# Patient Record
Sex: FEMALE | Race: WHITE | NOT HISPANIC OR LATINO | Employment: FULL TIME | RURAL
[De-identification: names, ages, dates, MRNs, and addresses within clinical notes are randomized per-mention and may not be internally consistent; named-entity substitution may affect disease eponyms.]

---

## 2017-01-27 ENCOUNTER — OFFICE VISIT (OUTPATIENT)
Dept: FAMILY MEDICINE CLINIC | Facility: CLINIC | Age: 37
End: 2017-01-27

## 2017-01-27 VITALS
HEART RATE: 87 BPM | WEIGHT: 130 LBS | DIASTOLIC BLOOD PRESSURE: 78 MMHG | SYSTOLIC BLOOD PRESSURE: 118 MMHG | OXYGEN SATURATION: 96 % | RESPIRATION RATE: 18 BRPM

## 2017-01-27 DIAGNOSIS — G43.109 MIGRAINE WITH AURA AND WITHOUT STATUS MIGRAINOSUS, NOT INTRACTABLE: Primary | ICD-10-CM

## 2017-01-27 PROCEDURE — 99213 OFFICE O/P EST LOW 20 MIN: CPT | Performed by: FAMILY MEDICINE

## 2017-01-27 RX ORDER — TOPIRAMATE 50 MG/1
50 TABLET, FILM COATED ORAL DAILY
Qty: 90 TABLET | Refills: 1 | Status: SHIPPED | OUTPATIENT
Start: 2017-01-27 | End: 2017-10-19 | Stop reason: SDUPTHER

## 2017-01-27 NOTE — MR AVS SNAPSHOT
Gloria Vásquez   1/27/2017 3:30 PM   Office Visit    Dept Phone:  [phone]   Encounter #:  [id number]    Provider:  Ricky Pittman MD   Department:  [facility]                Your Full Care Plan              Today's Medication Changes          These changes are accurate as of: 1/27/17  3:38 PM.  If you have any questions, ask your nurse or doctor.               Medication(s)that have changed:     topiramate 50 MG tablet   Commonly known as:  TOPAMAX   Take 1 tablet by mouth Daily.   What changed:    - medication strength  - how much to take  - when to take this  - additional instructions   Changed by:  Ricky Pittman MD            Where to Get Your Medications      These medications were sent to [facility] / [place], [place] - [address] [facility] - [phone]  - [phone]   [address] [facility] [address]     Phone:  [phone]     topiramate 50 MG tablet                  Your Updated Medication List          This list is accurate as of: 1/27/17  3:38 PM.  Always use your most recent med list.                melatonin 1 MG tablet       topiramate 50 MG tablet   Commonly known as:  TOPAMAX   Take 1 tablet by mouth Daily.               You Were Diagnosed With        Codes Comments    Migraine with aura and without status migrainosus, not intractable    -  Primary ICD-10-CM: G43.109  ICD-9-CM: 346.00       Instructions     None    Patient Instructions History      Upcoming Appointments     Visit Type Date Time Department    FOLLOW UP 1/27/2017  3:30 PM [facility]    OFFICE VISIT 4/12/2017  3:45 PM [facility]      [employer] Signup     [facility] allows you to send messages to your doctor, view your test results, renew your prescriptions, schedule appointments, and more. To sign up, go to [employer] and click on the Sign Up Now link in the New User? box. Enter your [facility]  Activation Code exactly as it appears below along with the last four digits of your Social Security Number and your Date of Birth () to complete the sign-up process. If you do not sign up before the expiration date, you must request a new code.    [employer] Activation Code: [email or web address]  Expires: 2/10/2017  3:38 PM    If you have questions, you can email [email or web address] or call [phone] to talk to our [employer] staff. Remember, [employer] is NOT to be used for urgent needs. For medical emergencies, dial 911.               Other Info from Your Visit           Your Appointments     2017  3:45 PM CDT   Office Visit with Ricky Pittman MD   [facility] (--)    [address] [address] [phone]           Arrive 15 minutes prior to appointment.              Allergies     Sulfa Antibiotics        Reason for Visit     Follow-up           Vital Signs     Blood Pressure Pulse Respirations Weight Oxygen Saturation Smoking Status    118/78 87 18 130 lb (59 kg) 96% Current Every Day Smoker      Problems and Diagnoses Noted     Migraines

## 2017-01-27 NOTE — PROGRESS NOTES
Mao Vásquez is a 36 y.o. female.     Chief Complaint   Patient presents with   • Follow-up       History of Present Illness     she is feeling much better wtih the topamax--migraine ha are much better --she is pleased...      Current Outpatient Prescriptions:   •  melatonin 1 MG tablet, Take 2 mg by mouth At Night As Needed for sleep., Disp: , Rfl:   •  topiramate (TOPAMAX) 50 MG tablet, Take 1 tablet by mouth Daily., Disp: 90 tablet, Rfl: 1  Allergies   Allergen Reactions   • Sulfa Antibiotics        Past Medical History   Diagnosis Date   • Headache      Past Surgical History   Procedure Laterality Date   • Carpal tunnel release Bilateral    •  section     • Colonoscopy         Review of Systems   Constitutional: Negative.    Eyes: Negative.    Respiratory: Negative.    Cardiovascular: Negative.    Gastrointestinal: Negative.    Endocrine: Negative.    Genitourinary: Negative.    Musculoskeletal: Negative.    Skin: Negative.    Allergic/Immunologic: Negative.    Neurological: Positive for headaches.   Hematological: Negative.    Psychiatric/Behavioral: Negative.        Objective    Visit Vitals   • /78   • Pulse 87   • Resp 18   • Wt 130 lb (59 kg)   • SpO2 96%     Physical Exam   Constitutional: She is oriented to person, place, and time. She appears well-developed and well-nourished.   HENT:   Head: Normocephalic and atraumatic.   Right Ear: External ear normal.   Left Ear: External ear normal.   Nose: Nose normal.   Mouth/Throat: Oropharynx is clear and moist.   Eyes: Conjunctivae and EOM are normal. Pupils are equal, round, and reactive to light.   Neck: Normal range of motion. Neck supple.   Cardiovascular: Normal rate, regular rhythm, normal heart sounds and intact distal pulses.    Pulmonary/Chest: Effort normal and breath sounds normal.   Abdominal: Soft. Bowel sounds are normal.   Musculoskeletal: Normal range of motion.   Neurological: She is alert and oriented to person,  place, and time. She has normal reflexes.   Skin: Skin is warm and dry.   Psychiatric: She has a normal mood and affect. Her behavior is normal. Judgment and thought content normal.   Nursing note and vitals reviewed.      Assessment/Plan   Gloria was seen today for follow-up.    Diagnoses and all orders for this visit:    Migraine with aura and without status migrainosus, not intractable    Other orders  -     topiramate (TOPAMAX) 50 MG tablet; Take 1 tablet by mouth Daily.                 No orders of the defined types were placed in this encounter.      Follow up: 6 month(s)

## 2017-02-20 ENCOUNTER — TELEPHONE (OUTPATIENT)
Dept: FAMILY MEDICINE CLINIC | Facility: CLINIC | Age: 37
End: 2017-02-20

## 2017-02-20 NOTE — TELEPHONE ENCOUNTER
Says she is having anxiety or panic attacks. Was wanting to get something for her or an appt to disuss.

## 2017-02-24 ENCOUNTER — OFFICE VISIT (OUTPATIENT)
Dept: FAMILY MEDICINE CLINIC | Facility: CLINIC | Age: 37
End: 2017-02-24

## 2017-02-24 VITALS
DIASTOLIC BLOOD PRESSURE: 80 MMHG | RESPIRATION RATE: 18 BRPM | SYSTOLIC BLOOD PRESSURE: 134 MMHG | OXYGEN SATURATION: 97 % | WEIGHT: 129 LBS | HEART RATE: 103 BPM

## 2017-02-24 DIAGNOSIS — F41.9 ANXIETY: Primary | ICD-10-CM

## 2017-02-24 PROCEDURE — 99213 OFFICE O/P EST LOW 20 MIN: CPT | Performed by: FAMILY MEDICINE

## 2017-02-24 RX ORDER — LORAZEPAM 1 MG/1
1 TABLET ORAL DAILY PRN
Qty: 20 TABLET | Refills: 0 | Status: SHIPPED | OUTPATIENT
Start: 2017-02-24 | End: 2018-01-26 | Stop reason: SDUPTHER

## 2017-02-24 NOTE — PROGRESS NOTES
Mao Vásquez is a 36 y.o. female.     Chief Complaint   Patient presents with   • Anxiety        History of Present Illness     she nots feeling anxious...several things going on in her life..here today wth her mother..denies being suicidal      Current Outpatient Prescriptions:   •  topiramate (TOPAMAX) 50 MG tablet, Take 1 tablet by mouth Daily., Disp: 90 tablet, Rfl: 1  •  LORazepam (ATIVAN) 1 MG tablet, Take 1 tablet by mouth Daily As Needed for anxiety., Disp: 20 tablet, Rfl: 0  •  sertraline (ZOLOFT) 50 MG tablet, Take 1 tablet by mouth Daily., Disp: 30 tablet, Rfl: 1  Allergies   Allergen Reactions   • Sulfa Antibiotics        Past Medical History   Diagnosis Date   • Headache      Past Surgical History   Procedure Laterality Date   • Carpal tunnel release Bilateral    •  section     • Colonoscopy         Review of Systems   Constitutional: Negative.    HENT: Negative.    Eyes: Negative.    Cardiovascular: Negative.    Gastrointestinal: Negative.    Endocrine: Negative.    Genitourinary: Negative.    Musculoskeletal: Negative.    Skin: Negative.    Allergic/Immunologic: Negative.    Neurological: Negative.    Hematological: Negative.    Psychiatric/Behavioral: Positive for sleep disturbance. The patient is nervous/anxious.        Objective    Visit Vitals   • /80   • Pulse 103   • Resp 18   • Wt 129 lb (58.5 kg)   • SpO2 97%     Physical Exam   Constitutional: She is oriented to person, place, and time. She appears well-developed and well-nourished.   HENT:   Head: Normocephalic and atraumatic.   Right Ear: External ear normal.   Left Ear: External ear normal.   Nose: Nose normal.   Mouth/Throat: Oropharynx is clear and moist.   Eyes: Conjunctivae and EOM are normal. Pupils are equal, round, and reactive to light.   Neck: Normal range of motion. Neck supple.   Cardiovascular: Normal rate, regular rhythm, normal heart sounds and intact distal pulses.    Pulmonary/Chest: Effort  normal and breath sounds normal.   Abdominal: Bowel sounds are normal.   Musculoskeletal: Normal range of motion.   Neurological: She is alert and oriented to person, place, and time. She has normal reflexes.   Skin: Skin is warm and dry.   Psychiatric: She has a normal mood and affect. Her behavior is normal. Judgment and thought content normal.   Nursing note and vitals reviewed.      Assessment/Plan   Gloria was seen today for anxiety.    Diagnoses and all orders for this visit:    Anxiety    Other orders  -     sertraline (ZOLOFT) 50 MG tablet; Take 1 tablet by mouth Daily.  -     LORazepam (ATIVAN) 1 MG tablet; Take 1 tablet by mouth Daily As Needed for anxiety.                 No orders of the defined types were placed in this encounter.      Follow up: 4 week(s)

## 2017-03-22 ENCOUNTER — OFFICE VISIT (OUTPATIENT)
Dept: FAMILY MEDICINE CLINIC | Facility: CLINIC | Age: 37
End: 2017-03-22

## 2017-03-22 VITALS
OXYGEN SATURATION: 97 % | DIASTOLIC BLOOD PRESSURE: 78 MMHG | RESPIRATION RATE: 16 BRPM | BODY MASS INDEX: 23 KG/M2 | WEIGHT: 125 LBS | HEART RATE: 108 BPM | SYSTOLIC BLOOD PRESSURE: 126 MMHG | HEIGHT: 62 IN

## 2017-03-22 DIAGNOSIS — F41.9 ANXIETY: Primary | ICD-10-CM

## 2017-03-22 DIAGNOSIS — G43.109 MIGRAINE WITH AURA AND WITHOUT STATUS MIGRAINOSUS, NOT INTRACTABLE: ICD-10-CM

## 2017-03-22 PROCEDURE — 99212 OFFICE O/P EST SF 10 MIN: CPT | Performed by: FAMILY MEDICINE

## 2017-03-22 NOTE — PROGRESS NOTES
"Mao Vásquez is a 36 y.o. female.     Chief Complaint   Patient presents with   • Follow-up        History of Present Illness     she notes her migraine ha is stable--her anxiety is stable --she took ativan once --it contdrolled her symptoms..denies any issues with side effects from the zoloft      Current Outpatient Prescriptions:   •  LORazepam (ATIVAN) 1 MG tablet, Take 1 tablet by mouth Daily As Needed for anxiety., Disp: 20 tablet, Rfl: 0  •  sertraline (ZOLOFT) 50 MG tablet, Take 1 tablet by mouth Daily., Disp: 30 tablet, Rfl: 1  •  topiramate (TOPAMAX) 50 MG tablet, Take 1 tablet by mouth Daily., Disp: 90 tablet, Rfl: 1  Allergies   Allergen Reactions   • Sulfa Antibiotics        Past Medical History:   Diagnosis Date   • Headache      Past Surgical History:   Procedure Laterality Date   • CARPAL TUNNEL RELEASE Bilateral    •  SECTION     • COLONOSCOPY         Review of Systems   Constitutional: Negative.    HENT: Negative.    Eyes: Negative.    Respiratory: Negative.    Cardiovascular: Negative.    Gastrointestinal: Negative.    Endocrine: Negative.    Genitourinary: Negative.    Musculoskeletal: Negative.    Skin: Negative.    Allergic/Immunologic: Negative.    Neurological: Negative.    Hematological: Negative.    Psychiatric/Behavioral: The patient is nervous/anxious.        Objective  /78  Pulse 108  Resp 16  Ht 62\" (157.5 cm)  Wt 125 lb (56.7 kg)  SpO2 97%  BMI 22.86 kg/m2  Physical Exam   Constitutional: She is oriented to person, place, and time. She appears well-developed and well-nourished.   HENT:   Head: Normocephalic and atraumatic.   Right Ear: External ear normal.   Left Ear: External ear normal.   Nose: Nose normal.   Mouth/Throat: Oropharynx is clear and moist.   Eyes: Conjunctivae and EOM are normal. Pupils are equal, round, and reactive to light.   Neck: Normal range of motion. Neck supple.   Cardiovascular: Normal rate, regular rhythm, normal heart sounds " and intact distal pulses.    Pulmonary/Chest: Effort normal and breath sounds normal.   Abdominal: Soft. Bowel sounds are normal.   Musculoskeletal: Normal range of motion.   Neurological: She is alert and oriented to person, place, and time. She has normal reflexes.   Skin: Skin is warm and dry.   Psychiatric: She has a normal mood and affect. Her behavior is normal. Judgment and thought content normal.   Nursing note and vitals reviewed.      Assessment/Plan   Gloria was seen today for follow-up.    Diagnoses and all orders for this visit:    Anxiety    Migraine with aura and without status migrainosus, not intractable                 No orders of the defined types were placed in this encounter.      Follow up: 6 month(s)

## 2017-07-26 ENCOUNTER — OFFICE VISIT (OUTPATIENT)
Dept: FAMILY MEDICINE CLINIC | Facility: CLINIC | Age: 37
End: 2017-07-26

## 2017-07-26 VITALS
DIASTOLIC BLOOD PRESSURE: 70 MMHG | HEIGHT: 62 IN | WEIGHT: 130 LBS | HEART RATE: 94 BPM | BODY MASS INDEX: 23.92 KG/M2 | SYSTOLIC BLOOD PRESSURE: 110 MMHG | OXYGEN SATURATION: 98 % | RESPIRATION RATE: 16 BRPM

## 2017-07-26 DIAGNOSIS — Z72.0 TOBACCO ABUSE: ICD-10-CM

## 2017-07-26 DIAGNOSIS — R05.9 COUGH: ICD-10-CM

## 2017-07-26 DIAGNOSIS — F41.9 ANXIETY: Primary | ICD-10-CM

## 2017-07-26 DIAGNOSIS — G43.109 MIGRAINE WITH AURA AND WITHOUT STATUS MIGRAINOSUS, NOT INTRACTABLE: ICD-10-CM

## 2017-07-26 PROCEDURE — 99214 OFFICE O/P EST MOD 30 MIN: CPT | Performed by: FAMILY MEDICINE

## 2017-07-26 NOTE — PROGRESS NOTES
"Mao Vásquez is a 36 y.o. female.     Chief Complaint   Patient presents with   • Follow-up     6 mo        History of Present Illness     she notes having cough for about 4 weeks--its prod but she has not seen the mucous--denies any fever or hemoptysis  Migraine hx is stable --anxiety is about the same with meds--she is still smoking    Current Outpatient Prescriptions:   •  LORazepam (ATIVAN) 1 MG tablet, Take 1 tablet by mouth Daily As Needed for anxiety., Disp: 20 tablet, Rfl: 0  •  sertraline (ZOLOFT) 50 MG tablet, Take 1 tablet by mouth Daily., Disp: 30 tablet, Rfl: 3  •  topiramate (TOPAMAX) 50 MG tablet, Take 1 tablet by mouth Daily., Disp: 90 tablet, Rfl: 1  Allergies   Allergen Reactions   • Sulfa Antibiotics        Past Medical History:   Diagnosis Date   • Headache      Past Surgical History:   Procedure Laterality Date   • CARPAL TUNNEL RELEASE Bilateral    •  SECTION     • COLONOSCOPY         Review of Systems   Constitutional: Negative.    HENT: Negative.    Eyes: Negative.    Respiratory: Positive for cough.    Cardiovascular: Negative.    Gastrointestinal: Negative.    Endocrine: Negative.    Genitourinary: Negative.    Musculoskeletal: Negative.    Skin: Negative.    Allergic/Immunologic: Negative.    Neurological: Negative.    Hematological: Negative.    Psychiatric/Behavioral: Negative.        Objective  /70  Pulse 94  Resp 16  Ht 62\" (157.5 cm)  Wt 130 lb (59 kg)  SpO2 98%  BMI 23.78 kg/m2  Physical Exam   Constitutional: She is oriented to person, place, and time. She appears well-developed and well-nourished.   HENT:   Head: Normocephalic and atraumatic.   Right Ear: External ear normal.   Left Ear: External ear normal.   Nose: Nose normal.   Mouth/Throat: Oropharynx is clear and moist.   Eyes: Conjunctivae and EOM are normal. Pupils are equal, round, and reactive to light.   Neck: Normal range of motion. Neck supple.   Cardiovascular: Normal rate, regular " rhythm, normal heart sounds and intact distal pulses.    Pulmonary/Chest: Effort normal and breath sounds normal.   Abdominal: Soft. Bowel sounds are normal.   Musculoskeletal: Normal range of motion.   Neurological: She is alert and oriented to person, place, and time. She has normal reflexes.   Skin: Skin is warm and dry.   Psychiatric: She has a normal mood and affect. Her behavior is normal. Judgment and thought content normal.   Nursing note and vitals reviewed.      Assessment/Plan   Gloria was seen today for follow-up.    Diagnoses and all orders for this visit:    Anxiety    Migraine with aura and without status migrainosus, not intractable    Cough  -     XR Chest 2 View    Tobacco abuse    we spent 5min discussing smoking cessation             Orders Placed This Encounter   Procedures   • XR Chest 2 View     Order Specific Question:   Reason for Exam:     Answer:   cough       Follow up: 6 month(s)

## 2017-08-01 ENCOUNTER — TELEPHONE (OUTPATIENT)
Dept: FAMILY MEDICINE CLINIC | Facility: CLINIC | Age: 37
End: 2017-08-01

## 2017-10-19 RX ORDER — TOPIRAMATE 50 MG/1
50 TABLET, FILM COATED ORAL DAILY
Qty: 90 TABLET | Refills: 1 | Status: SHIPPED | OUTPATIENT
Start: 2017-10-19 | End: 2020-01-13

## 2018-01-26 ENCOUNTER — OFFICE VISIT (OUTPATIENT)
Dept: FAMILY MEDICINE CLINIC | Facility: CLINIC | Age: 38
End: 2018-01-26

## 2018-01-26 VITALS
BODY MASS INDEX: 28.52 KG/M2 | OXYGEN SATURATION: 98 % | HEIGHT: 62 IN | DIASTOLIC BLOOD PRESSURE: 88 MMHG | HEART RATE: 78 BPM | RESPIRATION RATE: 16 BRPM | WEIGHT: 155 LBS | SYSTOLIC BLOOD PRESSURE: 122 MMHG

## 2018-01-26 DIAGNOSIS — F41.9 ANXIETY: ICD-10-CM

## 2018-01-26 DIAGNOSIS — G43.109 MIGRAINE WITH AURA AND WITHOUT STATUS MIGRAINOSUS, NOT INTRACTABLE: Primary | ICD-10-CM

## 2018-01-26 DIAGNOSIS — Z72.0 TOBACCO ABUSE: ICD-10-CM

## 2018-01-26 PROBLEM — R05.9 COUGH: Status: RESOLVED | Noted: 2017-07-26 | Resolved: 2018-01-26

## 2018-01-26 PROCEDURE — 99213 OFFICE O/P EST LOW 20 MIN: CPT | Performed by: FAMILY MEDICINE

## 2018-01-26 RX ORDER — MEDROXYPROGESTERONE ACETATE 150 MG/ML
INJECTION, SUSPENSION INTRAMUSCULAR
COMMUNITY
Start: 2018-01-15 | End: 2019-12-31

## 2018-01-26 RX ORDER — LORAZEPAM 1 MG/1
1 TABLET ORAL DAILY PRN
Qty: 30 TABLET | Refills: 0 | OUTPATIENT
Start: 2018-01-26 | End: 2019-01-22 | Stop reason: SDUPTHER

## 2018-01-26 NOTE — PROGRESS NOTES
"Mao Vásquez is a 37 y.o. female.     Chief Complaint   Patient presents with   • Follow-up     6 mo       History of Present Illness     here today--she is still smoking but ready to quit--her migraine headache hx is stable...her anxiety is about the same--worrying about the health of her daughter      Current Outpatient Prescriptions:   •  LORazepam (ATIVAN) 1 MG tablet, Take 1 tablet by mouth Daily As Needed for Anxiety., Disp: 30 tablet, Rfl: 0  •  MedroxyPROGESTERone Acetate 150 MG/ML suspension prefilled syringe, , Disp: , Rfl:   •  topiramate (TOPAMAX) 50 MG tablet, Take 1 tablet by mouth Daily., Disp: 90 tablet, Rfl: 1  Allergies   Allergen Reactions   • Sulfa Antibiotics        Past Medical History:   Diagnosis Date   • Headache      Past Surgical History:   Procedure Laterality Date   • CARPAL TUNNEL RELEASE Bilateral    •  SECTION     • COLONOSCOPY         Review of Systems   Constitutional: Negative.    Eyes: Negative.    Respiratory: Negative.    Cardiovascular: Negative.    Gastrointestinal: Negative.    Endocrine: Negative.    Genitourinary: Negative.    Musculoskeletal: Negative.    Skin: Negative.    Allergic/Immunologic: Negative.    Neurological: Positive for headaches.   Hematological: Negative.    Psychiatric/Behavioral: Negative.        Objective  /88  Pulse 78  Resp 16  Ht 157.5 cm (62\")  Wt 70.3 kg (155 lb)  SpO2 98%  BMI 28.35 kg/m2  Physical Exam   Constitutional: She is oriented to person, place, and time. She appears well-developed and well-nourished.   HENT:   Head: Normocephalic and atraumatic.   Right Ear: External ear normal.   Left Ear: External ear normal.   Nose: Nose normal.   Mouth/Throat: Oropharynx is clear and moist.   Eyes: Conjunctivae and EOM are normal. Pupils are equal, round, and reactive to light.   Neck: Normal range of motion. Neck supple.   Cardiovascular: Normal rate, regular rhythm, normal heart sounds and intact distal pulses.  "   Pulmonary/Chest: Effort normal and breath sounds normal.   Abdominal: Soft. Bowel sounds are normal.   Musculoskeletal: Normal range of motion.   Neurological: She is alert and oriented to person, place, and time. She has normal reflexes.   Skin: Skin is warm and dry.   Psychiatric: She has a normal mood and affect. Her behavior is normal. Judgment and thought content normal.   Nursing note and vitals reviewed.      Assessment/Plan   Gloria was seen today for follow-up.    Diagnoses and all orders for this visit:    Migraine with aura and without status migrainosus, not intractable  -     CBC w AUTO Differential  -     Comprehensive Metabolic Panel  -     Lipid panel  -     TSH    Tobacco abuse    Anxiety  -     Hemoglobin A1c  -     Urinalysis - Urine, Clean Catch    Patient's BMI is above normal parameters. Follow-up plan includes:  exercise counseling and nutrition counseling.  I advised the patient of the risks in continuing to use tobacco, and I provided this patient with smoking cessation educational materials.  I also discussed how to quit smoking and the patient has expressed the willingness to quit.      During this visit, I spent 3-10 mintues counseling the patient regarding smoking cessation.              Orders Placed This Encounter   Procedures   • Comprehensive Metabolic Panel   • Lipid panel   • TSH   • Hemoglobin A1c   • Urinalysis - Urine, Clean Catch   • CBC w AUTO Differential     Order Specific Question:   Manual Differential     Answer:   No       Follow up: 6 month(s)

## 2018-01-26 NOTE — PATIENT INSTRUCTIONS
Tobacco Use Disorder  Tobacco use disorder (TUD) is a mental disorder. It is the long-term use of tobacco in spite of related health problems or difficulty with normal life activities. Tobacco is most commonly smoked as cigarettes and less commonly as cigars or pipes. Smokeless chewing tobacco and snuff are also popular. People with TUD get a feeling of extreme pleasure (euphoria) from using tobacco and have a desire to use it again and again. Repeated use of tobacco can cause problems.  The addictive effects of tobacco are due mainly to the ingredient nicotine. Nicotine also causes a rush of adrenaline (epinephrine) in the body. This leads to increased blood pressure, heart rate, and breathing rate. These changes may cause problems for people with high blood pressure, weak hearts, or lung disease. High doses of nicotine in children and pets can lead to seizures and death.  Tobacco contains a number of other unsafe chemicals. These chemicals are especially harmful when inhaled as smoke and can damage almost every organ in the body. Smokers live shorter lives than nonsmokers and are at risk of dying from a number of diseases and cancers. Tobacco smoke can also cause health problems for nonsmokers (due to inhaling secondhand smoke). Smoking is also a fire hazard.  TUD usually starts in the late teenage years and is most common in young adults between the ages of 18 and 25 years. People who start smoking earlier in life are more likely to continue smoking as adults. TUD is somewhat more common in men than women. People with TUD are at higher risk for using alcohol and other drugs of abuse.  What increases the risk?  Risk factors for TUD include:  · Having family members with the disorder.  · Being around people who use tobacco.  · Having an existing mental health issue such as schizophrenia, depression, bipolar disorder, ADHD, or posttraumatic stress disorder (PTSD).  What are the signs or symptoms?  People with  tobacco use disorder have two or more of the following signs and symptoms within 12 months:  · Use of more tobacco over a longer period than intended.  · Not able to cut down or control tobacco use.  · A lot of time spent obtaining or using tobacco.  · Strong desire or urge to use tobacco (craving). Cravings may last for 6 months or longer after quitting.  · Use of tobacco even when use leads to major problems at work, school, or home.  · Use of tobacco even when use leads to relationship problems.  · Giving up or cutting down on important life activities because of tobacco use.  · Repeatedly using tobacco in situations where it puts you or others in physical danger, like smoking in bed.  · Use of tobacco even when it is known that a physical or mental problem is likely related to tobacco use.  ¨ Physical problems are numerous and may include chronic bronchitis, emphysema, lung and other cancers, gum disease, high blood pressure, heart disease, and stroke.  ¨ Mental problems caused by tobacco may include difficulty sleeping and anxiety.  · Need to use greater amounts of tobacco to get the same effect. This means you have developed a tolerance.  · Withdrawal symptoms as a result of stopping or rapidly cutting back use. These symptoms may last a month or more after quitting and include the following:  ¨ Depressed, anxious, or irritable mood.  ¨ Difficulty concentrating.  ¨ Increased appetite.  ¨ Restlessness or trouble sleeping.  ¨ Use of tobacco to avoid withdrawal symptoms.  How is this diagnosed?  Tobacco use disorder is diagnosed by your health care provider. A diagnosis may be made by:  · Your health care provider asking questions about your tobacco use and any problems it may be causing.  · A physical exam.  · Lab tests.  · You may be referred to a mental health professional or addiction specialist.  The severity of tobacco use disorder depends on the number of signs and symptoms you have:  · Mild--Two or three  symptoms.  · Moderate--Four or five symptoms.  · Severe--Six or more symptoms.  How is this treated?  Many people with tobacco use disorder are unable to quit on their own and need help. Treatment options include the following:  · Nicotine replacement therapy (NRT). NRT provides nicotine without the other harmful chemicals in tobacco. NRT gradually lowers the dosage of nicotine in the body and reduces withdrawal symptoms. NRT is available in over-the-counter forms (gum, lozenges, and skin patches) as well as prescription forms (mouth inhaler and nasal spray).  · Medicines. This may include:  ¨ Antidepressant medicine that may reduce nicotine cravings.  ¨ A medicine that acts on nicotine receptors in the brain to reduce cravings and withdrawal symptoms. It may also block the effects of tobacco in people with TUD who relapse.  · Counseling or talk therapy. A form of talk therapy called behavioral therapy is commonly used to treat people with TUD. Behavioral therapy looks at triggers for tobacco use, how to avoid them, and how to cope with cravings. It is most effective in person or by phone but is also available in self-help forms (books and Internet websites).  · Support groups. These provide emotional support, advice, and guidance for quitting tobacco.  The most effective treatment for TUD is usually a combination of medicine, talk therapy, and support groups.  Follow these instructions at home:  · Keep all follow-up visits as directed by your health care provider. This is important.  · Take medicines only as directed by your health care provider.  · Check with your health care provider before starting new prescription or over-the-counter medicines.  Contact a health care provider if:  · You are not able to take your medicines as prescribed.  · Treatment is not helping your TUD and your symptoms get worse.  Get help right away if:  · You have serious thoughts about hurting yourself or others.  · You have trouble  breathing, chest pain, sudden weakness, or sudden numbness in part of your body.  This information is not intended to replace advice given to you by your health care provider. Make sure you discuss any questions you have with your health care provider.  Document Released: 08/23/2005 Document Revised: 08/20/2017 Document Reviewed: 02/13/2015  [employer] Interactive Patient Education © 2017 [employer]

## 2019-01-22 RX ORDER — LORAZEPAM 1 MG/1
1 TABLET ORAL DAILY PRN
Qty: 30 TABLET | Refills: 0 | Status: SHIPPED | OUTPATIENT
Start: 2019-01-22 | End: 2019-07-31 | Stop reason: SDUPTHER

## 2019-01-30 ENCOUNTER — TELEPHONE (OUTPATIENT)
Dept: FAMILY MEDICINE CLINIC | Facility: CLINIC | Age: 39
End: 2019-01-30

## 2019-07-31 RX ORDER — LORAZEPAM 1 MG/1
1 TABLET ORAL DAILY PRN
Qty: 10 TABLET | Refills: 0 | Status: SHIPPED | OUTPATIENT
Start: 2019-07-31 | End: 2019-12-18 | Stop reason: SDUPTHER

## 2019-07-31 NOTE — TELEPHONE ENCOUNTER
Pt has not been seen in over a yr but this med was rf on 1/19 so I gave 10 tabs with note for an appt

## 2019-10-25 ENCOUNTER — TELEPHONE (OUTPATIENT)
Dept: FAMILY MEDICINE CLINIC | Facility: CLINIC | Age: 39
End: 2019-10-25

## 2019-10-25 RX ORDER — AZITHROMYCIN 250 MG/1
TABLET, FILM COATED ORAL
Qty: 6 TABLET | Refills: 0 | Status: SHIPPED | OUTPATIENT
Start: 2019-10-25 | End: 2019-12-31

## 2019-10-25 NOTE — TELEPHONE ENCOUNTER
Last OV 1-26-18, 2 no show appts since.    Pt calls requesting an abx be sent in to [facility] for congestion and cough x 1 week.

## 2019-12-18 DIAGNOSIS — F41.9 ANXIETY: Primary | ICD-10-CM

## 2019-12-18 RX ORDER — LORAZEPAM 1 MG/1
1 TABLET ORAL DAILY PRN
Qty: 10 TABLET | Refills: 0 | Status: SHIPPED | OUTPATIENT
Start: 2019-12-18 | End: 2020-01-02 | Stop reason: SDUPTHER

## 2019-12-31 ENCOUNTER — OFFICE VISIT (OUTPATIENT)
Dept: FAMILY MEDICINE CLINIC | Facility: CLINIC | Age: 39
End: 2019-12-31

## 2019-12-31 VITALS
SYSTOLIC BLOOD PRESSURE: 136 MMHG | OXYGEN SATURATION: 97 % | HEART RATE: 95 BPM | BODY MASS INDEX: 28.71 KG/M2 | RESPIRATION RATE: 16 BRPM | HEIGHT: 62 IN | DIASTOLIC BLOOD PRESSURE: 82 MMHG | WEIGHT: 156 LBS | TEMPERATURE: 98 F

## 2019-12-31 DIAGNOSIS — R23.2 HOT FLASHES: Primary | ICD-10-CM

## 2019-12-31 PROCEDURE — 99213 OFFICE O/P EST LOW 20 MIN: CPT | Performed by: FAMILY MEDICINE

## 2019-12-31 NOTE — PROGRESS NOTES
"Mao Vásquez is a 39 y.o. female.     Chief Complaint   Patient presents with   • Med Refill     and would also like to get her hormones checked.   • Night Sweats     also day time hot flashes.        History of Present Illness     she is noting hot flashes and wonders if she having menopause      Current Outpatient Medications:   •  LORazepam (ATIVAN) 1 MG tablet, Take 1 tablet by mouth Daily As Needed for Anxiety., Disp: 10 tablet, Rfl: 0  •  topiramate (TOPAMAX) 50 MG tablet, Take 1 tablet by mouth Daily., Disp: 90 tablet, Rfl: 1  Allergies   Allergen Reactions   • Sulfa Antibiotics        Past Medical History:   Diagnosis Date   • Headache      Past Surgical History:   Procedure Laterality Date   • CARPAL TUNNEL RELEASE Bilateral    •  SECTION     • COLONOSCOPY         Review of Systems   Constitutional: Positive for fatigue.   HENT: Negative.    Eyes: Negative.    Respiratory: Negative.    Cardiovascular: Negative.    Gastrointestinal: Negative.    Endocrine: Negative.    Genitourinary: Negative.    Musculoskeletal: Negative.    Skin: Negative.    Allergic/Immunologic: Negative.    Neurological: Negative.    Hematological: Negative.    Psychiatric/Behavioral: Negative.        Objective  /82 (BP Location: Left arm, Patient Position: Sitting)   Pulse 95   Temp 98 °F (36.7 °C)   Resp 16   Ht 157.5 cm (62\")   Wt 70.8 kg (156 lb)   SpO2 97%   BMI 28.53 kg/m²   Physical Exam   Constitutional: She is oriented to person, place, and time. She appears well-developed and well-nourished.   HENT:   Head: Normocephalic and atraumatic.   Right Ear: External ear normal.   Left Ear: External ear normal.   Nose: Nose normal.   Mouth/Throat: Oropharynx is clear and moist.   Eyes: Pupils are equal, round, and reactive to light. Conjunctivae and EOM are normal.   Neck: Normal range of motion. Neck supple.   Cardiovascular: Normal rate, regular rhythm, normal heart sounds and intact distal pulses. "   Pulmonary/Chest: Effort normal and breath sounds normal.   Abdominal: Soft. Bowel sounds are normal.   Musculoskeletal: Normal range of motion.   Neurological: She is alert and oriented to person, place, and time.   Skin: Skin is warm. Capillary refill takes less than 2 seconds.   Psychiatric: She has a normal mood and affect. Her behavior is normal. Judgment and thought content normal.   Nursing note and vitals reviewed.      Assessment/Plan   Gloria was seen today for med refill and night sweats.    Diagnoses and all orders for this visit:    Hot flashes  -     FSH & LH  -     Estrogens, Total                 Orders Placed This Encounter   Procedures   • FSH & LH   • Estrogens, Total       Follow up: 6 month(s)

## 2020-01-02 DIAGNOSIS — F41.9 ANXIETY: ICD-10-CM

## 2020-01-02 RX ORDER — LORAZEPAM 1 MG/1
1 TABLET ORAL DAILY PRN
Qty: 30 TABLET | Refills: 0 | Status: SHIPPED | OUTPATIENT
Start: 2020-01-02

## 2020-01-03 LAB
ESTROGEN SERPL-MCNC: 170 PG/ML
FSH SERPL-ACNC: 5.4 MIU/ML
LH SERPL-ACNC: 7 MIU/ML

## 2020-01-08 ENCOUNTER — RESULTS ENCOUNTER (OUTPATIENT)
Dept: FAMILY MEDICINE CLINIC | Facility: CLINIC | Age: 40
End: 2020-01-08

## 2020-01-08 DIAGNOSIS — R53.83 FATIGUE, UNSPECIFIED TYPE: Primary | ICD-10-CM

## 2020-01-08 DIAGNOSIS — R23.2 HOT FLASHES: ICD-10-CM

## 2020-01-08 DIAGNOSIS — R53.83 FATIGUE, UNSPECIFIED TYPE: ICD-10-CM

## 2020-01-13 ENCOUNTER — TELEPHONE (OUTPATIENT)
Dept: FAMILY MEDICINE CLINIC | Facility: CLINIC | Age: 40
End: 2020-01-13

## 2020-01-13 ENCOUNTER — OFFICE VISIT (OUTPATIENT)
Dept: FAMILY MEDICINE CLINIC | Facility: CLINIC | Age: 40
End: 2020-01-13

## 2020-01-13 VITALS
OXYGEN SATURATION: 98 % | DIASTOLIC BLOOD PRESSURE: 86 MMHG | WEIGHT: 156 LBS | RESPIRATION RATE: 16 BRPM | HEIGHT: 62 IN | BODY MASS INDEX: 28.71 KG/M2 | SYSTOLIC BLOOD PRESSURE: 136 MMHG | HEART RATE: 110 BPM

## 2020-01-13 DIAGNOSIS — R03.0 BLOOD PRESSURE ELEVATED WITHOUT HISTORY OF HTN: ICD-10-CM

## 2020-01-13 DIAGNOSIS — R23.2 HOT FLASHES: Primary | ICD-10-CM

## 2020-01-13 PROCEDURE — 99213 OFFICE O/P EST LOW 20 MIN: CPT | Performed by: FAMILY MEDICINE

## 2020-01-13 RX ORDER — HYDROCHLOROTHIAZIDE 12.5 MG/1
12.5 CAPSULE, GELATIN COATED ORAL DAILY
Qty: 30 CAPSULE | Refills: 1 | Status: SHIPPED | OUTPATIENT
Start: 2020-01-13

## 2020-01-13 NOTE — PROGRESS NOTES
"Mao Vásquez is a 39 y.o. female.     Chief Complaint   Patient presents with   • Follow-up     lab work & hot flashes       History of Present Illness     noted elevation of bp with flashes of heat      Current Outpatient Medications:   •  hydroCHLOROthiazide (MICROZIDE) 12.5 MG capsule, Take 1 capsule by mouth Daily., Disp: 30 capsule, Rfl: 1  •  LORazepam (ATIVAN) 1 MG tablet, Take 1 tablet by mouth Daily As Needed for Anxiety., Disp: 30 tablet, Rfl: 0  •  metoprolol tartrate (LOPRESSOR) 25 MG tablet, Take 1 tablet by mouth 2 (Two) Times a Day., Disp: 60 tablet, Rfl: 2  Allergies   Allergen Reactions   • Sulfa Antibiotics        Past Medical History:   Diagnosis Date   • Headache      Past Surgical History:   Procedure Laterality Date   • CARPAL TUNNEL RELEASE Bilateral    •  SECTION     • COLONOSCOPY         Review of Systems   HENT: Negative.    Eyes: Negative.    Respiratory: Negative.    Cardiovascular: Negative.    Gastrointestinal: Negative.    Endocrine: Negative.    Genitourinary: Negative.    Musculoskeletal: Negative.    Skin: Positive for color change.   Allergic/Immunologic: Negative.    Neurological: Negative.    Hematological: Negative.    Psychiatric/Behavioral: Negative.        Objective  /86   Pulse 110   Resp 16   Ht 157.5 cm (62\")   Wt 70.8 kg (156 lb)   SpO2 98%   BMI 28.53 kg/m²   Physical Exam   Constitutional: She is oriented to person, place, and time. She appears well-developed and well-nourished.   HENT:   Head: Normocephalic and atraumatic.   Right Ear: External ear normal.   Left Ear: External ear normal.   Nose: Nose normal.   Mouth/Throat: Oropharynx is clear and moist.   Eyes: Pupils are equal, round, and reactive to light. Conjunctivae and EOM are normal.   Neck: Normal range of motion. Neck supple.   Cardiovascular: Normal rate, regular rhythm, normal heart sounds and intact distal pulses.   Pulmonary/Chest: Effort normal and breath sounds " normal.   Abdominal: Soft. Bowel sounds are normal.   Musculoskeletal: Normal range of motion.   Neurological: She is alert and oriented to person, place, and time.   Skin: Skin is warm. Capillary refill takes less than 2 seconds.   Psychiatric: She has a normal mood and affect. Her behavior is normal. Judgment and thought content normal.   Nursing note and vitals reviewed.      Assessment/Plan   Gloria was seen today for follow-up.    Diagnoses and all orders for this visit:    Hot flashes  -     CT Chest Without Contrast; Future    Blood pressure elevated without history of HTN  -     CT Chest Without Contrast; Future    Other orders  -     hydroCHLOROthiazide (MICROZIDE) 12.5 MG capsule; Take 1 capsule by mouth Daily.  -     metoprolol tartrate (LOPRESSOR) 25 MG tablet; Take 1 tablet by mouth 2 (Two) Times a Day.                 Orders Placed This Encounter   Procedures   • CT Chest Without Contrast     Standing Status:   Future     Standing Expiration Date:   1/13/2021     Order Specific Question:   Patient Pregnant     Answer:   No       Follow up: 5 week(s)

## 2020-01-23 DIAGNOSIS — R03.0 BLOOD PRESSURE ELEVATED WITHOUT HISTORY OF HTN: ICD-10-CM

## 2020-01-23 DIAGNOSIS — R23.2 HOT FLASHES: ICD-10-CM

## 2020-03-27 ENCOUNTER — TELEPHONE (OUTPATIENT)
Dept: FAMILY MEDICINE CLINIC | Facility: CLINIC | Age: 40
End: 2020-03-27

## 2020-03-27 NOTE — TELEPHONE ENCOUNTER
12/18/19 & 01/02/20  Pt req refill of ativan. [place] Drug Monitoring report ran and scanned into chart.

## 2022-12-16 ENCOUNTER — HOSPITAL ENCOUNTER (EMERGENCY)
Facility: HOSPITAL | Age: 42
Discharge: HOME | End: 2022-12-17
Admitting: STUDENT IN AN ORGANIZED HEALTH CARE EDUCATION/TRAINING PROGRAM

## 2022-12-16 ENCOUNTER — APPOINTMENT (OUTPATIENT)
Dept: CT IMAGING | Facility: HOSPITAL | Age: 42
End: 2022-12-16

## 2022-12-16 DIAGNOSIS — K57.92 ACUTE DIVERTICULITIS: Primary | ICD-10-CM

## 2022-12-16 LAB
ALBUMIN SERPL-MCNC: 4.2 G/DL (ref 3.5–5.2)
ALBUMIN/GLOB SERPL: 1.5 G/DL
ALP SERPL-CCNC: 81 U/L (ref 39–117)
ALT SERPL W P-5'-P-CCNC: 23 U/L (ref 1–33)
ANION GAP SERPL CALCULATED.3IONS-SCNC: 12 MMOL/L (ref 5–15)
AST SERPL-CCNC: 18 U/L (ref 1–32)
BASOPHILS # BLD AUTO: 0.04 10*3/MM3 (ref 0–0.2)
BASOPHILS NFR BLD AUTO: 0.3 % (ref 0–1.5)
BILIRUB SERPL-MCNC: 0.5 MG/DL (ref 0–1.2)
BUN SERPL-MCNC: 13 MG/DL (ref 6–20)
BUN/CREAT SERPL: 26 (ref 7–25)
CALCIUM SPEC-SCNC: 9.1 MG/DL (ref 8.6–10.5)
CHLORIDE SERPL-SCNC: 103 MMOL/L (ref 98–107)
CO2 SERPL-SCNC: 23 MMOL/L (ref 22–29)
CREAT SERPL-MCNC: 0.5 MG/DL (ref 0.57–1)
D-LACTATE SERPL-SCNC: 0.9 MMOL/L (ref 0.5–2)
DEPRECATED RDW RBC AUTO: 45.1 FL (ref 37–54)
EGFRCR SERPLBLD CKD-EPI 2021: 120.3 ML/MIN/1.73
EOSINOPHIL # BLD AUTO: 0.08 10*3/MM3 (ref 0–0.4)
EOSINOPHIL NFR BLD AUTO: 0.6 % (ref 0.3–6.2)
ERYTHROCYTE [DISTWIDTH] IN BLOOD BY AUTOMATED COUNT: 13.2 % (ref 12.3–15.4)
GLOBULIN UR ELPH-MCNC: 2.8 GM/DL
GLUCOSE SERPL-MCNC: 109 MG/DL (ref 65–99)
HCT VFR BLD AUTO: 40.4 % (ref 34–46.6)
HGB BLD-MCNC: 13.3 G/DL (ref 12–15.9)
IMM GRANULOCYTES # BLD AUTO: 0.06 10*3/MM3 (ref 0–0.05)
IMM GRANULOCYTES NFR BLD AUTO: 0.4 % (ref 0–0.5)
LIPASE SERPL-CCNC: 22 U/L (ref 13–60)
LYMPHOCYTES # BLD AUTO: 1.66 10*3/MM3 (ref 0.7–3.1)
LYMPHOCYTES NFR BLD AUTO: 12.1 % (ref 19.6–45.3)
MCH RBC QN AUTO: 30.6 PG (ref 26.6–33)
MCHC RBC AUTO-ENTMCNC: 32.9 G/DL (ref 31.5–35.7)
MCV RBC AUTO: 92.9 FL (ref 79–97)
MONOCYTES # BLD AUTO: 1.21 10*3/MM3 (ref 0.1–0.9)
MONOCYTES NFR BLD AUTO: 8.8 % (ref 5–12)
NEUTROPHILS NFR BLD AUTO: 10.63 10*3/MM3 (ref 1.7–7)
NEUTROPHILS NFR BLD AUTO: 77.8 % (ref 42.7–76)
NRBC BLD AUTO-RTO: 0 /100 WBC (ref 0–0.2)
PLATELET # BLD AUTO: 247 10*3/MM3 (ref 140–450)
PMV BLD AUTO: 11.1 FL (ref 6–12)
POTASSIUM SERPL-SCNC: 4.1 MMOL/L (ref 3.5–5.2)
PROT SERPL-MCNC: 7 G/DL (ref 6–8.5)
RBC # BLD AUTO: 4.35 10*6/MM3 (ref 3.77–5.28)
SODIUM SERPL-SCNC: 138 MMOL/L (ref 136–145)
WBC NRBC COR # BLD: 13.68 10*3/MM3 (ref 3.4–10.8)

## 2022-12-16 PROCEDURE — 83690 ASSAY OF LIPASE: CPT | Performed by: STUDENT IN AN ORGANIZED HEALTH CARE EDUCATION/TRAINING PROGRAM

## 2022-12-16 PROCEDURE — 96374 THER/PROPH/DIAG INJ IV PUSH: CPT

## 2022-12-16 PROCEDURE — 25010000002 ONDANSETRON PER 1 MG: Performed by: STUDENT IN AN ORGANIZED HEALTH CARE EDUCATION/TRAINING PROGRAM

## 2022-12-16 PROCEDURE — 85025 COMPLETE CBC W/AUTO DIFF WBC: CPT | Performed by: STUDENT IN AN ORGANIZED HEALTH CARE EDUCATION/TRAINING PROGRAM

## 2022-12-16 PROCEDURE — 80053 COMPREHEN METABOLIC PANEL: CPT | Performed by: STUDENT IN AN ORGANIZED HEALTH CARE EDUCATION/TRAINING PROGRAM

## 2022-12-16 PROCEDURE — 36415 COLL VENOUS BLD VENIPUNCTURE: CPT

## 2022-12-16 PROCEDURE — 74177 CT ABD & PELVIS W/CONTRAST: CPT

## 2022-12-16 PROCEDURE — 25010000002 IOPAMIDOL 61 % SOLUTION: Performed by: STUDENT IN AN ORGANIZED HEALTH CARE EDUCATION/TRAINING PROGRAM

## 2022-12-16 PROCEDURE — 25010000002 MORPHINE PER 10 MG: Performed by: STUDENT IN AN ORGANIZED HEALTH CARE EDUCATION/TRAINING PROGRAM

## 2022-12-16 PROCEDURE — 96375 TX/PRO/DX INJ NEW DRUG ADDON: CPT

## 2022-12-16 PROCEDURE — 99283 EMERGENCY DEPT VISIT LOW MDM: CPT

## 2022-12-16 PROCEDURE — 83605 ASSAY OF LACTIC ACID: CPT | Performed by: STUDENT IN AN ORGANIZED HEALTH CARE EDUCATION/TRAINING PROGRAM

## 2022-12-16 RX ORDER — ONDANSETRON 2 MG/ML
4 INJECTION INTRAMUSCULAR; INTRAVENOUS ONCE
Status: COMPLETED | OUTPATIENT
Start: 2022-12-16 | End: 2022-12-16

## 2022-12-16 RX ORDER — SODIUM CHLORIDE, SODIUM LACTATE, POTASSIUM CHLORIDE, CALCIUM CHLORIDE 600; 310; 30; 20 MG/100ML; MG/100ML; MG/100ML; MG/100ML
125 INJECTION, SOLUTION INTRAVENOUS CONTINUOUS
Status: DISCONTINUED | OUTPATIENT
Start: 2022-12-16 | End: 2022-12-16

## 2022-12-16 RX ORDER — SODIUM CHLORIDE 0.9 % (FLUSH) 0.9 %
10 SYRINGE (ML) INJECTION AS NEEDED
Status: DISCONTINUED | OUTPATIENT
Start: 2022-12-16 | End: 2022-12-17 | Stop reason: HOSPADM

## 2022-12-16 RX ADMIN — MORPHINE SULFATE 4 MG: 4 INJECTION, SOLUTION INTRAMUSCULAR; INTRAVENOUS at 21:01

## 2022-12-16 RX ADMIN — ONDANSETRON 4 MG: 2 INJECTION INTRAMUSCULAR; INTRAVENOUS at 21:00

## 2022-12-16 RX ADMIN — SODIUM CHLORIDE, POTASSIUM CHLORIDE, SODIUM LACTATE AND CALCIUM CHLORIDE 1000 ML: 600; 310; 30; 20 INJECTION, SOLUTION INTRAVENOUS at 21:01

## 2022-12-16 RX ADMIN — IOPAMIDOL 100 ML: 612 INJECTION, SOLUTION INTRAVENOUS at 23:04

## 2022-12-17 VITALS
OXYGEN SATURATION: 98 % | DIASTOLIC BLOOD PRESSURE: 89 MMHG | SYSTOLIC BLOOD PRESSURE: 124 MMHG | WEIGHT: 138 LBS | HEIGHT: 62 IN | TEMPERATURE: 98.5 F | BODY MASS INDEX: 25.4 KG/M2 | RESPIRATION RATE: 20 BRPM | HEART RATE: 77 BPM

## 2022-12-17 PROCEDURE — 96376 TX/PRO/DX INJ SAME DRUG ADON: CPT

## 2022-12-17 PROCEDURE — 25010000002 MORPHINE PER 10 MG: Performed by: STUDENT IN AN ORGANIZED HEALTH CARE EDUCATION/TRAINING PROGRAM

## 2022-12-17 RX ORDER — AMOXICILLIN AND CLAVULANATE POTASSIUM 875; 125 MG/1; MG/1
1 TABLET, FILM COATED ORAL EVERY 12 HOURS
Qty: 20 TABLET | Refills: 0 | Status: SHIPPED | OUTPATIENT
Start: 2022-12-17 | End: 2022-12-17 | Stop reason: SDUPTHER

## 2022-12-17 RX ORDER — AMOXICILLIN AND CLAVULANATE POTASSIUM 875; 125 MG/1; MG/1
1 TABLET, FILM COATED ORAL EVERY 12 HOURS
Qty: 20 TABLET | Refills: 0 | Status: SHIPPED | OUTPATIENT
Start: 2022-12-17

## 2022-12-17 RX ORDER — OXYCODONE HYDROCHLORIDE 5 MG/1
5 TABLET ORAL EVERY 8 HOURS PRN
Qty: 6 TABLET | Refills: 0 | Status: SHIPPED | OUTPATIENT
Start: 2022-12-17 | End: 2022-12-19

## 2022-12-17 RX ORDER — OXYCODONE HYDROCHLORIDE 5 MG/1
5 TABLET ORAL EVERY 8 HOURS PRN
Qty: 6 TABLET | Refills: 0 | Status: SHIPPED | OUTPATIENT
Start: 2022-12-17 | End: 2022-12-17 | Stop reason: SDUPTHER

## 2022-12-17 RX ORDER — ACETAMINOPHEN 325 MG/1
650 TABLET ORAL EVERY 6 HOURS PRN
Qty: 80 TABLET | Refills: 0 | Status: SHIPPED | OUTPATIENT
Start: 2022-12-17 | End: 2022-12-17 | Stop reason: SDUPTHER

## 2022-12-17 RX ORDER — ACETAMINOPHEN 325 MG/1
650 TABLET ORAL EVERY 6 HOURS PRN
Qty: 80 TABLET | Refills: 0 | Status: SHIPPED | OUTPATIENT
Start: 2022-12-17 | End: 2022-12-27

## 2022-12-17 RX ADMIN — MORPHINE SULFATE 4 MG: 4 INJECTION, SOLUTION INTRAMUSCULAR; INTRAVENOUS at 00:11

## 2022-12-17 NOTE — ED PROVIDER NOTES
Subjective   History of Present Illness    Patient is a pleasant 42-year-old female with chief complaint of abdominal pain nausea and fever.  The patient describes about 3 days ago, she had lower abdominal discomfort felt similar to menstrual cramps.  She normally longer has menstrual cycles so she thought that was odd.  She then developed a fever 101 and the pain began to escalate.  She tried Tylenol which provided transient relief.  She notes when she moves, the pain worsens.  She does complain of nausea without any vomiting.  She noticed she had some mucousy stool twice today that occurred after she had eaten.  She noted some little mucus vaginally as well.  She denies any dysuria, hematuria, or flank pain.  She reports feeling similar pain when she diverticulitis back in 2018.  Prior to her diverticulitis episode, the patient did complete a colonoscopy which showed benign polyps.  She denies any other intra-abdominal infection.  She is otherwise healthy.  With her tubal ligation, she denies being pregnant.    Review of Systems   Constitutional: Positive for activity change and fever.   HENT: Negative.    Respiratory: Negative.    Cardiovascular: Negative.    Gastrointestinal: Positive for abdominal pain and nausea. Negative for vomiting.   Genitourinary: Positive for decreased urine volume.   Musculoskeletal: Negative.    Skin: Negative.    Neurological: Negative.    Psychiatric/Behavioral: Negative.    All other systems reviewed and are negative.      Past Medical History:   Diagnosis Date   • Headache        Allergies   Allergen Reactions   • Sulfa Antibiotics        Past Surgical History:   Procedure Laterality Date   • CARPAL TUNNEL RELEASE Bilateral    •  SECTION     • COLONOSCOPY         History reviewed. No pertinent family history.    Social History     Socioeconomic History   • Marital status:    Tobacco Use   • Smoking status: Every Day     Packs/day: 2.00     Types: Cigarettes  "      Prior to Admission medications    Medication Sig Start Date End Date Taking? Authorizing Provider   hydroCHLOROthiazide (MICROZIDE) 12.5 MG capsule Take 1 capsule by mouth Daily. 1/13/20   Ricky Pittman MD   LORazepam (ATIVAN) 1 MG tablet Take 1 tablet by mouth Daily As Needed for Anxiety. 1/2/20   Ricky Pittman MD   metoprolol tartrate (LOPRESSOR) 25 MG tablet Take 1 tablet by mouth 2 (Two) Times a Day. 1/13/20   Ricky Pittman MD   rOPINIRole (REQUIP) 1 MG tablet Take 1 tablet by mouth every night at bedtime. Take 1-3  hours before bedtime. 5/26/22          Medications   sodium chloride 0.9 % flush 10 mL (has no administration in time range)   morphine injection 4 mg (has no administration in time range)   lactated ringers bolus 1,000 mL (0 mL Intravenous Stopped 12/16/22 2325)   morphine injection 4 mg (4 mg Intravenous Given 12/16/22 2101)   ondansetron (ZOFRAN) injection 4 mg (4 mg Intravenous Given 12/16/22 2100)   iopamidol (ISOVUE-300) 61 % injection 100 mL (100 mL Intravenous Given 12/16/22 2304)       /71 (BP Location: Right arm, Patient Position: Lying)   Pulse 90   Temp 98.2 °F (36.8 °C) (Oral)   Resp 18   Ht 157.5 cm (62\")   Wt 62.6 kg (138 lb)   SpO2 96%   BMI 25.24 kg/m²       Objective   Physical Exam  Vitals and nursing note reviewed.   Constitutional:       General: She is not in acute distress.     Appearance: She is well-developed. She is not diaphoretic.   HENT:      Head: Normocephalic and atraumatic.   Eyes:      Conjunctiva/sclera: Conjunctivae normal.      Pupils: Pupils are equal, round, and reactive to light.   Neck:      Trachea: No tracheal deviation.   Cardiovascular:      Rate and Rhythm: Normal rate and regular rhythm.      Heart sounds: Normal heart sounds. No murmur heard.  Pulmonary:      Effort: Pulmonary effort is normal.      Breath sounds: Normal breath sounds.   Abdominal:      General: Bowel sounds are normal. There is no " distension.      Palpations: Abdomen is soft. There is no mass.      Tenderness: There is abdominal tenderness in the periumbilical area and left lower quadrant. There is no guarding or rebound.   Musculoskeletal:         General: Normal range of motion.      Cervical back: Normal range of motion and neck supple.   Skin:     General: Skin is warm and dry.      Capillary Refill: Capillary refill takes less than 2 seconds.   Neurological:      General: No focal deficit present.      Mental Status: She is alert and oriented to person, place, and time.   Psychiatric:         Behavior: Behavior normal.         Thought Content: Thought content normal.         Judgment: Judgment normal.         Procedures         Lab Results (last 24 hours)     Procedure Component Value Units Date/Time    CBC & Differential [[id number]]  (Abnormal) Collected: 12/16/22 1945    Specimen: Blood Updated: 12/16/22 2016    Narrative:      The following orders were created for panel order CBC & Differential.  Procedure                               Abnormality         Status                     ---------                               -----------         ------                     CBC Auto Differential[[id number]]        Abnormal            Final result                 Please view results for these tests on the individual orders.    Comprehensive Metabolic Panel [[id number]]  (Abnormal) Collected: 12/16/22 1945    Specimen: Blood Updated: 12/16/22 2224     Glucose 109 mg/dL      BUN 13 mg/dL      Creatinine 0.50 mg/dL      Sodium 138 mmol/L      Potassium 4.1 mmol/L      Comment: Slight hemolysis detected by analyzer. Results may be affected.        Chloride 103 mmol/L      CO2 23.0 mmol/L      Calcium 9.1 mg/dL      Total Protein 7.0 g/dL      Albumin 4.20 g/dL      ALT (SGPT) 23 U/L      AST (SGOT) 18 U/L      Alkaline Phosphatase 81 U/L      Total Bilirubin 0.5 mg/dL      Globulin 2.8 gm/dL      A/G Ratio 1.5 g/dL      BUN/Creatinine Ratio 26.0      Anion Gap 12.0 mmol/L      eGFR 120.3 mL/min/1.73      Comment: National Kidney Foundation and American Society of Nephrology (ASN) Task Force recommended calculation based on the Chronic Kidney Disease Epidemiology Collaboration (CKD-EPI) equation refit without adjustment for race.       Narrative:      GFR Normal >60  Chronic Kidney Disease <60  Kidney Failure <15      Lipase [[id number]]  (Normal) Collected: 12/16/22 1945    Specimen: Blood Updated: 12/16/22 2219     Lipase 22 U/L     Lactic Acid, Plasma [[id number]]  (Normal) Collected: 12/16/22 1945    Specimen: Blood Updated: 12/16/22 2034     Lactate 0.9 mmol/L     CBC Auto Differential [[id number]]  (Abnormal) Collected: 12/16/22 1945    Specimen: Blood Updated: 12/16/22 2016     WBC 13.68 10*3/mm3      RBC 4.35 10*6/mm3      Hemoglobin 13.3 g/dL      Hematocrit 40.4 %      MCV 92.9 fL      MCH 30.6 pg      MCHC 32.9 g/dL      RDW 13.2 %      RDW-SD 45.1 fl      MPV 11.1 fL      Platelets 247 10*3/mm3      Neutrophil % 77.8 %      Lymphocyte % 12.1 %      Monocyte % 8.8 %      Eosinophil % 0.6 %      Basophil % 0.3 %      Immature Grans % 0.4 %      Neutrophils, Absolute 10.63 10*3/mm3      Lymphocytes, Absolute 1.66 10*3/mm3      Monocytes, Absolute 1.21 10*3/mm3      Eosinophils, Absolute 0.08 10*3/mm3      Basophils, Absolute 0.04 10*3/mm3      Immature Grans, Absolute 0.06 10*3/mm3      nRBC 0.0 /100 WBC           No results found.    ED Course  ED Course as of 12/17/22 0009   Sat Dec 17, 2022   0006 I reassessed the patient.  Patient reports the pain medication did provide relief as of now the patient's pain started to recur.  Her white blood cell count is 13.  The CT study has returned that she does have evidence of diverticulitis without any abscess or perforation.  This has been relayed to the patient.  We will prescribe her Augmentin upon discharge as well as short course of pain medication.  Recommend follow-up primary care provider.  Avoid nuts and  seeds.  Strict return precaution advised.  Patient voiced understanding and she will be discharged in stable condition. [TK]   0009 YAMIL query complete. Treatment plan to include limited course of prescribed  controlled substance. Risks including addiction, benefits, and alternatives presented to patient.   [TK]      ED Course User Index  [TK] Silvana Jacobs PA          [facility]    Final diagnoses:   Acute diverticulitis          Silvana Jacobs PA  12/17/22 0009

## 2023-01-31 ENCOUNTER — TRANSCRIBE ORDERS (OUTPATIENT)
Dept: ADMINISTRATIVE | Facility: HOSPITAL | Age: 43
End: 2023-01-31
Payer: COMMERCIAL

## 2023-01-31 ENCOUNTER — LAB (OUTPATIENT)
Dept: LAB | Facility: HOSPITAL | Age: 43
End: 2023-01-31
Payer: COMMERCIAL

## 2023-01-31 DIAGNOSIS — N95.1 SYMPTOMATIC MENOPAUSAL OR FEMALE CLIMACTERIC STATES: ICD-10-CM

## 2023-01-31 DIAGNOSIS — N95.1 SYMPTOMATIC MENOPAUSAL OR FEMALE CLIMACTERIC STATES: Primary | ICD-10-CM

## 2023-01-31 LAB
FSH SERPL-ACNC: 8.6 MIU/ML
LH SERPL-ACNC: 14.2 MIU/ML

## 2023-01-31 PROCEDURE — 36415 COLL VENOUS BLD VENIPUNCTURE: CPT

## 2023-01-31 PROCEDURE — 83001 ASSAY OF GONADOTROPIN (FSH): CPT

## 2023-01-31 PROCEDURE — 83002 ASSAY OF GONADOTROPIN (LH): CPT

## 2023-01-31 PROCEDURE — 82672 ASSAY OF ESTROGEN: CPT

## 2023-01-31 PROCEDURE — 84403 ASSAY OF TOTAL TESTOSTERONE: CPT

## 2023-02-02 LAB — ESTROGEN SERPL-MCNC: 114 PG/ML

## 2023-02-10 LAB — TESTOST SERPL-MCNC: 10 NG/DL

## 2023-07-31 ENCOUNTER — TRANSCRIBE ORDERS (OUTPATIENT)
Dept: ADMINISTRATIVE | Facility: HOSPITAL | Age: 43
End: 2023-07-31
Payer: COMMERCIAL

## 2023-07-31 ENCOUNTER — LAB (OUTPATIENT)
Dept: LAB | Facility: HOSPITAL | Age: 43
End: 2023-07-31
Payer: COMMERCIAL

## 2023-07-31 DIAGNOSIS — G43.909 ACUTE MIGRAINE: ICD-10-CM

## 2023-07-31 DIAGNOSIS — G43.909 ACUTE MIGRAINE: Primary | ICD-10-CM

## 2023-07-31 LAB
ALBUMIN SERPL-MCNC: 4.4 G/DL (ref 3.5–5.2)
ALBUMIN/GLOB SERPL: 1.8 G/DL
ALP SERPL-CCNC: 86 U/L (ref 39–117)
ALT SERPL W P-5'-P-CCNC: 15 U/L (ref 1–33)
ANION GAP SERPL CALCULATED.3IONS-SCNC: 10 MMOL/L (ref 5–15)
AST SERPL-CCNC: 17 U/L (ref 1–32)
BASOPHILS # BLD AUTO: 0.07 10*3/MM3 (ref 0–0.2)
BASOPHILS NFR BLD AUTO: 0.9 % (ref 0–1.5)
BILIRUB SERPL-MCNC: <0.2 MG/DL (ref 0–1.2)
BUN SERPL-MCNC: 8 MG/DL (ref 6–20)
BUN/CREAT SERPL: 10.1 (ref 7–25)
CALCIUM SPEC-SCNC: 8.8 MG/DL (ref 8.6–10.5)
CHLORIDE SERPL-SCNC: 111 MMOL/L (ref 98–107)
CHOLEST SERPL-MCNC: 161 MG/DL (ref 0–200)
CO2 SERPL-SCNC: 23 MMOL/L (ref 22–29)
CREAT SERPL-MCNC: 0.79 MG/DL (ref 0.57–1)
DEPRECATED RDW RBC AUTO: 45.6 FL (ref 37–54)
EGFRCR SERPLBLD CKD-EPI 2021: 95.9 ML/MIN/1.73
EOSINOPHIL # BLD AUTO: 0.43 10*3/MM3 (ref 0–0.4)
EOSINOPHIL NFR BLD AUTO: 5.5 % (ref 0.3–6.2)
ERYTHROCYTE [DISTWIDTH] IN BLOOD BY AUTOMATED COUNT: 13.3 % (ref 12.3–15.4)
GLOBULIN UR ELPH-MCNC: 2.5 GM/DL
GLUCOSE SERPL-MCNC: 110 MG/DL (ref 65–99)
HCT VFR BLD AUTO: 46.7 % (ref 34–46.6)
HDLC SERPL-MCNC: 27 MG/DL (ref 40–60)
HGB BLD-MCNC: 15 G/DL (ref 12–15.9)
IMM GRANULOCYTES # BLD AUTO: 0.04 10*3/MM3 (ref 0–0.05)
IMM GRANULOCYTES NFR BLD AUTO: 0.5 % (ref 0–0.5)
LDLC SERPL CALC-MCNC: 95 MG/DL (ref 0–100)
LDLC/HDLC SERPL: 3.3 {RATIO}
LYMPHOCYTES # BLD AUTO: 1.69 10*3/MM3 (ref 0.7–3.1)
LYMPHOCYTES NFR BLD AUTO: 21.8 % (ref 19.6–45.3)
MCH RBC QN AUTO: 29.7 PG (ref 26.6–33)
MCHC RBC AUTO-ENTMCNC: 32.1 G/DL (ref 31.5–35.7)
MCV RBC AUTO: 92.5 FL (ref 79–97)
MONOCYTES # BLD AUTO: 0.84 10*3/MM3 (ref 0.1–0.9)
MONOCYTES NFR BLD AUTO: 10.8 % (ref 5–12)
NEUTROPHILS NFR BLD AUTO: 4.68 10*3/MM3 (ref 1.7–7)
NEUTROPHILS NFR BLD AUTO: 60.5 % (ref 42.7–76)
NRBC BLD AUTO-RTO: 0 /100 WBC (ref 0–0.2)
PLATELET # BLD AUTO: 275 10*3/MM3 (ref 140–450)
PMV BLD AUTO: 11.6 FL (ref 6–12)
POTASSIUM SERPL-SCNC: 4 MMOL/L (ref 3.5–5.2)
PROT SERPL-MCNC: 6.9 G/DL (ref 6–8.5)
RBC # BLD AUTO: 5.05 10*6/MM3 (ref 3.77–5.28)
SODIUM SERPL-SCNC: 144 MMOL/L (ref 136–145)
TRIGL SERPL-MCNC: 225 MG/DL (ref 0–150)
TSH SERPL DL<=0.05 MIU/L-ACNC: 1.97 UIU/ML (ref 0.27–4.2)
VLDLC SERPL-MCNC: 39 MG/DL (ref 5–40)
WBC NRBC COR # BLD: 7.75 10*3/MM3 (ref 3.4–10.8)

## 2023-07-31 PROCEDURE — 80050 GENERAL HEALTH PANEL: CPT

## 2023-07-31 PROCEDURE — 80061 LIPID PANEL: CPT

## 2023-07-31 PROCEDURE — 36415 COLL VENOUS BLD VENIPUNCTURE: CPT

## 2023-08-02 ENCOUNTER — TRANSCRIBE ORDERS (OUTPATIENT)
Dept: ADMINISTRATIVE | Facility: HOSPITAL | Age: 43
End: 2023-08-02
Payer: COMMERCIAL

## 2023-08-02 DIAGNOSIS — G43.909 MIGRAINE WITHOUT STATUS MIGRAINOSUS, NOT INTRACTABLE, UNSPECIFIED MIGRAINE TYPE: Primary | ICD-10-CM

## 2023-08-14 ENCOUNTER — HOSPITAL ENCOUNTER (OUTPATIENT)
Dept: CT IMAGING | Facility: HOSPITAL | Age: 43
Discharge: HOME | End: 2023-08-14
Admitting: FAMILY MEDICINE
Payer: COMMERCIAL

## 2023-08-14 DIAGNOSIS — G43.909 MIGRAINE WITHOUT STATUS MIGRAINOSUS, NOT INTRACTABLE, UNSPECIFIED MIGRAINE TYPE: ICD-10-CM

## 2023-08-14 PROCEDURE — 25510000001 IOPAMIDOL 61 % SOLUTION: Performed by: FAMILY MEDICINE

## 2023-08-14 PROCEDURE — 70470 CT HEAD/BRAIN W/O & W/DYE: CPT

## 2023-08-14 RX ADMIN — IOPAMIDOL 100 ML: 612 INJECTION, SOLUTION INTRAVENOUS at 15:58

## 2023-08-31 ENCOUNTER — LAB (OUTPATIENT)
Dept: LAB | Facility: HOSPITAL | Age: 43
End: 2023-08-31
Payer: COMMERCIAL

## 2023-08-31 ENCOUNTER — TRANSCRIBE ORDERS (OUTPATIENT)
Dept: ADMINISTRATIVE | Facility: HOSPITAL | Age: 43
End: 2023-08-31
Payer: COMMERCIAL

## 2023-08-31 DIAGNOSIS — R51.9 NONINTRACTABLE HEADACHE, UNSPECIFIED CHRONICITY PATTERN, UNSPECIFIED HEADACHE TYPE: Primary | ICD-10-CM

## 2023-08-31 DIAGNOSIS — R51.9 NONINTRACTABLE HEADACHE, UNSPECIFIED CHRONICITY PATTERN, UNSPECIFIED HEADACHE TYPE: ICD-10-CM

## 2023-08-31 LAB
CRP SERPL-MCNC: 0.33 MG/DL (ref 0–0.5)
ERYTHROCYTE [SEDIMENTATION RATE] IN BLOOD: 3 MM/HR (ref 0–20)

## 2023-08-31 PROCEDURE — 36415 COLL VENOUS BLD VENIPUNCTURE: CPT

## 2023-08-31 PROCEDURE — 85652 RBC SED RATE AUTOMATED: CPT

## 2023-08-31 PROCEDURE — 86140 C-REACTIVE PROTEIN: CPT

## 2023-11-27 NOTE — PROGRESS NOTES
"YOB: 1980  Location: [facility]  Location Address: [address] [place] [address], [facility], [address] [place] [address]  Location Phone: [phone]    Chief Complaint   Patient presents with    Headache     Ct scan states chronic pansinusitis       History of Present Illness  Gloria Melgoza is a 43 y.o. female.  Gloria Melgoza is here for evaluation of ENT complaints. The patient has had problems with nasal congestion and headaches  She complains of nasal congestion daily as well as headaches  She states the headaches are \"all over\" and typically not located in one certain location   Patient does complain of intermittent sinus pressure   She is using flonase and astelin daily and has been on this for 2 months. She had ct head performed due to daily headaches which showed chronic sinusitis   She is not currently on reflux medications   She smokes 1.5 ppd     EPWORTH: 7       Past Medical History:   Diagnosis Date    Headache        Past Surgical History:   Procedure Laterality Date    CARPAL TUNNEL RELEASE Bilateral      SECTION      COLONOSCOPY         Outpatient Medications Marked as Taking for the 23 encounter (Office Visit) with Jadiel Smith MD   Medication Sig Dispense Refill    azelastine (ASTELIN) 0.1 % nasal spray Instill 1 spray into the nostril(s) as directed by provider 2 (Two) Times a Day. Use in each nostril as directed 30 mL 2    eszopiclone (LUNESTA) 3 MG tablet Take 1 tablet by mouth Every Night. Take immediately before bedtime 90 tablet 0    fluticasone (FLONASE) 50 MCG/ACT nasal spray 1 spray by Each Nare route Daily. 16 g 1    propranolol LA (INDERAL LA) 80 MG 24 hr capsule Take 1 capsule by mouth Daily. 30 capsule 3    rOPINIRole (REQUIP) 1 MG tablet Take 1 tablet by mouth every night 1-3 hours before bedtime. Take 1 hour before bedtime. 90 tablet 3    tiZANidine (ZANAFLEX) 4 MG tablet Take 1 tablet by mouth at bedtime as needed for muscle spasms. 30 tablet 2    " topiramate (TOPAMAX) 50 MG tablet Take 1 tablet by mouth twice daily. 60 tablet 6       Sulfa antibiotics    History reviewed. No pertinent family history.    Social History     Socioeconomic History    Marital status:    Tobacco Use    Smoking status: Every Day     Packs/day: 1.50     Years: 30.00     Additional pack years: 0.00     Total pack years: 45.00     Types: Cigarettes   Vaping Use    Vaping Use: Never used   Substance and Sexual Activity    Alcohol use: Yes     Comment: occ    Drug use: Never       Review of Systems   Constitutional: Negative.    HENT:  Positive for congestion and sinus pressure.    Neurological:  Positive for headaches.       Vitals:    11/28/23 1510   BP: 139/93   Pulse: 83   Temp: 98.6 °F (37 °C)       Body mass index is 27.98 kg/m².    Objective     Physical Exam  Vitals reviewed.   Constitutional:       Appearance: Normal appearance. She is normal weight.   HENT:      Head: Normocephalic.      Right Ear: Tympanic membrane, ear canal and external ear normal.      Left Ear: Tympanic membrane, ear canal and external ear normal.      Nose: Septal deviation present.      Mouth/Throat:      Lips: Pink.      Mouth: Mucous membranes are moist.      Pharynx: Uvula midline.      Tonsils: 1+ on the right. 1+ on the left.   Neurological:      Mental Status: She is alert.       Gloria Melgoza  reports that she has been smoking cigarettes. She has a 45.00 pack-year smoking history. She does not have any smokeless tobacco history on file.. I have educated her on the risk of diseases from using tobacco products such as cancer, COPD, and heart disease.     I advised her to quit and she is willing to quit. We have discussed the following method/s for tobacco cessation:  Education Material Prescription Medicaiton.  Together we have set a quit date for 1 month from today.  She will follow up with me in 2 months or sooner to check on her progress.    I spent 5 minutes counseling the  patient.          Assessment & Plan   Diagnoses and all orders for this visit:    1. Tobacco use (Primary)    2. Sinus pressure    3. Nasal congestion    Other orders  -     amoxicillin (AMOXIL) 500 MG capsule; Take 1 capsule by mouth 3 (Three) Times a Day for 14 days.  Dispense: 42 capsule; Refill: 0  -     omeprazole (priLOSEC) 40 MG capsule; Take 1 capsule by mouth 2 (Two) Times a Day for 30 days. Take 30 minutes to 1 hour before meals  Dispense: 60 capsule; Refill: 2  -     nicotine (Nicoderm CQ) 21 MG/24HR patch; Place 1 patch on the skin as directed by provider Daily for 30 days.  Dispense: 30 patch; Refill: 2      * Surgery not found *  No orders of the defined types were placed in this encounter.    No follow-ups on file.     Milk/dairy elimination discussed  Reflux precautions   Continue nasal sprays   Antibiotics prior to ct scan   Smoking cessation discussed     Patient Instructions   For the best response, use your nasal sprays every day without skipping doses. It may take several weeks before the full effect is acheived.  Gastroesophageal Reflux Disease (Laryngopharyngeal Reflux), Adult  Gastroesophageal reflux disease (GERD) and/or Laryngopharyngeal Reflux, (LPR) happens when acid from your stomach flows up into the esophagus and/or throat and voicebox or larynx. When acid comes in contact with the these organs, the acid can cause soreness (inflammation). Over time, GERD may create small holes (ulcers) in the lining of the esophagus and may lead to the development of hoarseness, difficulty swallowing,   feeling of something stuck in the throat, increased mucous or drainage and even predispose to the development of malignancies, (cancer).    CAUSES   Increased body weight. This puts pressure on the stomach, making acid rise from the stomach into the esophagus.  Smoking. This increases acid production in the stomach.  Drinking alcohol. This causes decreased pressure in the lower esophageal sphincter  (valve or ring of muscle between the esophagus and stomach), allowing acid from the stomach into the esophagus.  Late evening meals and a full stomach. This increases pressure and acid production in the stomach.  A malformed lower esophageal sphincter  Diet which can include avoidance of gluten and dairy products  Age  SYMPTOMS   Burning pain in the lower part of the mid-chest behind the breastbone and in the mid-stomach area. This may occur twice a week or more often.  Trouble swallowing.  Sore throat.  Dry cough.  Asthma-like symptoms including chest tightness, shortness of breath, or wheezing.  Globus sensation-something stuck in the throat/fullness  Hoarseness  DIAGNOSIS   Your caregiver may be able to diagnose GERD based on your symptoms. In some cases, X-rays and other tests may be done to check for complications or to check the condition of your stomach and esophagus.  You may need to see another doctor.  TREATMENT   Over-the-counter or prescription medicines to help decrease acid production.   Dietary and behavioral modifications or changes may be also recommended.  HOME CARE INSTRUCTIONS   Change the factors that you can control. Ask your caregiver for guidance concerning weight loss, quitting smoking, and alcohol consumption.  Avoid foods and drinks that make your symptoms worse, and MAY include such as:  Caffeine or alcoholic drinks.  Chocolate.  Gluten containing foods  Dairy  Peppermint or mint flavorings.  Garlic and onions.  Spicy foods.  Citrus fruits, such as oranges, marcelo, or limes.  Tomato-based foods such as sauce, chili, salsa, and pizza.  Fried and fatty foods.  Avoid lying down for the 3 hours prior to your bedtime or prior to taking a nap.  Eat small, frequent meals instead of large meals.  Wear loose-fitting clothing. Do not wear anything tight around your waist that causes pressure on your stomach.  Raise the head of your bed 6 to 8 inches with wood blocks to help you sleep. Extra pillows  will not help.  Only take over-the-counter or prescription medicines for pain, discomfort, or fever as directed by your caregiver.  Do not take aspirin, ibuprofen, or other nonsteroidal anti-inflammatory drugs if possible (NSAIDs).  SEEK IMMEDIATE MEDICAL CARE IF:   You have pain in your arms, neck, jaw, teeth, or back.  Your pain increases or changes in intensity or duration.  You develop nausea, vomiting, or sweating (diaphoresis).  You develop shortness of breath, or you faint.  Your vomit is green, yellow, black, or looks like coffee grounds or blood.  Your stool is red, bloody, or black.  These symptoms could be signs of other problems, such as heart disease, gastric bleeding, or esophageal bleeding.  MAKE SURE YOU:   Understand these instructions.  Will watch your condition.  Will get help right away if you are not doing well or get worse.     This information is not intended to replace advice given to you by your physician. Make sure you discuss any questions you have with your health care provider.     Modified by Jadiel Smith MD, FACS 9/8/2016.  Document Released: 09/27/2006 Document Revised: 01/08/2016 Document Reviewed: 04/13/2016  [employer] Interactive Patient Education ©2016 [employer] I advised the patient of the risks in continuing to use tobacco and recommended complete cessation, The inherent risks including the risk of disability, development of a malignancy and/or death was discussed.  The patient indicated understanding.

## 2023-11-28 ENCOUNTER — OFFICE VISIT (OUTPATIENT)
Dept: OTOLARYNGOLOGY | Facility: CLINIC | Age: 43
End: 2023-11-28
Payer: COMMERCIAL

## 2023-11-28 VITALS
TEMPERATURE: 98.6 F | WEIGHT: 153 LBS | BODY MASS INDEX: 28.16 KG/M2 | HEART RATE: 83 BPM | DIASTOLIC BLOOD PRESSURE: 93 MMHG | HEIGHT: 62 IN | SYSTOLIC BLOOD PRESSURE: 139 MMHG

## 2023-11-28 DIAGNOSIS — Z72.0 TOBACCO USE: Primary | ICD-10-CM

## 2023-11-28 DIAGNOSIS — J34.89 SINUS PRESSURE: ICD-10-CM

## 2023-11-28 DIAGNOSIS — R09.81 NASAL CONGESTION: ICD-10-CM

## 2023-11-28 RX ORDER — NICOTINE 21 MG/24HR
1 PATCH, TRANSDERMAL 24 HOURS TRANSDERMAL EVERY 24 HOURS
Qty: 30 PATCH | Refills: 2 | Status: SHIPPED | OUTPATIENT
Start: 2023-11-28 | End: 2024-01-24

## 2023-11-28 RX ORDER — OMEPRAZOLE 40 MG/1
40 CAPSULE, DELAYED RELEASE ORAL 2 TIMES DAILY
Qty: 60 CAPSULE | Refills: 2 | Status: SHIPPED | OUTPATIENT
Start: 2023-11-28 | End: 2024-02-27

## 2023-11-28 RX ORDER — AMOXICILLIN 500 MG/1
500 CAPSULE ORAL 3 TIMES DAILY
Qty: 42 CAPSULE | Refills: 0 | Status: SHIPPED | OUTPATIENT
Start: 2023-11-28 | End: 2023-12-13

## 2023-11-28 NOTE — PATIENT INSTRUCTIONS
For the best response, use your nasal sprays every day without skipping doses. It may take several weeks before the full effect is acheived.  Gastroesophageal Reflux Disease (Laryngopharyngeal Reflux), Adult  Gastroesophageal reflux disease (GERD) and/or Laryngopharyngeal Reflux, (LPR) happens when acid from your stomach flows up into the esophagus and/or throat and voicebox or larynx. When acid comes in contact with the these organs, the acid can cause soreness (inflammation). Over time, GERD may create small holes (ulcers) in the lining of the esophagus and may lead to the development of hoarseness, difficulty swallowing,   feeling of something stuck in the throat, increased mucous or drainage and even predispose to the development of malignancies, (cancer).    CAUSES   Increased body weight. This puts pressure on the stomach, making acid rise from the stomach into the esophagus.  Smoking. This increases acid production in the stomach.  Drinking alcohol. This causes decreased pressure in the lower esophageal sphincter (valve or ring of muscle between the esophagus and stomach), allowing acid from the stomach into the esophagus.  Late evening meals and a full stomach. This increases pressure and acid production in the stomach.  A malformed lower esophageal sphincter  Diet which can include avoidance of gluten and dairy products  Age  SYMPTOMS   Burning pain in the lower part of the mid-chest behind the breastbone and in the mid-stomach area. This may occur twice a week or more often.  Trouble swallowing.  Sore throat.  Dry cough.  Asthma-like symptoms including chest tightness, shortness of breath, or wheezing.  Globus sensation-something stuck in the throat/fullness  Hoarseness  DIAGNOSIS   Your caregiver may be able to diagnose GERD based on your symptoms. In some cases, X-rays and other tests may be done to check for complications or to check the condition of your stomach and esophagus.  You may need to see  another doctor.  TREATMENT   Over-the-counter or prescription medicines to help decrease acid production.   Dietary and behavioral modifications or changes may be also recommended.  HOME CARE INSTRUCTIONS   Change the factors that you can control. Ask your caregiver for guidance concerning weight loss, quitting smoking, and alcohol consumption.  Avoid foods and drinks that make your symptoms worse, and MAY include such as:  Caffeine or alcoholic drinks.  Chocolate.  Gluten containing foods  Dairy  Peppermint or mint flavorings.  Garlic and onions.  Spicy foods.  Citrus fruits, such as oranges, marcelo, or limes.  Tomato-based foods such as sauce, chili, salsa, and pizza.  Fried and fatty foods.  Avoid lying down for the 3 hours prior to your bedtime or prior to taking a nap.  Eat small, frequent meals instead of large meals.  Wear loose-fitting clothing. Do not wear anything tight around your waist that causes pressure on your stomach.  Raise the head of your bed 6 to 8 inches with wood blocks to help you sleep. Extra pillows will not help.  Only take over-the-counter or prescription medicines for pain, discomfort, or fever as directed by your caregiver.  Do not take aspirin, ibuprofen, or other nonsteroidal anti-inflammatory drugs if possible (NSAIDs).  SEEK IMMEDIATE MEDICAL CARE IF:   You have pain in your arms, neck, jaw, teeth, or back.  Your pain increases or changes in intensity or duration.  You develop nausea, vomiting, or sweating (diaphoresis).  You develop shortness of breath, or you faint.  Your vomit is green, yellow, black, or looks like coffee grounds or blood.  Your stool is red, bloody, or black.  These symptoms could be signs of other problems, such as heart disease, gastric bleeding, or esophageal bleeding.  MAKE SURE YOU:   Understand these instructions.  Will watch your condition.  Will get help right away if you are not doing well or get worse.     This information is not intended to replace  advice given to you by your physician. Make sure you discuss any questions you have with your health care provider.     Modified by Jadiel Smith MD, FACS 9/8/2016.  Document Released: 09/27/2006 Document Revised: 01/08/2016 Document Reviewed: 04/13/2016  [employer] Interactive Patient Education ©2016 [employer] I advised the patient of the risks in continuing to use tobacco and recommended complete cessation, The inherent risks including the risk of disability, development of a malignancy and/or death was discussed.  The patient indicated understanding.

## 2023-12-15 DIAGNOSIS — J34.89 SINUS PRESSURE: Primary | ICD-10-CM

## 2024-01-03 ENCOUNTER — HOSPITAL ENCOUNTER (OUTPATIENT)
Dept: CT IMAGING | Facility: HOSPITAL | Age: 44
Discharge: HOME | End: 2024-01-03
Admitting: NURSE PRACTITIONER
Payer: COMMERCIAL

## 2024-01-03 DIAGNOSIS — J34.89 SINUS PRESSURE: ICD-10-CM

## 2024-01-03 PROCEDURE — 70486 CT MAXILLOFACIAL W/O DYE: CPT

## 2024-01-05 ENCOUNTER — TELEPHONE (OUTPATIENT)
Dept: OTOLARYNGOLOGY | Facility: CLINIC | Age: 44
End: 2024-01-05
Payer: COMMERCIAL

## 2024-01-05 NOTE — TELEPHONE ENCOUNTER
----- Message from IVAN Guzman sent at 1/5/2024  8:47 AM CST -----  Please call patient with results will discuss further at f/u

## 2024-01-08 NOTE — PROGRESS NOTES
YOB: 1980  Location: [facility]  Location Address: [address] [place] [address], [facility], [address] [place] [address]  Location Phone: [phone]    Chief Complaint   Patient presents with    Sinus Problem       History of Present Illness  Gloria Melgoza is a 43 y.o. female.  Gloria Melgoza is here for follow up of ENT complaints. The patient has had problems with nasal drainage, nasal congestion, facial pain, facial pressure, and headaches  The symptoms greater on the right side of the face and head. The patient has had moderate symptoms. The symptoms have been present for the last several months There have been no identified factors that aggravate the symptoms. There have been no factors that have improved the symptoms. Patient states she has been using flonase and astelin with no help. Patient has had a CT sinus. She smokes 1.5 ppd.    She improved on p.o. antibiotics recurred quickly following discontinuation.  She can tell the difference between her migraine headaches and periorbital pain and pressure of the sinus disease which is primarily on the right.        Study Result    Narrative & Impression   CT SINUS WO CONTRAST- 1/3/2024 1:46 PM     HISTORY: sinus headache; J34.89-Other specified disorders of nose and  nasal sinuses     COMPARISON: NONE.      DOSE LENGTH PRODUCT: 432.04 mGy.cm. Automated exposure control was also  utilized to decrease patient radiation dose.     TECHNIQUE: Helical tomographic images of the sinuses were obtained  without contrast. Multiplanar reformatted images were provided for  review.     FINDINGS:     Right maxillary sinus is mostly opacified and the right maxillary  infundibulum is completely obstructed. The left maxillary sinus and left  maxillary infundibulum are clear. Minimal mucosal thickening in the  right frontal recess and there is a solitary opacified right anterior  ethmoid air cell. Left frontal sinus and left anterior ethmoids are  clear. Mucosal thickening  in the right sphenoethmoidal recess with  partial opacification of the right posterior ethmoids. The left  posterior ethmoids are clear. Bilateral sphenoid sinuses are clear. [place]  right septal bowing. Normal configuration of the turbinates. Anterior  skull base is intact. Facial bones are intact. Normal alignment at the  TMJs. Mastoids are clear. Visualized intracranial contents are  unremarkable. No inflammatory changes in the deep neck. Nasopharyngeal  airway is unremarkable.       IMPRESSION:  1. Chronic paranasal sinus disease, predominantly right maxillary  infundibulum pattern. There is also a mild right-sided ethmoid disease.  2. [place] right septal bowing.  3. Normal configuration of the turbinates.              This report was signed and finalized on 1/3/2024 3:02 PM by Dr David Ashley.        [place] [facility]     Past Medical History:   Diagnosis Date    Asthma     Increasing shortness of breath in revent months    Dental disease     Almost all top teeth are crowns    Dizziness     Always attributed to blood pressure    Headache     Nosebleed     Sinusitis     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       Past Surgical History:   Procedure Laterality Date    CARPAL TUNNEL RELEASE Bilateral      SECTION      COLONOSCOPY         Outpatient Medications Marked as Taking for the 24 encounter (Office Visit) with Jadiel Smith MD   Medication Sig Dispense Refill    azelastine (ASTELIN) 0.1 % nasal spray Instill 1 spray into the nostril(s) as directed by provider 2 (Two) Times a Day. Use in each nostril as directed 30 mL 2    eszopiclone (LUNESTA) 3 MG tablet Take 1 tablet by mouth every night at bedtime. Take immediately before bedtime 90 tablet 0    fluticasone (FLONASE) 50 MCG/ACT nasal spray 1 spray by Each Nare route Daily. 16 g 1    nicotine (Nicoderm CQ) 21 MG/24HR patch Place 1 patch on the skin as directed by provider Daily for 30 days. 30 patch 2    omeprazole (priLOSEC)  40 MG capsule Take 1 capsule by mouth 2 (Two) Times a Day for 30 days. Take 30 minutes to 1 hour before meals 60 capsule 2    propranolol LA (INDERAL LA) 80 MG 24 hr capsule Take 1 capsule by mouth Daily. 30 capsule 3    rOPINIRole (REQUIP) 1 MG tablet Take 1 tablet by mouth every night 1-3 hours before bedtime. Take 1 hour before bedtime. 90 tablet 3    tiZANidine (ZANAFLEX) 4 MG tablet Take 1 tablet by mouth at bedtime as needed for muscle spasms. 30 tablet 2    topiramate (TOPAMAX) 50 MG tablet Take 1 tablet by mouth twice daily. 60 tablet 6    [DISCONTINUED] rOPINIRole (REQUIP) 1 MG tablet Take 1 tablet by mouth every night 1 to 3 hours before bedtime. 90 tablet 2       Sulfa antibiotics    Family History   Problem Relation Age of Onset    Seizures Mother     Migraines Mother     Hypertension Father        Social History     Socioeconomic History    Marital status:    Tobacco Use    Smoking status: Every Day     Packs/day: 0.50     Years: 20.00     Additional pack years: 0.00     Total pack years: 10.00     Types: Cigarettes    Smokeless tobacco: Never    Tobacco comments:     Down from 2 packs a day to 0.5 packs with patch    Vaping Use    Vaping Use: Never used   Substance and Sexual Activity    Alcohol use: Yes     Alcohol/week: 2.0 standard drinks of alcohol     Types: 2 Glasses of wine per week     Comment: occ    Drug use: Never       Review of Systems  Except for HPI  Vitals:    01/11/24 1525   BP: 116/84   Pulse: 67   Resp: 16   Temp: 98 °F (36.7 °C)       Body mass index is 27.44 kg/m².    Objective     Physical Exam  CONSTITUTIONAL: well nourished, well-developed, alert, oriented, in no acute distress     COMMUNICATION AND VOICE: able to communicate normally, normal voice quality    HEAD: normocephalic, no lesions, atraumatic, no tenderness, no masses     FACE: appearance normal, no lesions, no tenderness, no deformities, facial motion symmetric    EYES: ocular motility normal, eyelids normal,  orbits normal, no proptosis, conjunctiva normal , pupils equal, round     EARS:  Hearing: hearing to conversational voice intact bilaterally   External Ears: normal bilaterally, no lesions    NOSE:  External Nose: external nasal structure normal, no tenderness on palpation, no nasal discharge, no lesions, no evidence of trauma, nostrils patent   Intranasal exam: See endoscopy    ORAL:  Lips: upper and lower lips without lesion     NECK:  Inspection and Palpation: neck appearance normal, no masses or tenderness    CHEST/RESPIRATORY: normal respiratory effort     CARDIOVASCULAR: no cyanosis or edema     NEUROLOGICAL/PSYCHIATRIC: oriented to time, place and person, mood normal, affect appropriate, CN II-XII intact grossly     Assessment & Plan   PROCEDURE NOTE    Gloria Melgoza    DATE OF PROCEDURE: 1/11/2024    PROCEDURE:   Bilateral Diagnostic Rigid Nasal Endoscopy    PREPROCEDURE DIAGNOSIS:   Chronic recurrent rhinosinusitis with intermittent right periorbital pain and pressure persistent despite antibiotics    POSTPROCEDURE DIAGNOSIS:  SAME    ANESTHESIA:   None    PROCEDURE DESCRIPTION:    With the patient in the chair bilateral diagnostic rigid nasal endoscopy was performed with the Stortz 0° endoscope without difficulty.     An endoscope was passed along the left nasal floor to the nasopharynx.  It was then passed into the region of the middle meatus, middle turbinate, and the sphenoethmoid region. An identical procedure was performed on the right side.  The following findings were noted as stated below:    Findings: Moderate crusting bilaterally intranasally and the vestibule particularly right greater than left.  No intranasal polyposis is appreciated but moderate erythema and edema on the right particular in the middle meatus without gross purulence bilaterally.  No intranasal polyposis is appreciated bilaterally.    Condition:  Stable.  Patient tolerated procedure well.    Complications:  None  There was  no significant bleeding.Diagnoses and all orders for this visit:    1. Chronic rhinosinusitis (Primary)    2. Nasal septal deviation    3. Migraine with aura and without status migrainosus, not intractable    Other orders  -     cefuroxime (CEFTIN) 500 MG tablet; Take 1 tablet by mouth 2 (Two) Times a Day for 20 days.  Dispense: 40 tablet; Refill: 0  -     mupirocin (BACTROBAN) 2 % nasal ointment; Apply to the nose twice daily until follow-up  Dispense: 3 g; Refill: 0      * Surgery not found *  No orders of the defined types were placed in this encounter.    Return in about 4 weeks (around 2/8/2024).       Patient Instructions   Antibiotics for approximately 3 weeks (total asymptomatic +7 days)  Bactroban intranasally  Continue Flonase and Astelin  Follow-up in 3 to 4 weeks    Consider functional endoscopic ethmoidectomy and septoplasty for failure to improve    I advised the patient of the risks in continuing to use tobacco and recommended complete cessation, The inherent risks including the risk of disability, development of a malignancy and/or death was discussed.  The patient indicated understanding.

## 2024-01-11 ENCOUNTER — OFFICE VISIT (OUTPATIENT)
Dept: OTOLARYNGOLOGY | Facility: CLINIC | Age: 44
End: 2024-01-11
Payer: COMMERCIAL

## 2024-01-11 VITALS
TEMPERATURE: 98 F | DIASTOLIC BLOOD PRESSURE: 84 MMHG | RESPIRATION RATE: 16 BRPM | BODY MASS INDEX: 27.6 KG/M2 | WEIGHT: 150 LBS | SYSTOLIC BLOOD PRESSURE: 116 MMHG | HEIGHT: 62 IN | HEART RATE: 67 BPM

## 2024-01-11 DIAGNOSIS — J34.2 NASAL SEPTAL DEVIATION: ICD-10-CM

## 2024-01-11 DIAGNOSIS — G43.109 MIGRAINE WITH AURA AND WITHOUT STATUS MIGRAINOSUS, NOT INTRACTABLE: ICD-10-CM

## 2024-01-11 DIAGNOSIS — J32.9 CHRONIC RHINOSINUSITIS: Primary | ICD-10-CM

## 2024-01-11 RX ORDER — CEFUROXIME AXETIL 500 MG/1
500 TABLET ORAL 2 TIMES DAILY
Qty: 40 TABLET | Refills: 0 | Status: SHIPPED | OUTPATIENT
Start: 2024-01-11 | End: 2024-01-12 | Stop reason: SDUPTHER

## 2024-01-11 NOTE — PATIENT INSTRUCTIONS
Antibiotics for approximately 3 weeks (total asymptomatic +7 days)  Bactroban intranasally  Continue Flonase and Astelin  Follow-up in 3 to 4 weeks    Consider functional endoscopic ethmoidectomy and septoplasty for failure to improve    I advised the patient of the risks in continuing to use tobacco and recommended complete cessation, The inherent risks including the risk of disability, development of a malignancy and/or death was discussed.  The patient indicated understanding.

## 2024-02-20 ENCOUNTER — OFFICE VISIT (OUTPATIENT)
Dept: OTOLARYNGOLOGY | Facility: CLINIC | Age: 44
End: 2024-02-20
Payer: COMMERCIAL

## 2024-02-20 VITALS
HEART RATE: 78 BPM | HEIGHT: 62 IN | BODY MASS INDEX: 28.71 KG/M2 | WEIGHT: 156 LBS | SYSTOLIC BLOOD PRESSURE: 120 MMHG | DIASTOLIC BLOOD PRESSURE: 80 MMHG

## 2024-02-20 DIAGNOSIS — J32.9 CHRONIC RHINOSINUSITIS: ICD-10-CM

## 2024-02-20 DIAGNOSIS — J34.2 NASAL SEPTAL DEVIATION: ICD-10-CM

## 2024-02-20 DIAGNOSIS — R09.81 NASAL CONGESTION: ICD-10-CM

## 2024-02-20 DIAGNOSIS — J34.89 SINUS PRESSURE: Primary | ICD-10-CM

## 2024-02-20 NOTE — H&P (VIEW-ONLY)
YOB: 1980  Location: [facility]  Location Address: [address] [place] [address], [facility] [address], [address] [place] [address]  Location Phone: [phone]    Chief Complaint   Patient presents with    Chronic rhiosinusitis       History of Present Illness  Gloria Melgoza is a 43 y.o. female.  Gloria Melgoza is here for follow up of ENT complaints. The patient has had problems with sinus pain and pressure and ongoing nasal congestion   She reports frequent sinus infections   She completed course of 3 weeks of oral antibiotics and reports minimal improvement in symptoms and states increased pressure returned approximately 2 weeks later  Right sided pressure > left  She has been using nasal sprays and reflux medications consistently and has decreased smoking to 1/2 ppd      CT Sinus Without Contrast (2024 14:48)      Past Medical History:   Diagnosis Date    Asthma     Increasing shortness of breath in revent months    Dental disease     Almost all top teeth are crowns    Dizziness     Always attributed to blood pressure    Headache     Nosebleed     Sinusitis     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       Past Surgical History:   Procedure Laterality Date    CARPAL TUNNEL RELEASE Bilateral      SECTION      COLONOSCOPY         Outpatient Medications Marked as Taking for the 24 encounter (Office Visit) with Jadiel Smith MD   Medication Sig Dispense Refill    mupirocin (BACTROBAN) 2 % ointment Apply to the nose twice daily until follow up 22 g 0       Sulfa antibiotics    Family History   Problem Relation Age of Onset    Seizures Mother     Migraines Mother     Hypertension Father        Social History     Socioeconomic History    Marital status:    Tobacco Use    Smoking status: Every Day     Packs/day: 0.50     Years: 20.00     Additional pack years: 0.00     Total pack years: 10.00     Types: Cigarettes    Smokeless tobacco: Never    Tobacco comments:      Down from 2 packs a day to 0.5 packs with patch    Vaping Use    Vaping Use: Never used   Substance and Sexual Activity    Alcohol use: Yes     Alcohol/week: 2.0 standard drinks of alcohol     Types: 2 Glasses of wine per week     Comment: occ    Drug use: Never    Sexual activity: Defer       Review of Systems   Constitutional:  Positive for fatigue.   HENT:  Positive for congestion, sinus pressure and sinus pain.        Vitals:    02/20/24 1342   BP: 120/80   Pulse: 78       Body mass index is 28.53 kg/m².    Objective     Physical Exam  Vitals reviewed.   Constitutional:       Appearance: Normal appearance. She is normal weight.   HENT:      Head: Normocephalic.      Right Ear: Tympanic membrane, ear canal and external ear normal.      Left Ear: Tympanic membrane, ear canal and external ear normal.      Nose: Septal deviation present.      Comments: Bilateral nasal septal crusting   Inferior turbinate hypertrophy        Mouth/Throat:      Lips: Pink.      Mouth: Mucous membranes are moist.   Neurological:      Mental Status: She is alert.         Assessment & Plan   Diagnoses and all orders for this visit:    1. Sinus pressure (Primary)  -     Case Request; Standing  -     Comprehensive Metabolic Panel; Future  -     CBC (No Diff); Future  -     Protime-INR; Future  -     APTT; Future  -     ECG 12 Lead; Future  -     XR Chest 2 View; Future    2. Nasal septal deviation  -     mupirocin (BACTROBAN) 2 % ointment; Apply to the nose twice daily until follow up  Dispense: 22 g; Refill: 0  -     Case Request; Standing  -     Comprehensive Metabolic Panel; Future  -     CBC (No Diff); Future  -     Protime-INR; Future  -     APTT; Future  -     ECG 12 Lead; Future  -     XR Chest 2 View; Future    3. Chronic rhinosinusitis  -     mupirocin (BACTROBAN) 2 % ointment; Apply to the nose twice daily until follow up  Dispense: 22 g; Refill: 0  -     Case Request; Standing  -     Comprehensive Metabolic Panel; Future  -      CBC (No Diff); Future  -     Protime-INR; Future  -     APTT; Future  -     ECG 12 Lead; Future  -     XR Chest 2 View; Future    4. Nasal congestion  -     Case Request; Standing  -     Comprehensive Metabolic Panel; Future  -     CBC (No Diff); Future  -     Protime-INR; Future  -     APTT; Future  -     ECG 12 Lead; Future  -     XR Chest 2 View; Future    Other orders  -     Follow Anesthesia Guidelines / Protocol; Future  -     Obtain Informed Consent  -     Follow Anesthesia Guidelines / Protocol; Standing  -     Verify NPO Status; Standing  -     Obtain Informed Consent; Standing  -     SCD (Sequential Compression Device) - To Be Placed on Patient in Pre-Op; Standing      ENDOSCOPIC FUNCTIONAL SINUS SURGERY W TRACKING, ETHMOIDECTOMY, SEPTOPLASTY, RESECT INFERIOR TURBINATES VIA COBLATION (Bilateral)  Orders Placed This Encounter   Procedures    XR Chest 2 View     Standing Status:   Future     Standing Expiration Date:   2/19/2025     Order Specific Question:   Reason for Exam:     Answer:   PRE OP     Order Specific Question:   Patient Pregnant     Answer:   Unknown     Order Specific Question:   Release to patient     Answer:   Routine Release [[id number]]    Comprehensive Metabolic Panel     Standing Status:   Future     Standing Expiration Date:   2/19/2025     Order Specific Question:   Release to patient     Answer:   Routine Release [[id number]]    CBC (No Diff)     Standing Status:   Future     Standing Expiration Date:   2/19/2025     Order Specific Question:   Release to patient     Answer:   Routine Release [[id number]]    Protime-INR     Standing Status:   Future     Standing Expiration Date:   2/19/2025     Order Specific Question:   Release to patient     Answer:   Routine Release [[id number]]    APTT     Standing Status:   Future     Standing Expiration Date:   2/19/2025     Order Specific Question:   Release to patient     Answer:   Routine Release [[id number]]    Obtain Informed Consent      Order Specific Question:   Informed Consent Given For     Answer:   ENDOSCOPIC FUNCTIONAL SINUS SURGERY W TRACKING, SINUPLASTY, SEPTOPLASTY, RESECT INFERIOR TURBINATES VIA COBLATION    ECG 12 Lead     Standing Status:   Future     Standing Expiration Date:   2/19/2025     Order Specific Question:   Reason for Exam:     Answer:   preop     Order Specific Question:   Release to patient     Answer:   Routine Release [[id number]]     Return for post op.     Risks vs benefits of fess, septoplasty with inferior turbinate reduction discussed   Patient wishes to proceed    Patient Instructions   FUNCTIONAL ENDOSCOPIC SINUS SURGERY: A functional endoscopic sinus surgery was recommended. The risks and benefits were explained including but not limited to pain, bleeding (with the possible need for nasal packing), infection, risks of the general anesthesia, orbital injury with blurred vision or visual loss, cerebrospinal fluid leak, persistent disease, scarring, synichiae and the possibility for the need of reoperation. Possibilities of additional sinus work or less sinus work depending on the status of the nose at the time of the operation was discussed. Alternatives were discussed. No guarantees were made or implied. Questions were asked appropriately answered.

## 2024-02-20 NOTE — PROGRESS NOTES
YOB: 1980  Location: [facility]  Location Address: [address] [place] [address], [facility], [address] [place] [address]  Location Phone: [phone]    Chief Complaint   Patient presents with    Chronic rhiosinusitis       History of Present Illness  Gloria Melgoza is a 43 y.o. female.  Gloria Melgoza is here for follow up of ENT complaints. The patient has had problems with sinus pain and pressure and ongoing nasal congestion   She reports frequent sinus infections   She completed course of 3 weeks of oral antibiotics and reports minimal improvement in symptoms and states increased pressure returned approximately 2 weeks later  Right sided pressure > left  She has been using nasal sprays and reflux medications consistently and has decreased smoking to 1/2 ppd      CT Sinus Without Contrast (2024 14:48)      Past Medical History:   Diagnosis Date    Asthma     Increasing shortness of breath in revent months    Dental disease     Almost all top teeth are crowns    Dizziness     Always attributed to blood pressure    Headache     Nosebleed     Sinusitis     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       Past Surgical History:   Procedure Laterality Date    CARPAL TUNNEL RELEASE Bilateral      SECTION      COLONOSCOPY         Outpatient Medications Marked as Taking for the 24 encounter (Office Visit) with Jadiel Smith MD   Medication Sig Dispense Refill    mupirocin (BACTROBAN) 2 % ointment Apply to the nose twice daily until follow up 22 g 0       Sulfa antibiotics    Family History   Problem Relation Age of Onset    Seizures Mother     Migraines Mother     Hypertension Father        Social History     Socioeconomic History    Marital status:    Tobacco Use    Smoking status: Every Day     Packs/day: 0.50     Years: 20.00     Additional pack years: 0.00     Total pack years: 10.00     Types: Cigarettes    Smokeless tobacco: Never    Tobacco comments:      Down from 2 packs a day to 0.5 packs with patch    Vaping Use    Vaping Use: Never used   Substance and Sexual Activity    Alcohol use: Yes     Alcohol/week: 2.0 standard drinks of alcohol     Types: 2 Glasses of wine per week     Comment: occ    Drug use: Never    Sexual activity: Defer       Review of Systems   Constitutional:  Positive for fatigue.   HENT:  Positive for congestion, sinus pressure and sinus pain.        Vitals:    02/20/24 1342   BP: 120/80   Pulse: 78       Body mass index is 28.53 kg/m².    Objective     Physical Exam  Vitals reviewed.   Constitutional:       Appearance: Normal appearance. She is normal weight.   HENT:      Head: Normocephalic.      Right Ear: Tympanic membrane, ear canal and external ear normal.      Left Ear: Tympanic membrane, ear canal and external ear normal.      Nose: Septal deviation present.      Comments: Bilateral nasal septal crusting   Inferior turbinate hypertrophy        Mouth/Throat:      Lips: Pink.      Mouth: Mucous membranes are moist.   Neurological:      Mental Status: She is alert.         Assessment & Plan   Diagnoses and all orders for this visit:    1. Sinus pressure (Primary)  -     Case Request; Standing  -     Comprehensive Metabolic Panel; Future  -     CBC (No Diff); Future  -     Protime-INR; Future  -     APTT; Future  -     ECG 12 Lead; Future  -     XR Chest 2 View; Future    2. Nasal septal deviation  -     mupirocin (BACTROBAN) 2 % ointment; Apply to the nose twice daily until follow up  Dispense: 22 g; Refill: 0  -     Case Request; Standing  -     Comprehensive Metabolic Panel; Future  -     CBC (No Diff); Future  -     Protime-INR; Future  -     APTT; Future  -     ECG 12 Lead; Future  -     XR Chest 2 View; Future    3. Chronic rhinosinusitis  -     mupirocin (BACTROBAN) 2 % ointment; Apply to the nose twice daily until follow up  Dispense: 22 g; Refill: 0  -     Case Request; Standing  -     Comprehensive Metabolic Panel; Future  -      CBC (No Diff); Future  -     Protime-INR; Future  -     APTT; Future  -     ECG 12 Lead; Future  -     XR Chest 2 View; Future    4. Nasal congestion  -     Case Request; Standing  -     Comprehensive Metabolic Panel; Future  -     CBC (No Diff); Future  -     Protime-INR; Future  -     APTT; Future  -     ECG 12 Lead; Future  -     XR Chest 2 View; Future    Other orders  -     Follow Anesthesia Guidelines / Protocol; Future  -     Obtain Informed Consent  -     Follow Anesthesia Guidelines / Protocol; Standing  -     Verify NPO Status; Standing  -     Obtain Informed Consent; Standing  -     SCD (Sequential Compression Device) - To Be Placed on Patient in Pre-Op; Standing      ENDOSCOPIC FUNCTIONAL SINUS SURGERY W TRACKING, ETHMOIDECTOMY, SEPTOPLASTY, RESECT INFERIOR TURBINATES VIA COBLATION (Bilateral)  Orders Placed This Encounter   Procedures    XR Chest 2 View     Standing Status:   Future     Standing Expiration Date:   2/19/2025     Order Specific Question:   Reason for Exam:     Answer:   PRE OP     Order Specific Question:   Patient Pregnant     Answer:   Unknown     Order Specific Question:   Release to patient     Answer:   Routine Release [[id number]]    Comprehensive Metabolic Panel     Standing Status:   Future     Standing Expiration Date:   2/19/2025     Order Specific Question:   Release to patient     Answer:   Routine Release [[id number]]    CBC (No Diff)     Standing Status:   Future     Standing Expiration Date:   2/19/2025     Order Specific Question:   Release to patient     Answer:   Routine Release [[id number]]    Protime-INR     Standing Status:   Future     Standing Expiration Date:   2/19/2025     Order Specific Question:   Release to patient     Answer:   Routine Release [[id number]]    APTT     Standing Status:   Future     Standing Expiration Date:   2/19/2025     Order Specific Question:   Release to patient     Answer:   Routine Release [[id number]]    Obtain Informed Consent      Order Specific Question:   Informed Consent Given For     Answer:   ENDOSCOPIC FUNCTIONAL SINUS SURGERY W TRACKING, SINUPLASTY, SEPTOPLASTY, RESECT INFERIOR TURBINATES VIA COBLATION    ECG 12 Lead     Standing Status:   Future     Standing Expiration Date:   2/19/2025     Order Specific Question:   Reason for Exam:     Answer:   preop     Order Specific Question:   Release to patient     Answer:   Routine Release [[id number]]     Return for post op.     Risks vs benefits of fess, septoplasty with inferior turbinate reduction discussed   Patient wishes to proceed    Patient Instructions   FUNCTIONAL ENDOSCOPIC SINUS SURGERY: A functional endoscopic sinus surgery was recommended. The risks and benefits were explained including but not limited to pain, bleeding (with the possible need for nasal packing), infection, risks of the general anesthesia, orbital injury with blurred vision or visual loss, cerebrospinal fluid leak, persistent disease, scarring, synichiae and the possibility for the need of reoperation. Possibilities of additional sinus work or less sinus work depending on the status of the nose at the time of the operation was discussed. Alternatives were discussed. No guarantees were made or implied. Questions were asked appropriately answered.

## 2024-02-20 NOTE — PATIENT INSTRUCTIONS
FUNCTIONAL ENDOSCOPIC SINUS SURGERY: A functional endoscopic sinus surgery was recommended. The risks and benefits were explained including but not limited to pain, bleeding (with the possible need for nasal packing), infection, risks of the general anesthesia, orbital injury with blurred vision or visual loss, cerebrospinal fluid leak, persistent disease, scarring, synichiae and the possibility for the need of reoperation. Possibilities of additional sinus work or less sinus work depending on the status of the nose at the time of the operation was discussed. Alternatives were discussed. No guarantees were made or implied. Questions were asked appropriately answered.

## 2024-02-21 ENCOUNTER — PRE-ADMISSION TESTING (OUTPATIENT)
Dept: PREADMISSION TESTING | Facility: HOSPITAL | Age: 44
End: 2024-02-21
Payer: COMMERCIAL

## 2024-02-21 ENCOUNTER — HOSPITAL ENCOUNTER (OUTPATIENT)
Dept: GENERAL RADIOLOGY | Facility: HOSPITAL | Age: 44
Discharge: HOME | End: 2024-02-21
Payer: COMMERCIAL

## 2024-02-21 VITALS
SYSTOLIC BLOOD PRESSURE: 108 MMHG | BODY MASS INDEX: 28.24 KG/M2 | HEIGHT: 63 IN | DIASTOLIC BLOOD PRESSURE: 77 MMHG | WEIGHT: 159.39 LBS | OXYGEN SATURATION: 98 % | HEART RATE: 69 BPM | RESPIRATION RATE: 16 BRPM

## 2024-02-21 DIAGNOSIS — J32.9 CHRONIC RHINOSINUSITIS: ICD-10-CM

## 2024-02-21 DIAGNOSIS — J34.2 NASAL SEPTAL DEVIATION: ICD-10-CM

## 2024-02-21 DIAGNOSIS — J34.89 SINUS PRESSURE: ICD-10-CM

## 2024-02-21 DIAGNOSIS — R09.81 NASAL CONGESTION: ICD-10-CM

## 2024-02-21 LAB
ALBUMIN SERPL-MCNC: 4.2 G/DL (ref 3.5–5.2)
ALBUMIN/GLOB SERPL: 1.5 G/DL
ALP SERPL-CCNC: 99 U/L (ref 39–117)
ALT SERPL W P-5'-P-CCNC: 21 U/L (ref 1–33)
ANION GAP SERPL CALCULATED.3IONS-SCNC: 10 MMOL/L (ref 5–15)
APTT PPP: 28.1 SECONDS (ref 24.5–36)
AST SERPL-CCNC: 20 U/L (ref 1–32)
BILIRUB SERPL-MCNC: 0.4 MG/DL (ref 0–1.2)
BUN SERPL-MCNC: 12 MG/DL (ref 6–20)
BUN/CREAT SERPL: 18.2 (ref 7–25)
CALCIUM SPEC-SCNC: 8.4 MG/DL (ref 8.6–10.5)
CHLORIDE SERPL-SCNC: 106 MMOL/L (ref 98–107)
CO2 SERPL-SCNC: 23 MMOL/L (ref 22–29)
CREAT SERPL-MCNC: 0.66 MG/DL (ref 0.57–1)
DEPRECATED RDW RBC AUTO: 45.1 FL (ref 37–54)
EGFRCR SERPLBLD CKD-EPI 2021: 111.8 ML/MIN/1.73
ERYTHROCYTE [DISTWIDTH] IN BLOOD BY AUTOMATED COUNT: 13.2 % (ref 12.3–15.4)
GLOBULIN UR ELPH-MCNC: 2.8 GM/DL
GLUCOSE SERPL-MCNC: 100 MG/DL (ref 65–99)
HCT VFR BLD AUTO: 44.7 % (ref 34–46.6)
HGB BLD-MCNC: 14.5 G/DL (ref 12–15.9)
INR PPP: 0.99 (ref 0.91–1.09)
MCH RBC QN AUTO: 30.4 PG (ref 26.6–33)
MCHC RBC AUTO-ENTMCNC: 32.4 G/DL (ref 31.5–35.7)
MCV RBC AUTO: 93.7 FL (ref 79–97)
PLATELET # BLD AUTO: 276 10*3/MM3 (ref 140–450)
PMV BLD AUTO: 11.3 FL (ref 6–12)
POTASSIUM SERPL-SCNC: 3.6 MMOL/L (ref 3.5–5.2)
PROT SERPL-MCNC: 7 G/DL (ref 6–8.5)
PROTHROMBIN TIME: 13.2 SECONDS (ref 11.8–14.8)
RBC # BLD AUTO: 4.77 10*6/MM3 (ref 3.77–5.28)
SODIUM SERPL-SCNC: 139 MMOL/L (ref 136–145)
WBC NRBC COR # BLD AUTO: 5.14 10*3/MM3 (ref 3.4–10.8)

## 2024-02-21 PROCEDURE — 93010 ELECTROCARDIOGRAM REPORT: CPT | Performed by: INTERNAL MEDICINE

## 2024-02-21 PROCEDURE — 85730 THROMBOPLASTIN TIME PARTIAL: CPT

## 2024-02-21 PROCEDURE — 85610 PROTHROMBIN TIME: CPT

## 2024-02-21 PROCEDURE — 80053 COMPREHEN METABOLIC PANEL: CPT

## 2024-02-21 PROCEDURE — 36415 COLL VENOUS BLD VENIPUNCTURE: CPT

## 2024-02-21 PROCEDURE — 93005 ELECTROCARDIOGRAM TRACING: CPT

## 2024-02-21 PROCEDURE — 85027 COMPLETE CBC AUTOMATED: CPT

## 2024-02-21 PROCEDURE — 71046 X-RAY EXAM CHEST 2 VIEWS: CPT

## 2024-02-21 NOTE — DISCHARGE INSTRUCTIONS
Before you come to the hospital        Arrival time: AS DIRECTED BY OFFICE     YOU MAY TAKE THE FOLLOWING MEDICATION(S) THE MORNING OF SURGERY WITH A SIP OF WATER:   OMEPRAZOLE           ALL OTHER HOME MEDICATION CHECK WITH YOUR PHYSICIAN (especially if   you are taking diabetes medicines or blood thinners)              Eating and drinking restrictions prior to scheduled arrival time    2 Hours before arrival time STOP   Drinking Clear liquids (water, black coffee-NO CREAM,  apple juice-no pulp)    Clear Liquids    Water and flavored water                                                                      Clear Fruit juices, such as cranberry juice and apple juice.  Black coffee (NO cream of any kind, including powdered).  Plain tea  Clear bouillon or broth.  Flavored gelatin.  Soda.  Gatorade or Powerade.    8 Hours before arrival time STOP   All food (includes candy, gum and mints), full liquids, and dairy products  Full liquid examples  Juices that have pulp.  Frozen ice pops that contain fruit pieces.  Coffee with creamer  Milk.  Yogurt.    (It is extremely important that you follow these guidelines to prevent delay or cancelation of your procedure)                       MANAGING PAIN AFTER SURGERY    We know you are probably wondering what your pain will be like after surgery.  Following surgery it is unrealistic to expect you will not have pain.   Pain is how our bodies let us know that something is wrong or cautions us to be careful.  That said, our goal is to make your pain tolerable.    Methods we may use to treat your pain include (oral or IV medications, PCAs, epidurals, nerve blocks, etc.)   While some procedures require IV pain medications for a short time after surgery, transitioning to pain medications by mouth allows for better management of pain.   Your nurse will encourage you to take oral pain medications whenever possible.  IV medications work almost immediately, but only last a short while.   Taking medications by mouth allows for a more constant level of medication in your blood stream for a longer period of time.      Once your pain is out of control it is harder to get back under control.  It is important you are aware when your next dose of pain medication is due.  If you are admitted, your nurse may write the time of your next dose on the white board in your room to help you remember.      We are interested in your pain and encourage you to inform us about aggravating factors during your visit.   Many times a simple repositioning every few hours can make a big difference.    If your physician says it is okay, do not let your pain prevent you from getting out of bed. Be sure to call your nurse for assistance prior to getting up so you do not fall.      Before surgery, please decide your tolerable pain goal.  These faces help describe the pain ratings we use on a 0-10 scale.   Be prepared to tell us your goal and whether or not you take pain or anxiety medications at home.          Preparing for Surgery  Preparing for surgery is an important part of your care. It can make things go more smoothly and help you avoid complications. The steps leading up to surgery may vary among hospitals. Follow all instructions given to you by your health care providers. Ask questions if you do not understand something. Talk about any concerns that you have.  Here are some questions to consider asking before your surgery:  If my surgery is not an emergency (is elective), when would be the best time to have the surgery?  What arrangements do I need to make for work, home, or school?  What will my recovery be like? How long will it be before I can return to normal activities?  Will I need to prepare my home? Will I need to arrange care for me or my children?  Should I expect to have pain after surgery? What are my pain management options? Are there nonmedical options that I can try for pain?  Tell a health care provider  about:  Any allergies you have.  All medicines you are taking, including vitamins, herbs, eye drops, creams, and over-the-counter medicines.  Any problems you or family members have had with anesthetic medicines.  Any blood disorders you have.  Any surgeries you have had.  Any medical conditions you have.  Whether you are pregnant or may be pregnant.  What are the risks?  The risks and complications of surgery depend on the specific procedure that you have. Discuss all the risks with your health care providers before your surgery. Ask about common surgical complications, which may include:  Infection.  Bleeding or a need for blood replacement (transfusion).  Allergic reactions to medicines.  Damage to surrounding nerves, tissues, or structures.  A blood clot.  Scarring.  Failure of the surgery to correct the problem.  Follow these instructions before the procedure:  Several days or weeks before your procedure  You may have a physical exam by your primary health care provider to make sure it is safe for you to have surgery.  You may have testing. This may include a chest X-ray, blood and urine tests, electrocardiogram (ECG), or other testing.  Ask your health care provider about:  Changing or stopping your regular medicines. This is especially important if you are taking diabetes medicines or blood thinners.  Taking medicines such as aspirin and ibuprofen. These medicines can thin your blood. Do not take these medicines unless your health care provider tells you to take them.  Taking over-the-counter medicines, vitamins, herbs, and supplements.  Do not use any products that contain nicotine or tobacco, such as cigarettes and e-cigarettes. If you need help quitting, ask your health care provider.  Avoid alcohol.  Ask your health care provider if there are exercises you can do to prepare for surgery.  Eat a healthy diet.   Plan to have someone 18 years of age or older to take you home from the hospital. We will need to  verify your ride on the morning of surgery if you are being discharged home on the same day. Tell your ride to be expecting a call from the hospital prior to your procedure.   Plan to have a responsible adult care for you for at least 24 hours after you leave the hospital or clinic. This is important.  The day before your procedure  You may be given antibiotic medicine to take by mouth to help prevent infection. Take it as told by your health care provider.  You may be asked to shower with a germ-killing soap.  Follow instructions from your health care provider about eating and drinking restrictions.   Pack comfortable clothes according to your procedure.   The day of your procedure  You may need to take another shower with a germ-killing soap before you leave home in the morning.  With a small sip of water, take only the medicines that you are told to take.  Remove all jewelry including rings.   Leave anything you consider valuable at home except hearing aids if needed.  You do not need to bring your home medications into the hospital.   Do not wear any makeup, nail polish, powder, deodorant, lotion, hair accessories, or anything on your skin or body except your clothes.  If you will be staying in the hospital, bring a case to hold your glasses, contacts, or dentures. You may also want to bring your robe and non-skid footwear.       (Do not use denture adhesives since you will be asked to remove them during  surgery).   If you wear oxygen at home, bring it with you the day of surgery.  If instructed by your health care provider, bring your sleep apnea device with you on the day of your surgery (if this applies to you).  You may want to leave your suitcase and sleep apnea device in the car until after surgery.   Arrive at the hospital as scheduled.  Bring a friend or family member with you who can help to answer questions and be present while you meet with your health care provider.  At the hospital  When you arrive  at the hospital:  Go to registration located at the main entrance of the hospital. You will be registered and given a beeper and a sticker sheet. Take the stickers to the Outpatient nurses desk and place in the black tray. This is to notify staff that you have arrived. Then return to the lobby to wait.   When your beeper lights up and vibrates proceed through the double doors, under the stairs, and a member of the Outpatient Surgery staff will escort you to your preoperative room.  You may have to wear compression sleeves. These help to prevent blood clots and reduce swelling in your legs.  An IV may be inserted into one of your veins.              In the operating room, you may be given one or more of the following:        A medicine to help you relax (sedative).        A medicine to numb the area (local anesthetic).        A medicine to make you fall asleep (general anesthetic).        A medicine that is injected into an area of your body to numb everything below the                      injection site (regional anesthetic).  You may be given an antibiotic through your IV to help prevent infection.  Your surgical site will be marked or identified.    Contact a health care provider if you:  Develop a fever of more than 100.4°F (38°C) or other feelings of illness during the 48 hours before your surgery.  Have symptoms that get worse.  Have questions or concerns about your surgery.  Summary  Preparing for surgery can make the procedure go more smoothly and lower your risk of complications.  Before surgery, make a list of questions and concerns to discuss with your surgeon. Ask about the risks and possible complications.  In the days or weeks before your surgery, follow all instructions from your health care provider. You may need to stop smoking, avoid alcohol, follow eating restrictions, and change or stop your regular medicines.  Contact your surgeon if you develop a fever or other signs of illness during the few  days before your surgery.  This information is not intended to replace advice given to you by your health care provider. Make sure you discuss any questions you have with your health care provider.  Document Revised: 12/21/2018 Document Reviewed: 10/23/2018  Elsevier Patient Education © 2021 [employer]

## 2024-02-22 ENCOUNTER — ANESTHESIA EVENT (OUTPATIENT)
Dept: PERIOP | Facility: HOSPITAL | Age: 44
End: 2024-02-22
Payer: COMMERCIAL

## 2024-02-22 LAB
QT INTERVAL: 434 MS
QTC INTERVAL: 468 MS

## 2024-02-23 ENCOUNTER — HOSPITAL ENCOUNTER (OUTPATIENT)
Facility: HOSPITAL | Age: 44
Setting detail: HOSPITAL OUTPATIENT SURGERY
Discharge: HOME | End: 2024-02-23
Attending: OTOLARYNGOLOGY | Admitting: OTOLARYNGOLOGY
Payer: COMMERCIAL

## 2024-02-23 ENCOUNTER — ANESTHESIA (OUTPATIENT)
Dept: PERIOP | Facility: HOSPITAL | Age: 44
End: 2024-02-23
Payer: COMMERCIAL

## 2024-02-23 VITALS
RESPIRATION RATE: 16 BRPM | HEART RATE: 86 BPM | OXYGEN SATURATION: 94 % | DIASTOLIC BLOOD PRESSURE: 83 MMHG | TEMPERATURE: 98 F | SYSTOLIC BLOOD PRESSURE: 121 MMHG

## 2024-02-23 DIAGNOSIS — R09.81 NASAL CONGESTION: Primary | ICD-10-CM

## 2024-02-23 DIAGNOSIS — J32.9 CHRONIC RHINOSINUSITIS: ICD-10-CM

## 2024-02-23 DIAGNOSIS — J34.89 SINUS PRESSURE: ICD-10-CM

## 2024-02-23 DIAGNOSIS — J34.2 NASAL SEPTAL DEVIATION: ICD-10-CM

## 2024-02-23 LAB — B-HCG UR QL: NEGATIVE

## 2024-02-23 PROCEDURE — 31254 NSL/SINS NDSC W/PRTL ETHMDCT: CPT | Performed by: OTOLARYNGOLOGY

## 2024-02-23 PROCEDURE — 25010000002 ONDANSETRON PER 1 MG: Performed by: NURSE ANESTHETIST, CERTIFIED REGISTERED

## 2024-02-23 PROCEDURE — 25010000002 FENTANYL CITRATE (PF) 50 MCG/ML SOLUTION: Performed by: NURSE ANESTHETIST, CERTIFIED REGISTERED

## 2024-02-23 PROCEDURE — 25010000002 VASOPRESSIN 20 UNIT/ML SOLUTION: Performed by: NURSE ANESTHETIST, CERTIFIED REGISTERED

## 2024-02-23 PROCEDURE — 25810000003 LACTATED RINGERS PER 1000 ML: Performed by: OTOLARYNGOLOGY

## 2024-02-23 PROCEDURE — 25010000002 PROPOFOL 10 MG/ML EMULSION: Performed by: NURSE ANESTHETIST, CERTIFIED REGISTERED

## 2024-02-23 PROCEDURE — 31256 EXPLORATION MAXILLARY SINUS: CPT | Performed by: OTOLARYNGOLOGY

## 2024-02-23 PROCEDURE — 88305 TISSUE EXAM BY PATHOLOGIST: CPT | Performed by: OTOLARYNGOLOGY

## 2024-02-23 PROCEDURE — C9046 COCAINE HCL NASAL SOLUTION: HCPCS | Performed by: OTOLARYNGOLOGY

## 2024-02-23 PROCEDURE — 30802 ABLATE INF TURBINATE SUBMUC: CPT | Performed by: OTOLARYNGOLOGY

## 2024-02-23 PROCEDURE — 25010000002 DEXAMETHASONE PER 1 MG: Performed by: ANESTHESIOLOGY

## 2024-02-23 PROCEDURE — 81025 URINE PREGNANCY TEST: CPT | Performed by: OTOLARYNGOLOGY

## 2024-02-23 PROCEDURE — 30520 REPAIR OF NASAL SEPTUM: CPT | Performed by: OTOLARYNGOLOGY

## 2024-02-23 PROCEDURE — C2625 STENT, NON-COR, TEM W/DEL SY: HCPCS | Performed by: OTOLARYNGOLOGY

## 2024-02-23 PROCEDURE — 25010000002 DEXAMETHASONE PER 1 MG: Performed by: NURSE ANESTHETIST, CERTIFIED REGISTERED

## 2024-02-23 PROCEDURE — 88311 DECALCIFY TISSUE: CPT | Performed by: OTOLARYNGOLOGY

## 2024-02-23 PROCEDURE — 25010000002 COCAINE HCL 40 MG/ML SOLUTION: Performed by: OTOLARYNGOLOGY

## 2024-02-23 DEVICE — STPLR SEPTL ENTACT .5X3MM: Type: IMPLANTABLE DEVICE | Site: NOSE | Status: FUNCTIONAL

## 2024-02-23 DEVICE — PROPEL SINUS IMPLANT
Type: IMPLANTABLE DEVICE | Site: NOSE | Status: FUNCTIONAL
Brand: PROPEL

## 2024-02-23 RX ORDER — COCAINE HYDROCHLORIDE 40 MG/ML
SOLUTION NASAL AS NEEDED
Status: DISCONTINUED | OUTPATIENT
Start: 2024-02-23 | End: 2024-02-23 | Stop reason: HOSPADM

## 2024-02-23 RX ORDER — OXYMETAZOLINE HYDROCHLORIDE 0.05 G/100ML
2 SPRAY NASAL
Status: COMPLETED | OUTPATIENT
Start: 2024-02-23 | End: 2024-02-23

## 2024-02-23 RX ORDER — DROPERIDOL 2.5 MG/ML
0.62 INJECTION, SOLUTION INTRAMUSCULAR; INTRAVENOUS ONCE AS NEEDED
Status: DISCONTINUED | OUTPATIENT
Start: 2024-02-23 | End: 2024-02-23 | Stop reason: HOSPADM

## 2024-02-23 RX ORDER — LABETALOL HYDROCHLORIDE 5 MG/ML
5 INJECTION, SOLUTION INTRAVENOUS
Status: DISCONTINUED | OUTPATIENT
Start: 2024-02-23 | End: 2024-02-23 | Stop reason: HOSPADM

## 2024-02-23 RX ORDER — IBUPROFEN 600 MG/1
600 TABLET ORAL ONCE AS NEEDED
Status: DISCONTINUED | OUTPATIENT
Start: 2024-02-23 | End: 2024-02-23 | Stop reason: HOSPADM

## 2024-02-23 RX ORDER — LIDOCAINE HYDROCHLORIDE 20 MG/ML
INJECTION, SOLUTION EPIDURAL; INFILTRATION; INTRACAUDAL; PERINEURAL AS NEEDED
Status: DISCONTINUED | OUTPATIENT
Start: 2024-02-23 | End: 2024-02-23 | Stop reason: SURG

## 2024-02-23 RX ORDER — ONDANSETRON 2 MG/ML
4 INJECTION INTRAMUSCULAR; INTRAVENOUS ONCE AS NEEDED
Status: DISCONTINUED | OUTPATIENT
Start: 2024-02-23 | End: 2024-02-23 | Stop reason: HOSPADM

## 2024-02-23 RX ORDER — HYDROCODONE BITARTRATE AND ACETAMINOPHEN 5; 325 MG/1; MG/1
1 TABLET ORAL EVERY 8 HOURS PRN
Qty: 8 TABLET | Refills: 0 | Status: SHIPPED | OUTPATIENT
Start: 2024-02-23

## 2024-02-23 RX ORDER — SODIUM CHLORIDE/ALOE VERA
GEL (GRAM) NASAL AS NEEDED
Status: DISCONTINUED | OUTPATIENT
Start: 2024-02-23 | End: 2024-02-23 | Stop reason: HOSPADM

## 2024-02-23 RX ORDER — MIDAZOLAM HYDROCHLORIDE 1 MG/ML
1 INJECTION INTRAMUSCULAR; INTRAVENOUS
Status: DISCONTINUED | OUTPATIENT
Start: 2024-02-23 | End: 2024-02-23 | Stop reason: HOSPADM

## 2024-02-23 RX ORDER — SODIUM CHLORIDE, SODIUM LACTATE, POTASSIUM CHLORIDE, CALCIUM CHLORIDE 600; 310; 30; 20 MG/100ML; MG/100ML; MG/100ML; MG/100ML
9 INJECTION, SOLUTION INTRAVENOUS CONTINUOUS
Status: DISCONTINUED | OUTPATIENT
Start: 2024-02-23 | End: 2024-02-23 | Stop reason: HOSPADM

## 2024-02-23 RX ORDER — COCAINE HYDROCHLORIDE 40 MG/ML
SOLUTION NASAL
Status: DISCONTINUED
Start: 2024-02-23 | End: 2024-02-23 | Stop reason: HOSPADM

## 2024-02-23 RX ORDER — PROPOFOL 10 MG/ML
VIAL (ML) INTRAVENOUS AS NEEDED
Status: DISCONTINUED | OUTPATIENT
Start: 2024-02-23 | End: 2024-02-23 | Stop reason: SURG

## 2024-02-23 RX ORDER — HYDROCODONE BITARTRATE AND ACETAMINOPHEN 5; 325 MG/1; MG/1
1 TABLET ORAL ONCE AS NEEDED
Status: DISCONTINUED | OUTPATIENT
Start: 2024-02-23 | End: 2024-02-23 | Stop reason: HOSPADM

## 2024-02-23 RX ORDER — FLUMAZENIL 0.1 MG/ML
0.2 INJECTION INTRAVENOUS AS NEEDED
Status: DISCONTINUED | OUTPATIENT
Start: 2024-02-23 | End: 2024-02-23 | Stop reason: HOSPADM

## 2024-02-23 RX ORDER — DEXTROSE MONOHYDRATE 25 G/50ML
12.5 INJECTION, SOLUTION INTRAVENOUS AS NEEDED
Status: DISCONTINUED | OUTPATIENT
Start: 2024-02-23 | End: 2024-02-23 | Stop reason: HOSPADM

## 2024-02-23 RX ORDER — SODIUM CHLORIDE 0.9 % (FLUSH) 0.9 %
3 SYRINGE (ML) INJECTION AS NEEDED
Status: DISCONTINUED | OUTPATIENT
Start: 2024-02-23 | End: 2024-02-23 | Stop reason: HOSPADM

## 2024-02-23 RX ORDER — DEXAMETHASONE SODIUM PHOSPHATE 4 MG/ML
INJECTION, SOLUTION INTRA-ARTICULAR; INTRALESIONAL; INTRAMUSCULAR; INTRAVENOUS; SOFT TISSUE AS NEEDED
Status: DISCONTINUED | OUTPATIENT
Start: 2024-02-23 | End: 2024-02-23 | Stop reason: SURG

## 2024-02-23 RX ORDER — BUPIVACAINE HCL/0.9 % NACL/PF 0.125 %
PLASTIC BAG, INJECTION (ML) EPIDURAL AS NEEDED
Status: DISCONTINUED | OUTPATIENT
Start: 2024-02-23 | End: 2024-02-23 | Stop reason: SURG

## 2024-02-23 RX ORDER — LIDOCAINE HYDROCHLORIDE AND EPINEPHRINE 10; 10 MG/ML; UG/ML
INJECTION, SOLUTION INFILTRATION; PERINEURAL AS NEEDED
Status: DISCONTINUED | OUTPATIENT
Start: 2024-02-23 | End: 2024-02-23 | Stop reason: HOSPADM

## 2024-02-23 RX ORDER — FENTANYL CITRATE 50 UG/ML
INJECTION, SOLUTION INTRAMUSCULAR; INTRAVENOUS AS NEEDED
Status: DISCONTINUED | OUTPATIENT
Start: 2024-02-23 | End: 2024-02-23 | Stop reason: SURG

## 2024-02-23 RX ORDER — SODIUM CHLORIDE, SODIUM LACTATE, POTASSIUM CHLORIDE, CALCIUM CHLORIDE 600; 310; 30; 20 MG/100ML; MG/100ML; MG/100ML; MG/100ML
INJECTION, SOLUTION INTRAVENOUS CONTINUOUS PRN
Status: COMPLETED | OUTPATIENT
Start: 2024-02-23 | End: 2024-02-23

## 2024-02-23 RX ORDER — HYDROCODONE BITARTRATE AND ACETAMINOPHEN 10; 325 MG/1; MG/1
1 TABLET ORAL EVERY 4 HOURS PRN
Status: DISCONTINUED | OUTPATIENT
Start: 2024-02-23 | End: 2024-02-23 | Stop reason: HOSPADM

## 2024-02-23 RX ORDER — FENTANYL CITRATE 50 UG/ML
25 INJECTION, SOLUTION INTRAMUSCULAR; INTRAVENOUS
Status: DISCONTINUED | OUTPATIENT
Start: 2024-02-23 | End: 2024-02-23 | Stop reason: HOSPADM

## 2024-02-23 RX ORDER — HYDROCODONE BITARTRATE AND ACETAMINOPHEN 5; 325 MG/1; MG/1
1 TABLET ORAL ONCE AS NEEDED
Status: COMPLETED | OUTPATIENT
Start: 2024-02-23 | End: 2024-02-23

## 2024-02-23 RX ORDER — ROCURONIUM BROMIDE 10 MG/ML
INJECTION, SOLUTION INTRAVENOUS AS NEEDED
Status: DISCONTINUED | OUTPATIENT
Start: 2024-02-23 | End: 2024-02-23 | Stop reason: SURG

## 2024-02-23 RX ORDER — SUCCINYLCHOLINE/SOD CL,ISO/PF 200MG/10ML
SYRINGE (ML) INTRAVENOUS AS NEEDED
Status: DISCONTINUED | OUTPATIENT
Start: 2024-02-23 | End: 2024-02-23 | Stop reason: SURG

## 2024-02-23 RX ORDER — MAGNESIUM HYDROXIDE 1200 MG/15ML
LIQUID ORAL AS NEEDED
Status: DISCONTINUED | OUTPATIENT
Start: 2024-02-23 | End: 2024-02-23 | Stop reason: HOSPADM

## 2024-02-23 RX ORDER — SCOLOPAMINE TRANSDERMAL SYSTEM 1 MG/1
1 PATCH, EXTENDED RELEASE TRANSDERMAL ONCE
Status: DISCONTINUED | OUTPATIENT
Start: 2024-02-23 | End: 2024-02-23 | Stop reason: HOSPADM

## 2024-02-23 RX ORDER — DEXAMETHASONE SODIUM PHOSPHATE 4 MG/ML
4 INJECTION, SOLUTION INTRA-ARTICULAR; INTRALESIONAL; INTRAMUSCULAR; INTRAVENOUS; SOFT TISSUE ONCE AS NEEDED
Status: COMPLETED | OUTPATIENT
Start: 2024-02-23 | End: 2024-02-23

## 2024-02-23 RX ORDER — ONDANSETRON 2 MG/ML
INJECTION INTRAMUSCULAR; INTRAVENOUS AS NEEDED
Status: DISCONTINUED | OUTPATIENT
Start: 2024-02-23 | End: 2024-02-23 | Stop reason: SURG

## 2024-02-23 RX ORDER — SODIUM CHLORIDE, SODIUM LACTATE, POTASSIUM CHLORIDE, CALCIUM CHLORIDE 600; 310; 30; 20 MG/100ML; MG/100ML; MG/100ML; MG/100ML
1000 INJECTION, SOLUTION INTRAVENOUS CONTINUOUS
Status: DISCONTINUED | OUTPATIENT
Start: 2024-02-23 | End: 2024-02-23 | Stop reason: HOSPADM

## 2024-02-23 RX ORDER — NALOXONE HCL 0.4 MG/ML
0.4 VIAL (ML) INJECTION AS NEEDED
Status: DISCONTINUED | OUTPATIENT
Start: 2024-02-23 | End: 2024-02-23 | Stop reason: HOSPADM

## 2024-02-23 RX ORDER — SODIUM CHLORIDE 0.9 % (FLUSH) 0.9 %
10 SYRINGE (ML) INJECTION AS NEEDED
Status: DISCONTINUED | OUTPATIENT
Start: 2024-02-23 | End: 2024-02-23 | Stop reason: HOSPADM

## 2024-02-23 RX ORDER — AMOXICILLIN 500 MG/1
500 CAPSULE ORAL 3 TIMES DAILY
Qty: 30 CAPSULE | Refills: 0 | Status: SHIPPED | OUTPATIENT
Start: 2024-02-23 | End: 2024-03-04

## 2024-02-23 RX ORDER — SODIUM CHLORIDE 0.9 % (FLUSH) 0.9 %
10 SYRINGE (ML) INJECTION EVERY 12 HOURS SCHEDULED
Status: DISCONTINUED | OUTPATIENT
Start: 2024-02-23 | End: 2024-02-23 | Stop reason: HOSPADM

## 2024-02-23 RX ADMIN — Medication 100 MCG: at 09:50

## 2024-02-23 RX ADMIN — ROCURONIUM BROMIDE 5 MG: 10 INJECTION, SOLUTION INTRAVENOUS at 09:11

## 2024-02-23 RX ADMIN — FENTANYL CITRATE 100 MCG: 50 INJECTION, SOLUTION INTRAMUSCULAR; INTRAVENOUS at 09:11

## 2024-02-23 RX ADMIN — HYDROCODONE BITARTRATE AND ACETAMINOPHEN 1 TABLET: 5; 325 TABLET ORAL at 10:47

## 2024-02-23 RX ADMIN — OXYMETAZOLINE HYDROCHLORIDE 2 SPRAY: 0.05 SPRAY NASAL at 08:44

## 2024-02-23 RX ADMIN — Medication 100 MCG: at 09:41

## 2024-02-23 RX ADMIN — Medication 100 MCG: at 10:05

## 2024-02-23 RX ADMIN — SCOPALAMINE 1 PATCH: 1 PATCH, EXTENDED RELEASE TRANSDERMAL at 08:44

## 2024-02-23 RX ADMIN — PROPOFOL INJECTABLE EMULSION 150 MG: 10 INJECTION, EMULSION INTRAVENOUS at 09:11

## 2024-02-23 RX ADMIN — DEXAMETHASONE SODIUM PHOSPHATE 4 MG: 4 INJECTION INTRA-ARTICULAR; INTRALESIONAL; INTRAMUSCULAR; INTRAVENOUS; SOFT TISSUE at 08:44

## 2024-02-23 RX ADMIN — Medication 100 MCG: at 09:54

## 2024-02-23 RX ADMIN — ONDANSETRON 4 MG: 2 INJECTION INTRAMUSCULAR; INTRAVENOUS at 10:00

## 2024-02-23 RX ADMIN — DEXAMETHASONE SODIUM PHOSPHATE 8 MG: 4 INJECTION, SOLUTION INTRA-ARTICULAR; INTRALESIONAL; INTRAMUSCULAR; INTRAVENOUS; SOFT TISSUE at 09:28

## 2024-02-23 RX ADMIN — SODIUM CHLORIDE, POTASSIUM CHLORIDE, SODIUM LACTATE AND CALCIUM CHLORIDE 1000 ML: 600; 310; 30; 20 INJECTION, SOLUTION INTRAVENOUS at 07:07

## 2024-02-23 RX ADMIN — Medication 100 MCG: at 09:44

## 2024-02-23 RX ADMIN — Medication 100 MCG: at 09:36

## 2024-02-23 RX ADMIN — LIDOCAINE HYDROCHLORIDE 100 MG: 20 INJECTION, SOLUTION EPIDURAL; INFILTRATION; INTRACAUDAL; PERINEURAL at 09:11

## 2024-02-23 RX ADMIN — GLYCOPYRROLATE 0.2 MG: 0.2 INJECTION INTRAMUSCULAR; INTRAVENOUS at 09:54

## 2024-02-23 RX ADMIN — Medication 120 MG: at 09:11

## 2024-02-23 RX ADMIN — SODIUM CHLORIDE, POTASSIUM CHLORIDE, SODIUM LACTATE AND CALCIUM CHLORIDE: 600; 310; 30; 20 INJECTION, SOLUTION INTRAVENOUS at 09:58

## 2024-02-23 NOTE — ANESTHESIA PREPROCEDURE EVALUATION
Anesthesia Evaluation     Patient summary reviewed   no history of anesthetic complications:   NPO Solid Status: > 8 hours             Airway   Mallampati: II  Dental      Pulmonary    (+) a smoker,  (-) COPD, asthma, sleep apnea  Cardiovascular   Exercise tolerance: excellent (>7 METS)    (-) pacemaker, past MI, angina, cardiac stents      Neuro/Psych  (+) headaches  (-) seizures, TIA, CVA  GI/Hepatic/Renal/Endo    (+) GERD  (-) liver disease, no renal disease, diabetes    Musculoskeletal     Abdominal    Substance History      OB/GYN    (-)  Pregnant        Other                    Anesthesia Plan    ASA 2     general     intravenous induction     Anesthetic plan, risks, benefits, and alternatives have been provided, discussed and informed consent has been obtained with: patient.    CODE STATUS:

## 2024-02-23 NOTE — ANESTHESIA PROCEDURE NOTES
Airway  Urgency: elective    Date/Time: 2/23/2024 9:12 AM  Airway not difficult    General Information and Staff    Patient location during procedure: OR  CRNA/CAA: Omid Gaming CRNA    Indications and Patient Condition  Indications for airway management: airway protection    Preoxygenated: yes  MILS maintained throughout  Mask difficulty assessment: 1 - vent by mask    Final Airway Details  Final airway type: endotracheal airway      Successful airway: ETT  Cuffed: yes   Successful intubation technique: direct laryngoscopy  Endotracheal tube insertion site: oral  Blade: Bennie  Blade size: 3.5  ETT size (mm): 7.5  Cormack-Lehane Classification: grade I - full view of glottis  Placement verified by: chest auscultation and capnometry   Cuff volume (mL): 5  Measured from: lips  ETT/EBT  to lips (cm): 22  Number of attempts at approach: 1  Assessment: lips, teeth, and gum same as pre-op and atraumatic intubation

## 2024-02-23 NOTE — ANESTHESIA POSTPROCEDURE EVALUATION
Patient: Gloria Melgoza    Procedure Summary       Date: 02/23/24 Room / Location:  [facility] OR 02 /  [facility]    Anesthesia Start: 0908 Anesthesia Stop: 1024    Procedure: Bilateral functional endoscopic anterior ethmoidectomy with bilateral middle meatal antrostomy, Septoplasty, and Bilateral inferior turbinate reduction via Coblation (Bilateral: Nose) Diagnosis:       Nasal septal deviation      Chronic rhinosinusitis      Sinus pressure      Nasal congestion      (Nasal septal deviation [J34.2])      (Chronic rhinosinusitis [J32.9])      (Sinus pressure [J34.89])      (Nasal congestion [R09.81])    Surgeons: Jadiel Smith MD Provider: Omid Gaming CRNA    Anesthesia Type: general ASA Status: 2            Anesthesia Type: general    Vitals  Vitals Value Taken Time   /77 02/23/24 1050   Temp 98 °F (36.7 °C) 02/23/24 1050   Pulse 87 02/23/24 1050   Resp 15 02/23/24 1050   SpO2 92 % 02/23/24 1050           Post Anesthesia Care and Evaluation    Patient location during evaluation: PACU  Patient participation: complete - patient participated  Level of consciousness: awake and awake and alert  Pain score: 0  Pain management: adequate    Airway patency: patent  Anesthetic complications: No anesthetic complications  PONV Status: none  Cardiovascular status: acceptable  Respiratory status: acceptable  Hydration status: acceptable    Comments: Patient discharged according to acceptable Mary score per RN assessment. See nursing records for further information.     Blood pressure 121/83, pulse 86, temperature 98 °F (36.7 °C), temperature source Temporal, resp. rate 16, SpO2 94%, not currently breastfeeding.

## 2024-02-23 NOTE — OP NOTE
"OPERATIVE NOTE  2/23/2024    NAME: Gloria Melgoza    YOB: 1980  MRN: [id number]    PRE-OPERATIVE DIAGNOSIS:    Nasal septal deviation [J34.2]  Chronic rhinosinusitis [J32.9]  Sinus pressure [J34.89]  Nasal congestion [R09.81]    POST-OPERATIVE DIAGNOSIS:   Post-Op Diagnosis Codes:     * Nasal septal deviation [J34.2]     * Chronic rhinosinusitis [J32.9]     * Sinus pressure [J34.89]     * Nasal congestion [R09.81]    PROCEDURE PERFORMED:   Bilateral functional endoscopic anterior ethmoidectomy with bilateral middle meatal antrostomy  Septoplasty  Bilateral inferior turbinate reduction via Coblation    SURGEON:   Jadiel Smith MD    ASSISTANT(S):   None    ANESTHESIA:   General Anesthesia via Endotracheal Tube    INDICATIONS: The patient is a 43 y.o. female with Nasal septal deviation [J34.2]  Chronic rhinosinusitis [J32.9]  Sinus pressure [J34.89]  Nasal congestion [R09.81]    PROCEDURE:  The patient was brought to the operating room, given General Anesthesia via Endotracheal Tube, and prepped and draped in the usual manner.     Approximately 4 mL of 4% cocaine solution impregnating Codman neurosurgical pledgets were placed intranasally and 5 minutes allowed to pass by the clock.  The pledgets were removed and a hemitransfixion incision accomplished 12 mm cephalad to the caudal margin of the quadrangular cartilage.  Septal flaps were elevated bilaterally consisting of the mucoperichondrium.  The deviated portion of quadrangular cartilage was resected with care taken to leave 10 mm of quadrangular cartilage for dorsal support.  The dissection progressed posteriorly to the perpendicular plate of the ethmoid bone and a cartilaginous spur was removed with Erika forceps and a Olympia elevator on the left.  Subsequently the caudal incision was closed with interrupted 4-0 plain gut and the ENTrigue surgical stapler utilized to place \"whip\" stitches.    Rigid nasal endoscopy was subsequently " performed with the Stortz 0° endoscope.  On the left the middle turbinate was subluxed medially utilizing a Norwood elevator and injection accomplished with 1 mL 1% Xylocaine with epinephrine.  The turbinate was transected from its lateral nasal wall attachments utilizing Bellucci scissors and further removed with Erika forceps and the ground lamella attachments resected utilizing Jere-Cut forceps.  No significant bleeding was encountered at the ground lamella and the little bleeding which was encountered was controlled with the Weck suction cautery.    An anterior ethmoidectomy was performed with the RAD-12microdebrider.  The uncinate process remnant was removed after the natural opening of the maxillary sinus was identified with the blunt ostium seeker.  The ethmoid bulla was infractured and unroofed utilizing the microdebrider.  No intranasal polyposis was appreciated.  The frontal recess appeared patent and so no dissection was performed in this area.  The dissection progressed posteriorly to the anterior portion of the posterior ethmoids.  No true polypoid changes were noted throughout the dissection.  Edematous mucosa was noted but no antral polyposis was appreciated    Inferior turbinate reduction was accomplished with the Coblator through a single penetration via the anterior portion of the inferior turbinate  intramurally or deep into the mucosa and 3 lesions were subsequently created with the Coblator as the Coblator wand was gently withdrawn following full insertion.  No significant bleeding was encountered.    A Regular Propel implant was placed near the antrostomy.   Stammberger sinus foam dressing was also placed.  No significant bleeding was noted.   Attention was then turned to the opposite side where a similar procedure was performed with similar findings.    The patient was transported upon extubation to the postanesthesia care unit in stable condition.    SPECIMENS:  A: Septal cartilage and bone  with ethmoid bone and mucosa    COMPLICATIONS: NONE    ESTIMATED BLOOD LOSS:  < 25 cc    Jadiel Smith MD  2/23/2024

## 2024-02-26 LAB
CYTO UR: NORMAL
LAB AP CASE REPORT: NORMAL
Lab: NORMAL
PATH REPORT.FINAL DX SPEC: NORMAL
PATH REPORT.GROSS SPEC: NORMAL

## 2024-03-11 NOTE — PROGRESS NOTES
YOB: 1980  Location: [facility]  Location Address: [address] [place] [address], [facility], [address] [place] [address]  Location Phone: [phone]    Chief Complaint   Patient presents with    Sinus Problem       History of Present Illness  Gloria Melgoza is a 43 y.o. female.  Gloria Melgoza is here for follow up of ENT complaints. The patient is s/p Bilateral functional endoscopic anterior ethmoidectomy with bilateral middle meatal antrostomy, Septoplasty, and Bilateral inferior turbinate reduction via Coblation on 24. Today, she admits nasal congestion. She has been using nasal ointment.     Past Medical History:   Diagnosis Date    Asthma     Increasing shortness of breath in revent months    Dental disease     Almost all top teeth are crowns    Dizziness     Always attributed to blood pressure    GERD (gastroesophageal reflux disease)     Headache     Hypertension     hx of    Insomnia     Muscle spasm of right shoulder     Nosebleed     Sinusitis     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       Past Surgical History:   Procedure Laterality Date    CARPAL TUNNEL RELEASE Bilateral      SECTION      COLONOSCOPY      ENDOMETRIAL ABLATION Bilateral 2018    tubal ligtion at same time    ENDOSCOPIC FUNCTIONAL SINUS SURGERY (FESS) Bilateral 2024    Procedure: Bilateral functional endoscopic anterior ethmoidectomy with bilateral middle meatal antrostomy, Septoplasty, and Bilateral inferior turbinate reduction via Coblation;  Surgeon: Jadiel Smith MD;  Location: [facility];  Service: ENT;  Laterality: Bilateral;    LAPAROSCOPIC TUBAL LIGATION         Outpatient Medications Marked as Taking for the 3/12/24 encounter (Office Visit) with Jadiel Smith MD   Medication Sig Dispense Refill    azelastine (ASTELIN) 0.1 % nasal spray Instill 1 spray into the nostril(s) as directed by provider 2 (Two) Times a Day. Use in each nostril as directed 30 mL 2    eszopiclone  (LUNESTA) 3 MG tablet Take 1 tablet by mouth every night at bedtime. Take immediately before bedtime 90 tablet 0    fluticasone (FLONASE) 50 MCG/ACT nasal spray 1 spray by Each Nare route Daily. 16 g 1    mupirocin (BACTROBAN) 2 % ointment Apply to the nose twice daily until follow up 22 g 0    mupirocin (BACTROBAN) 2 % ointment Apply to nose twice daily 22 g 0    nicotine (NICODERM CQ) 14 MG/24HR patch Place 1 patch on the skin as directed by provider Daily. 28 patch 1    nicotine (NICODERM CQ) 7 MG/24HR patch Place 1 patch on the skin as directed by provider Daily. 28 patch 1    propranolol LA (INDERAL LA) 80 MG 24 hr capsule Take 1 capsule by mouth Daily. 30 capsule 3    rOPINIRole (REQUIP) 1 MG tablet Take 1 tablet by mouth every night 1-3 hours before bedtime. Take 1 hour before bedtime. 90 tablet 3    tiZANidine (ZANAFLEX) 4 MG tablet Take 1 tablet by mouth at bedtime as needed for muscle spasms. 30 tablet 2    topiramate (TOPAMAX) 50 MG tablet Take 1 tablet by mouth twice daily. 60 tablet 6       Sulfa antibiotics    Family History   Problem Relation Age of Onset    Seizures Mother     Migraines Mother     Hypertension Father        Social History     Socioeconomic History    Marital status:    Tobacco Use    Smoking status: Former     Current packs/day: 0.50     Average packs/day: 0.5 packs/day for 20.0 years (10.0 ttl pk-yrs)     Types: Cigarettes    Smokeless tobacco: Never    Tobacco comments:     Down from 2 packs a day to 0.5 packs with patch    Vaping Use    Vaping status: Some Days   Substance and Sexual Activity    Alcohol use: Yes     Alcohol/week: 2.0 standard drinks of alcohol     Types: 2 Glasses of wine per week     Comment: occ    Drug use: Never    Sexual activity: Defer       Review of Systems   Constitutional: Negative.    HENT:  Positive for congestion.    Respiratory: Negative.         Vitals:    03/12/24 1422   BP: 129/65   Pulse: 102   Temp: 98.2 °F (36.8 °C)       Body mass index  is 28.17 kg/m².    Objective     Physical Exam  Vitals reviewed.   Constitutional:       Appearance: Normal appearance. She is normal weight.   HENT:      Head: Normocephalic.      Right Ear: External ear normal.      Left Ear: External ear normal.      Nose:      Comments: Bilateral nasal crusting   See endoscopy       Mouth/Throat:      Lips: Pink.      Mouth: Mucous membranes are moist.   Musculoskeletal:      Cervical back: Full passive range of motion without pain.   Neurological:      Mental Status: She is alert.   Psychiatric:         Behavior: Behavior is cooperative.         Assessment & Plan   Diagnoses and all orders for this visit:    1. S/P FESS (functional endoscopic sinus surgery) (Primary)    2. Nasal septal deviation    3. Chronic rhinosinusitis    4. Sinus pressure    5. Tobacco use    6. Nasal congestion      * Surgery not found *  No orders of the defined types were placed in this encounter.    Call return for problems  Continue Ocean Spray as needed  Continue Bactroban as prescribed for 2 weeks  Resume intranasal steroid spray and intranasal antihistamine spray  Follow-up in 3 to 4 weeks     Dr. Smith examined and discussed care with patient and agrees with treatment plan.     No follow-ups on file.       There are no Patient Instructions on file for this visit.

## 2024-03-12 ENCOUNTER — OFFICE VISIT (OUTPATIENT)
Dept: OTOLARYNGOLOGY | Facility: CLINIC | Age: 44
End: 2024-03-12
Payer: COMMERCIAL

## 2024-03-12 VITALS
WEIGHT: 157 LBS | HEART RATE: 102 BPM | HEIGHT: 63 IN | TEMPERATURE: 98.2 F | SYSTOLIC BLOOD PRESSURE: 129 MMHG | BODY MASS INDEX: 27.82 KG/M2 | DIASTOLIC BLOOD PRESSURE: 65 MMHG

## 2024-03-12 DIAGNOSIS — R09.81 NASAL CONGESTION: ICD-10-CM

## 2024-03-12 DIAGNOSIS — Z72.0 TOBACCO USE: ICD-10-CM

## 2024-03-12 DIAGNOSIS — J32.9 CHRONIC RHINOSINUSITIS: ICD-10-CM

## 2024-03-12 DIAGNOSIS — J34.89 SINUS PRESSURE: ICD-10-CM

## 2024-03-12 DIAGNOSIS — Z98.890 S/P FESS (FUNCTIONAL ENDOSCOPIC SINUS SURGERY): Primary | ICD-10-CM

## 2024-03-12 DIAGNOSIS — J34.2 NASAL SEPTAL DEVIATION: ICD-10-CM

## 2024-03-12 NOTE — PROGRESS NOTES
Procedure Note    Gloria Melgoza    DATE OF PROCEDURE: 3/12/2024    PROCEDURE:   Bilateral Rigid Nasal Endoscopy with debridement     PREPROCEDURE DIAGNOSIS:   Chronic rhinosinusitis S/P Bilateral functional endoscopic anterior ethmoidectomy with bilateral middle meatal antrostomy  Septoplasty  Bilateral inferior turbinate reduction via Coblation     POSTPROCEDURE DIAGNOSIS:  SAME     ANESTHESIA:   None       Procedure Details:    After topical anesthesia and decongestion, the patient was placed in the sitting position.  An endoscope was passed along the left nasal floor to the nasopharynx.  It was then passed into the region of the middle meatus, middle turbinate, and the sphenoethmoid region. An identical procedure was performed on the right side.  The following findings were noted as stated below:    Findings: Moderate crusted debris associated with mucoid drainage removed bilaterally with suction debridement and bayonet forceps.  Both propel stents were removed in combination with the crusted debris and mucoid drainage.  The antrostomies were patent and no evidence of bleeding was noted patient tolerated it well.    Condition:  Stable.  Patient tolerated procedure well.    Complications:  None

## 2024-04-18 ENCOUNTER — OFFICE VISIT (OUTPATIENT)
Dept: OTOLARYNGOLOGY | Facility: CLINIC | Age: 44
End: 2024-04-18
Payer: COMMERCIAL

## 2024-04-18 VITALS
BODY MASS INDEX: 27.46 KG/M2 | HEART RATE: 109 BPM | HEIGHT: 63 IN | DIASTOLIC BLOOD PRESSURE: 79 MMHG | SYSTOLIC BLOOD PRESSURE: 99 MMHG | TEMPERATURE: 97.5 F | WEIGHT: 155 LBS

## 2024-04-18 DIAGNOSIS — Z98.890 S/P FESS (FUNCTIONAL ENDOSCOPIC SINUS SURGERY): Primary | ICD-10-CM

## 2024-04-18 DIAGNOSIS — J32.9 CHRONIC RHINOSINUSITIS: ICD-10-CM

## 2024-04-18 DIAGNOSIS — J34.89 SINUS PRESSURE: ICD-10-CM

## 2024-04-18 DIAGNOSIS — R09.81 NASAL CONGESTION: ICD-10-CM

## 2024-04-18 DIAGNOSIS — J34.2 NASAL SEPTAL DEVIATION: ICD-10-CM

## 2024-04-18 NOTE — PROGRESS NOTES
YOB: 1980  Location: [facility]  Location Address: [address] [place] [address], [facility] [address], [address] [place] [address]  Location Phone: [phone]    Chief Complaint   Patient presents with   • 4 wk fu sinus     Complains of SOA with exertion and when swallowing she feels like something is in throat.        History of Present Illness  Gloria Melgoza is a 43 y.o. female.  Gloria Melgoza is here for follow up of ENT complaints. The patient is s/p Bilateral functional endoscopic anterior ethmoidectomy with bilateral middle meatal antrostomy, Septoplasty, and Bilateral inferior turbinate reduction via Coblation on 24. She admits nasal congestion to both sides. She has been using flonase and astelin daily and nasal ointment most days.       Past Medical History:   Diagnosis Date   • Asthma     Increasing shortness of breath in revent months   • Dental disease     Almost all top teeth are crowns   • Dizziness     Always attributed to blood pressure   • GERD (gastroesophageal reflux disease)    • Headache    • Hypertension     hx of   • Insomnia    • Muscle spasm of right shoulder    • Nosebleed    • Sinusitis     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       Past Surgical History:   Procedure Laterality Date   • CARPAL TUNNEL RELEASE Bilateral    •  SECTION     • COLONOSCOPY     • ENDOMETRIAL ABLATION Bilateral 2018    tubal ligtion at same time   • ENDOSCOPIC FUNCTIONAL SINUS SURGERY (FESS) Bilateral 2024    Procedure: Bilateral functional endoscopic anterior ethmoidectomy with bilateral middle meatal antrostomy, Septoplasty, and Bilateral inferior turbinate reduction via Coblation;  Surgeon: Jadiel Smith MD;  Location: [facility];  Service: ENT;  Laterality: Bilateral;   • LAPAROSCOPIC TUBAL LIGATION         No outpatient medications have been marked as taking for the 24 encounter (Office Visit) with Jadeil Smith MD.       Sulfa antibiotics    Family  History   Problem Relation Age of Onset   • Seizures Mother    • Migraines Mother    • Hypertension Father        Social History     Socioeconomic History   • Marital status:    Tobacco Use   • Smoking status: Former     Current packs/day: 0.50     Average packs/day: 0.5 packs/day for 20.0 years (10.0 ttl pk-yrs)     Types: Cigarettes   • Smokeless tobacco: Never   • Tobacco comments:     Down from 2 packs a day to 0.5 packs with patch    Vaping Use   • Vaping status: Some Days   Substance and Sexual Activity   • Alcohol use: Yes     Alcohol/week: 2.0 standard drinks of alcohol     Types: 2 Glasses of wine per week     Comment: occ   • Drug use: Never   • Sexual activity: Defer       Review of Systems   Constitutional: Negative.    HENT:  Positive for congestion.        Vitals:    04/18/24 1553   BP: 99/79   Pulse: 109   Temp: 97.5 °F (36.4 °C)       Body mass index is 27.81 kg/m².    Objective     Physical Exam  Vitals reviewed.   Constitutional:       Appearance: Normal appearance. She is normal weight.   HENT:      Head: Normocephalic.      Right Ear: External ear normal.      Left Ear: External ear normal.      Nose:      Comments: Bilateral nasal crusting   See endoscopy       Mouth/Throat:      Lips: Pink.      Mouth: Mucous membranes are moist.   Musculoskeletal:      Cervical back: Full passive range of motion without pain.   Neurological:      Mental Status: She is alert.   Psychiatric:         Behavior: Behavior is cooperative.       Assessment & Plan   Diagnoses and all orders for this visit:    1. S/P FESS (functional endoscopic sinus surgery) (Primary)    2. Nasal septal deviation    3. Chronic rhinosinusitis    4. Sinus pressure    5. Nasal congestion      * Surgery not found *  No orders of the defined types were placed in this encounter.    Call return for problems  Continue Ocean Spray as needed  Continue Bactroban as prescribed for 2 weeks  Resume intranasal steroid spray and intranasal  antihistamine spray  Follow-up in 3 to 4 weeks     Return in about 6 months (around 10/18/2024) for Recheck.       Patient Instructions   Call return for problems  Continue Ocean Spray as needed  Continue Bactroban as prescribed for 2 weeks  Resume intranasal steroid spray and intranasal antihistamine spray  Follow-up in 3 to 4 weeks

## 2024-04-18 NOTE — PROGRESS NOTES
Procedure Note    Gloria Melgoza    DATE OF PROCEDURE: 4/18/2024    PROCEDURE:   Bilateral Rigid Nasal Endoscopy with debridement     PREPROCEDURE DIAGNOSIS:   Chronic rhinosinusitis S/P Bilateral functional endoscopic anterior ethmoidectomy with bilateral middle meatal antrostomy  Septoplasty  Bilateral inferior turbinate reduction via Coblation     POSTPROCEDURE DIAGNOSIS:  SAME     ANESTHESIA:   None       Procedure Details:    After topical anesthesia and decongestion, the patient was placed in the sitting position.  An endoscope was passed along the left nasal floor to the nasopharynx.  It was then passed into the region of the middle meatus, middle turbinate, and the sphenoethmoid region. An identical procedure was performed on the right side.  The following findings were noted as stated below:    Findings: Moderate amount of crusting on the right to the ground lamella removed without difficulty utilizing wheel Blakesley Forceps.  Minimal Suction Debridement on the Left with No Abnormalities Otherwise Appreciated.  Both Antrostomies Were Patent.  Mild to Moderate Pale Boggy Turbinates Are Appreciated Bilaterally.    Condition:  Stable.  Patient tolerated procedure well.    Complications:  None

## 2024-07-24 ENCOUNTER — TRANSCRIBE ORDERS (OUTPATIENT)
Dept: ADMINISTRATIVE | Facility: HOSPITAL | Age: 44
End: 2024-07-24
Payer: COMMERCIAL

## 2024-07-24 DIAGNOSIS — Z12.31 ENCOUNTER FOR SCREENING MAMMOGRAM FOR MALIGNANT NEOPLASM OF BREAST: Primary | ICD-10-CM

## 2024-07-29 LAB
NCCN CRITERIA FLAG: NORMAL
TYRER CUZICK SCORE: 6.7

## 2024-07-30 ENCOUNTER — LAB (OUTPATIENT)
Dept: LAB | Facility: HOSPITAL | Age: 44
End: 2024-07-30
Payer: COMMERCIAL

## 2024-07-30 ENCOUNTER — TRANSCRIBE ORDERS (OUTPATIENT)
Dept: ADMINISTRATIVE | Facility: HOSPITAL | Age: 44
End: 2024-07-30
Payer: COMMERCIAL

## 2024-07-30 DIAGNOSIS — F51.01 PRIMARY INSOMNIA: ICD-10-CM

## 2024-07-30 DIAGNOSIS — N95.1 SYMPTOMATIC MENOPAUSAL OR FEMALE CLIMACTERIC STATES: Primary | ICD-10-CM

## 2024-07-30 DIAGNOSIS — M62.838 SPASM OF MUSCLE: ICD-10-CM

## 2024-07-30 DIAGNOSIS — Z00.00 ROUTINE GENERAL MEDICAL EXAMINATION AT A HEALTH CARE FACILITY: ICD-10-CM

## 2024-07-30 DIAGNOSIS — G25.81 RESTLESS LEGS: ICD-10-CM

## 2024-07-30 DIAGNOSIS — N95.1 SYMPTOMATIC MENOPAUSAL OR FEMALE CLIMACTERIC STATES: ICD-10-CM

## 2024-07-30 DIAGNOSIS — J32.4 CHRONIC PANSINUSITIS: ICD-10-CM

## 2024-07-30 LAB
25(OH)D3 SERPL-MCNC: 15.7 NG/ML (ref 30–100)
ALBUMIN SERPL-MCNC: 4 G/DL (ref 3.5–5.2)
ALBUMIN/GLOB SERPL: 1.4 G/DL
ALP SERPL-CCNC: 92 U/L (ref 39–117)
ALT SERPL W P-5'-P-CCNC: 16 U/L (ref 1–33)
ANION GAP SERPL CALCULATED.3IONS-SCNC: 12 MMOL/L (ref 5–15)
AST SERPL-CCNC: 13 U/L (ref 1–32)
BILIRUB SERPL-MCNC: 0.3 MG/DL (ref 0–1.2)
BUN SERPL-MCNC: 16 MG/DL (ref 6–20)
BUN/CREAT SERPL: 22.2 (ref 7–25)
CALCIUM SPEC-SCNC: 8.7 MG/DL (ref 8.6–10.5)
CHLORIDE SERPL-SCNC: 108 MMOL/L (ref 98–107)
CHOLEST SERPL-MCNC: 209 MG/DL (ref 0–200)
CO2 SERPL-SCNC: 18 MMOL/L (ref 22–29)
CREAT SERPL-MCNC: 0.72 MG/DL (ref 0.57–1)
DEPRECATED RDW RBC AUTO: 43.9 FL (ref 37–54)
EGFRCR SERPLBLD CKD-EPI 2021: 106.5 ML/MIN/1.73
ERYTHROCYTE [DISTWIDTH] IN BLOOD BY AUTOMATED COUNT: 13.4 % (ref 12.3–15.4)
GLOBULIN UR ELPH-MCNC: 2.8 GM/DL
GLUCOSE SERPL-MCNC: 107 MG/DL (ref 65–99)
HCT VFR BLD AUTO: 42.3 % (ref 34–46.6)
HDLC SERPL-MCNC: 34 MG/DL (ref 40–60)
HGB BLD-MCNC: 14 G/DL (ref 12–15.9)
LDLC SERPL CALC-MCNC: 119 MG/DL (ref 0–100)
LDLC/HDLC SERPL: 3.26 {RATIO}
MCH RBC QN AUTO: 30 PG (ref 26.6–33)
MCHC RBC AUTO-ENTMCNC: 33.1 G/DL (ref 31.5–35.7)
MCV RBC AUTO: 90.8 FL (ref 79–97)
PLATELET # BLD AUTO: 211 10*3/MM3 (ref 140–450)
PMV BLD AUTO: 10.6 FL (ref 6–12)
POTASSIUM SERPL-SCNC: 3.9 MMOL/L (ref 3.5–5.2)
PROT SERPL-MCNC: 6.8 G/DL (ref 6–8.5)
RBC # BLD AUTO: 4.66 10*6/MM3 (ref 3.77–5.28)
SODIUM SERPL-SCNC: 138 MMOL/L (ref 136–145)
T3FREE SERPL-MCNC: 2.63 PG/ML (ref 2–4.4)
T4 FREE SERPL-MCNC: 1.3 NG/DL (ref 0.93–1.7)
TRIGL SERPL-MCNC: 321 MG/DL (ref 0–150)
TSH SERPL DL<=0.05 MIU/L-ACNC: 2.73 UIU/ML (ref 0.27–4.2)
VLDLC SERPL-MCNC: 56 MG/DL (ref 5–40)
WBC NRBC COR # BLD AUTO: 6.13 10*3/MM3 (ref 3.4–10.8)

## 2024-07-30 PROCEDURE — 80050 GENERAL HEALTH PANEL: CPT

## 2024-07-30 PROCEDURE — 36415 COLL VENOUS BLD VENIPUNCTURE: CPT

## 2024-07-30 PROCEDURE — 84481 FREE ASSAY (FT-3): CPT

## 2024-07-30 PROCEDURE — 82306 VITAMIN D 25 HYDROXY: CPT

## 2024-07-30 PROCEDURE — 80061 LIPID PANEL: CPT

## 2024-07-30 PROCEDURE — 84439 ASSAY OF FREE THYROXINE: CPT

## 2024-08-01 ENCOUNTER — TRANSCRIBE ORDERS (OUTPATIENT)
Dept: ADMINISTRATIVE | Facility: HOSPITAL | Age: 44
End: 2024-08-01
Payer: COMMERCIAL

## 2024-08-01 ENCOUNTER — HOSPITAL ENCOUNTER (OUTPATIENT)
Dept: MAMMOGRAPHY | Facility: HOSPITAL | Age: 44
Discharge: HOME | End: 2024-08-01
Admitting: PHYSICIAN ASSISTANT
Payer: COMMERCIAL

## 2024-08-01 DIAGNOSIS — R73.09 INCREASED GLUCOSE LEVEL: Primary | ICD-10-CM

## 2024-08-01 DIAGNOSIS — Z12.31 ENCOUNTER FOR SCREENING MAMMOGRAM FOR MALIGNANT NEOPLASM OF BREAST: ICD-10-CM

## 2024-08-01 PROCEDURE — 77067 SCR MAMMO BI INCL CAD: CPT

## 2024-08-01 PROCEDURE — 77063 BREAST TOMOSYNTHESIS BI: CPT

## 2024-08-05 ENCOUNTER — LAB (OUTPATIENT)
Dept: LAB | Facility: HOSPITAL | Age: 44
End: 2024-08-05
Payer: COMMERCIAL

## 2024-08-05 DIAGNOSIS — R73.09 INCREASED GLUCOSE LEVEL: ICD-10-CM

## 2024-08-05 LAB — HBA1C MFR BLD: 5.5 % (ref 4.8–5.6)

## 2024-08-05 PROCEDURE — 36415 COLL VENOUS BLD VENIPUNCTURE: CPT

## 2024-08-05 PROCEDURE — 83036 HEMOGLOBIN GLYCOSYLATED A1C: CPT

## 2024-08-07 ENCOUNTER — HOSPITAL ENCOUNTER (OUTPATIENT)
Dept: MAMMOGRAPHY | Facility: HOSPITAL | Age: 44
Discharge: HOME | End: 2024-08-07
Payer: COMMERCIAL

## 2024-08-07 ENCOUNTER — HOSPITAL ENCOUNTER (OUTPATIENT)
Dept: ULTRASOUND IMAGING | Facility: HOSPITAL | Age: 44
Discharge: HOME | End: 2024-08-07
Payer: COMMERCIAL

## 2024-08-07 DIAGNOSIS — R92.8 ABNORMAL MAMMOGRAM: ICD-10-CM

## 2024-08-07 PROCEDURE — 76642 ULTRASOUND BREAST LIMITED: CPT

## 2024-08-07 PROCEDURE — G0279 TOMOSYNTHESIS, MAMMO: HCPCS

## 2024-08-07 PROCEDURE — 77065 DX MAMMO INCL CAD UNI: CPT

## 2024-08-08 ENCOUNTER — TRANSCRIBE ORDERS (OUTPATIENT)
Dept: ADMINISTRATIVE | Facility: HOSPITAL | Age: 44
End: 2024-08-08
Payer: COMMERCIAL

## 2024-08-08 DIAGNOSIS — R92.8 ABNORMAL MAMMOGRAM: Primary | ICD-10-CM

## 2024-08-12 ENCOUNTER — HOSPITAL ENCOUNTER (OUTPATIENT)
Dept: MAMMOGRAPHY | Facility: HOSPITAL | Age: 44
Discharge: HOME | End: 2024-08-12
Payer: COMMERCIAL

## 2024-08-12 DIAGNOSIS — R92.8 ABNORMAL MAMMOGRAM: ICD-10-CM

## 2024-08-12 PROCEDURE — A4648 IMPLANTABLE TISSUE MARKER: HCPCS

## 2024-08-12 PROCEDURE — 88305 TISSUE EXAM BY PATHOLOGIST: CPT | Performed by: PHYSICIAN ASSISTANT

## 2024-08-12 PROCEDURE — 88321 CONSLTJ&REPRT SLD PREP ELSWR: CPT | Performed by: PHYSICIAN ASSISTANT

## 2024-08-12 RX ORDER — LIDOCAINE HYDROCHLORIDE AND EPINEPHRINE 10; 10 MG/ML; UG/ML
10 INJECTION, SOLUTION INFILTRATION; PERINEURAL ONCE
Status: SHIPPED | OUTPATIENT
Start: 2024-08-12

## 2024-08-12 RX ORDER — LIDOCAINE HYDROCHLORIDE 10 MG/ML
10 INJECTION, SOLUTION INFILTRATION; PERINEURAL ONCE
Status: SHIPPED | OUTPATIENT
Start: 2024-08-12

## 2024-08-15 LAB
CYTO UR: NORMAL
LAB AP CASE REPORT: NORMAL
LAB AP CLINICAL INFORMATION: NORMAL
LAB AP DIAGNOSIS COMMENT: NORMAL
Lab: NORMAL
PATH REPORT.FINAL DX SPEC: NORMAL
PATH REPORT.GROSS SPEC: NORMAL

## 2024-09-16 ENCOUNTER — OFFICE VISIT (OUTPATIENT)
Dept: SURGERY | Facility: CLINIC | Age: 44
End: 2024-09-16
Payer: COMMERCIAL

## 2024-09-16 VITALS
OXYGEN SATURATION: 97 % | DIASTOLIC BLOOD PRESSURE: 88 MMHG | SYSTOLIC BLOOD PRESSURE: 144 MMHG | WEIGHT: 155 LBS | HEIGHT: 62 IN | BODY MASS INDEX: 28.52 KG/M2 | HEART RATE: 63 BPM

## 2024-09-16 DIAGNOSIS — N60.99 ATYPICAL DUCTAL HYPERPLASIA OF BREAST: Primary | ICD-10-CM

## 2024-09-16 DIAGNOSIS — E66.3 OVERWEIGHT WITH BODY MASS INDEX (BMI) OF 28 TO 28.9 IN ADULT: ICD-10-CM

## 2024-09-17 ENCOUNTER — HOSPITAL ENCOUNTER (OUTPATIENT)
Dept: MRI IMAGING | Facility: HOSPITAL | Age: 44
Discharge: HOME | End: 2024-09-17
Admitting: STUDENT IN AN ORGANIZED HEALTH CARE EDUCATION/TRAINING PROGRAM
Payer: COMMERCIAL

## 2024-09-17 DIAGNOSIS — N60.99 ATYPICAL DUCTAL HYPERPLASIA OF BREAST: ICD-10-CM

## 2024-09-17 PROCEDURE — 25510000001 GADOPICLENOL 0.5 MMOL/ML SOLUTION: Performed by: STUDENT IN AN ORGANIZED HEALTH CARE EDUCATION/TRAINING PROGRAM

## 2024-09-17 PROCEDURE — A9573 GADOPICLENOL 0.5 MMOL/ML SOLUTION: HCPCS | Performed by: STUDENT IN AN ORGANIZED HEALTH CARE EDUCATION/TRAINING PROGRAM

## 2024-09-17 PROCEDURE — 77049 MRI BREAST C-+ W/CAD BI: CPT

## 2024-09-17 RX ADMIN — GADOPICLENOL 7 ML: 485.1 INJECTION INTRAVENOUS at 13:08

## 2024-09-19 ENCOUNTER — PREP FOR SURGERY (OUTPATIENT)
Dept: OTHER | Facility: HOSPITAL | Age: 44
End: 2024-09-19
Payer: COMMERCIAL

## 2024-09-19 DIAGNOSIS — N60.99 ATYPICAL DUCTAL HYPERPLASIA OF BREAST: Primary | ICD-10-CM

## 2024-09-19 RX ORDER — HEPARIN SODIUM 5000 [USP'U]/ML
5000 INJECTION, SOLUTION INTRAVENOUS; SUBCUTANEOUS EVERY 8 HOURS SCHEDULED
OUTPATIENT
Start: 2024-09-19

## 2024-09-20 PROBLEM — N60.99 ATYPICAL DUCTAL HYPERPLASIA OF BREAST: Status: ACTIVE | Noted: 2024-09-19

## 2024-09-23 DIAGNOSIS — N60.99 ATYPICAL DUCTAL HYPERPLASIA OF BREAST: Primary | ICD-10-CM

## 2024-10-11 ENCOUNTER — HOSPITAL ENCOUNTER (OUTPATIENT)
Dept: MAMMOGRAPHY | Facility: HOSPITAL | Age: 44
Discharge: HOME | End: 2024-10-11
Payer: COMMERCIAL

## 2024-10-11 ENCOUNTER — HOSPITAL ENCOUNTER (OUTPATIENT)
Dept: GENERAL RADIOLOGY | Facility: HOSPITAL | Age: 44
Discharge: HOME | End: 2024-10-11
Payer: COMMERCIAL

## 2024-10-11 ENCOUNTER — PRE-ADMISSION TESTING (OUTPATIENT)
Dept: PREADMISSION TESTING | Facility: HOSPITAL | Age: 44
End: 2024-10-11
Payer: COMMERCIAL

## 2024-10-11 VITALS
DIASTOLIC BLOOD PRESSURE: 94 MMHG | RESPIRATION RATE: 16 BRPM | HEART RATE: 59 BPM | HEIGHT: 63 IN | SYSTOLIC BLOOD PRESSURE: 126 MMHG | BODY MASS INDEX: 27.23 KG/M2 | WEIGHT: 153.66 LBS | OXYGEN SATURATION: 98 %

## 2024-10-11 DIAGNOSIS — N60.99 ATYPICAL DUCTAL HYPERPLASIA OF BREAST: ICD-10-CM

## 2024-10-11 LAB
ALBUMIN SERPL-MCNC: 4.1 G/DL (ref 3.5–5.2)
ALBUMIN/GLOB SERPL: 1.6 G/DL
ALP SERPL-CCNC: 93 U/L (ref 39–117)
ALT SERPL W P-5'-P-CCNC: 21 U/L (ref 1–33)
ANION GAP SERPL CALCULATED.3IONS-SCNC: 9 MMOL/L (ref 5–15)
AST SERPL-CCNC: 14 U/L (ref 1–32)
BASOPHILS # BLD AUTO: 0.04 10*3/MM3 (ref 0–0.2)
BASOPHILS NFR BLD AUTO: 0.8 % (ref 0–1.5)
BILIRUB SERPL-MCNC: 0.3 MG/DL (ref 0–1.2)
BUN SERPL-MCNC: 13 MG/DL (ref 6–20)
BUN/CREAT SERPL: 19.4 (ref 7–25)
CALCIUM SPEC-SCNC: 9.1 MG/DL (ref 8.6–10.5)
CHLORIDE SERPL-SCNC: 109 MMOL/L (ref 98–107)
CO2 SERPL-SCNC: 22 MMOL/L (ref 22–29)
CREAT SERPL-MCNC: 0.67 MG/DL (ref 0.57–1)
DEPRECATED RDW RBC AUTO: 45.2 FL (ref 37–54)
EGFRCR SERPLBLD CKD-EPI 2021: 110.7 ML/MIN/1.73
EOSINOPHIL # BLD AUTO: 0.11 10*3/MM3 (ref 0–0.4)
EOSINOPHIL NFR BLD AUTO: 2.1 % (ref 0.3–6.2)
ERYTHROCYTE [DISTWIDTH] IN BLOOD BY AUTOMATED COUNT: 13.2 % (ref 12.3–15.4)
GLOBULIN UR ELPH-MCNC: 2.6 GM/DL
GLUCOSE SERPL-MCNC: 96 MG/DL (ref 65–99)
HCT VFR BLD AUTO: 43.4 % (ref 34–46.6)
HGB BLD-MCNC: 13.9 G/DL (ref 12–15.9)
IMM GRANULOCYTES # BLD AUTO: 0.03 10*3/MM3 (ref 0–0.05)
IMM GRANULOCYTES NFR BLD AUTO: 0.6 % (ref 0–0.5)
LYMPHOCYTES # BLD AUTO: 1.7 10*3/MM3 (ref 0.7–3.1)
LYMPHOCYTES NFR BLD AUTO: 31.9 % (ref 19.6–45.3)
MCH RBC QN AUTO: 29.9 PG (ref 26.6–33)
MCHC RBC AUTO-ENTMCNC: 32 G/DL (ref 31.5–35.7)
MCV RBC AUTO: 93.3 FL (ref 79–97)
MONOCYTES # BLD AUTO: 0.64 10*3/MM3 (ref 0.1–0.9)
MONOCYTES NFR BLD AUTO: 12 % (ref 5–12)
NEUTROPHILS NFR BLD AUTO: 2.81 10*3/MM3 (ref 1.7–7)
NEUTROPHILS NFR BLD AUTO: 52.6 % (ref 42.7–76)
NRBC BLD AUTO-RTO: 0 /100 WBC (ref 0–0.2)
PLATELET # BLD AUTO: 231 10*3/MM3 (ref 140–450)
PMV BLD AUTO: 11.5 FL (ref 6–12)
POTASSIUM SERPL-SCNC: 4 MMOL/L (ref 3.5–5.2)
PROT SERPL-MCNC: 6.7 G/DL (ref 6–8.5)
RBC # BLD AUTO: 4.65 10*6/MM3 (ref 3.77–5.28)
SODIUM SERPL-SCNC: 140 MMOL/L (ref 136–145)
WBC NRBC COR # BLD AUTO: 5.33 10*3/MM3 (ref 3.4–10.8)

## 2024-10-11 PROCEDURE — 85025 COMPLETE CBC W/AUTO DIFF WBC: CPT

## 2024-10-11 PROCEDURE — 71045 X-RAY EXAM CHEST 1 VIEW: CPT

## 2024-10-11 PROCEDURE — 93010 ELECTROCARDIOGRAM REPORT: CPT | Performed by: EMERGENCY MEDICINE

## 2024-10-11 PROCEDURE — 80053 COMPREHEN METABOLIC PANEL: CPT

## 2024-10-11 PROCEDURE — 36415 COLL VENOUS BLD VENIPUNCTURE: CPT

## 2024-10-11 PROCEDURE — A4648 IMPLANTABLE TISSUE MARKER: HCPCS

## 2024-10-11 PROCEDURE — 93005 ELECTROCARDIOGRAM TRACING: CPT

## 2024-10-11 RX ORDER — LIDOCAINE HYDROCHLORIDE 10 MG/ML
10 INJECTION, SOLUTION INFILTRATION; PERINEURAL ONCE
Status: ACTIVE | OUTPATIENT
Start: 2024-10-11

## 2024-10-11 NOTE — DISCHARGE INSTRUCTIONS
Preparing for Surgery  Follow these instructions before the procedure:  Several days or weeks before your procedure      Ask your health care provider about:  Changing or stopping your regular medicines. This is especially important if you are taking diabetes medicines or blood thinners.  Taking medicines such as aspirin and ibuprofen. These medicines can thin your blood. Do not take these medicines unless your health care provider tells you to take them.  Taking over-the-counter medicines, vitamins, herbs, and supplements.    Contact your surgeon if you:  Develop a fever of more than 100.4°F (38°C) or other feelings of illness during the 48 hours before your surgery.  Have symptoms that get worse.  Have questions or concerns about your surgery.  If you are going home the same day of your surgery you will need to arrange for a responsible adult, age 18 years old or older, to drive you home from the hospital and stay with you for 24 hours. Verification of the  will be made prior to any procedure requiring sedation. You may not go home in a taxi or any form of public transportation by yourself.     Day before your procedure    24 hours before your procedure DO NOT drink alcoholic beverages or smoke.  24 hours before your procedure STOP taking Erectile Dysfunction medication (i.e.,Cialis, Viagra)   You may be asked to shower with a germ-killing soap.  Day of your procedure   You may take the following medication(s) the morning of surgery with a sip of water: AS DIRECTED BY YOUR DOCTOR      8 hours before your scheduled arrival time, STOP all food, any dairy products, and full liquids. This includes hard candy, chewing gum or mints. This is extremely important to prevent serious complications.     Up to 2 hours before your scheduled arrival time, you may have clear liquids no cream, powder, or pulp of any kind. Safe options are water, black coffee, plain tea, soda, Gatorade/Powerade, clear broth, apple juice.    2  hours before your scheduled arrival time, STOP drinking clear liquids.    You may need to take another shower with a germ-killing soap before you leave home in the morning. Do not use perfumes, colognes, or body lotions.  Wear comfortable loose-fitting clothing.  Remove all jewelry including body piercing and rings, dark colored nail polish, and make up prior to arrival at the hospital. Leave all valuables at home.   Bring your hearing aids if you rely on them.  Do not wear contact lenses. If you wear eyeglasses remember to bring a case to store them in while you are in surgery.  Do not use denture adhesives since you will be asked to remove them during your surgery.    You do not need to bring your home medications into the hospital.   Bring your sleep apnea device with you on the day of your surgery (if this applies to you).  If you wear portable oxygen, bring it with you.   If you are staying overnight, you may bring a bag of items you may need such as slippers, robe and a change of clothes for your discharge. You may want to leave these items in the car until you are ready for them since your family will take your belongings when you leave the pre-operative area.  Arrive at the hospital as scheduled by the office. You will be asked to arrive 2 hours prior to your surgery time in order to prepare for your procedure.  When you arrive at the hospital  Go to the registration desk located at the main entrance of the hospital.  After registration is completed, you will be given a beeper and a sticker sheet. Take the stickers to Outpatient Surgery and place in the tray at the end of the desk to notify the staff that you have arrived and registered.   Return to the lobby to wait. You are not always called back according to the time of arrival but rather the time your doctor will be ready.  When your beeper lights up and vibrates proceed through the double doors, under the stairs, and a member of the Outpatient Surgery  staff will escort you to your preoperative room.     How to Use Chlorhexidine Before Surgery  Chlorhexidine gluconate (CHG) is a germ-killing (antiseptic) solution that is used to clean the skin. It can get rid of the bacteria that normally live on the skin and can keep them away for about 24 hours. To clean your skin with CHG, you may be given:  A CHG solution to use in the shower or as part of a sponge bath.  A prepackaged cloth that contains CHG.  Cleaning your skin with CHG may help lower the risk for infection:  While you are staying in the intensive care unit of the hospital.  If you have a vascular access, such as a central line, to provide short-term or long-term access to your veins.  If you have a catheter to drain urine from your bladder.  If you are on a ventilator. A ventilator is a machine that helps you breathe by moving air in and out of your lungs.  After surgery.  What are the risks?  Risks of using CHG include:  A skin reaction.  Hearing loss, if CHG gets in your ears and you have a perforated eardrum.  Eye injury, if CHG gets in your eyes and is not rinsed out.  The CHG product catching fire.  Make sure that you avoid smoking and flames after applying CHG to your skin.  Do not use CHG:  If you have a chlorhexidine allergy or have previously reacted to chlorhexidine.  On babies younger than 2 months of age.  How to use CHG solution  Use CHG only as told by your health care provider, and follow the instructions on the label.  Use the full amount of CHG as directed. Usually, this is one bottle.  During a shower    Follow these steps when using CHG solution during a shower (unless your health care provider gives you different instructions):  Start the shower.  Use your normal soap and shampoo to wash your face and hair.  Turn off the shower or move out of the shower stream.  Pour the CHG onto a clean washcloth. Do not use any type of brush or rough-edged sponge.  Starting at your neck, lather your  body down to your toes. Make sure you follow these instructions:  If you will be having surgery, pay special attention to the part of your body where you will be having surgery. Scrub this area for at least 1 minute.  Do not use CHG on your head or face. If the solution gets into your ears or eyes, rinse them well with water.  Avoid your genital area.  Avoid any areas of skin that have broken skin, cuts, or scrapes.  Scrub your back and under your arms. Make sure to wash skin folds.  Let the lather sit on your skin for 1-2 minutes or as long as told by your health care provider.  Thoroughly rinse your entire body in the shower. Make sure that all body creases and crevices are rinsed well.  Dry off with a clean towel. Do not put any substances on your body afterward--such as powder, lotion, or perfume--unless you are told to do so by your health care provider. Only use lotions that are recommended by the .  Put on clean clothes or pajamas.  If it is the night before your surgery, sleep in clean sheets.     During a sponge bath  Follow these steps when using CHG solution during a sponge bath (unless your health care provider gives you different instructions):  Use your normal soap and shampoo to wash your face and hair.  Pour the CHG onto a clean washcloth.  Starting at your neck, lather your body down to your toes. Make sure you follow these instructions:  If you will be having surgery, pay special attention to the part of your body where you will be having surgery. Scrub this area for at least 1 minute.  Do not use CHG on your head or face. If the solution gets into your ears or eyes, rinse them well with water.  Avoid your genital area.  Avoid any areas of skin that have broken skin, cuts, or scrapes.  Scrub your back and under your arms. Make sure to wash skin folds.  Let the lather sit on your skin for 1-2 minutes or as long as told by your health care provider.  Using a different clean, wet  washcloth, thoroughly rinse your entire body. Make sure that all body creases and crevices are rinsed well.  Dry off with a clean towel. Do not put any substances on your body afterward--such as powder, lotion, or perfume--unless you are told to do so by your health care provider. Only use lotions that are recommended by the .  Put on clean clothes or pajamas.  If it is the night before your surgery, sleep in clean sheets.  How to use CHG prepackaged cloths  Only use CHG cloths as told by your health care provider, and follow the instructions on the label.  Use the CHG cloth on clean, dry skin.  Do not use the CHG cloth on your head or face unless your health care provider tells you to.  When washing with the CHG cloth:  Avoid your genital area.  Avoid any areas of skin that have broken skin, cuts, or scrapes.  Before surgery    Follow these steps when using a CHG cloth to clean before surgery (unless your health care provider gives you different instructions):  Using the CHG cloth, vigorously scrub the part of your body where you will be having surgery. Scrub using a back-and-forth motion for 3 minutes. The area on your body should be completely wet with CHG when you are done scrubbing.  Do not rinse. Discard the cloth and let the area air-dry. Do not put any substances on the area afterward, such as powder, lotion, or perfume.  Put on clean clothes or pajamas.  If it is the night before your surgery, sleep in clean sheets.     For general bathing  Follow these steps when using CHG cloths for general bathing (unless your health care provider gives you different instructions).  Use a separate CHG cloth for each area of your body. Make sure you wash between any folds of skin and between your fingers and toes. Wash your body in the following order, switching to a new cloth after each step:  The front of your neck, shoulders, and chest.  Both of your arms, under your arms, and your hands.  Your stomach and  groin area, avoiding the genitals.  Your right leg and foot.  Your left leg and foot.  The back of your neck, your back, and your buttocks.  Do not rinse. Discard the cloth and let the area air-dry. Do not put any substances on your body afterward--such as powder, lotion, or perfume--unless you are told to do so by your health care provider. Only use lotions that are recommended by the .  Put on clean clothes or pajamas.  Contact a health care provider if:  Your skin gets irritated after scrubbing.  You have questions about using your solution or cloth.  You swallow any chlorhexidine. Call your local poison control center (1-622.159.5119 in the [place]  Get help right away if:  Your eyes itch badly, or they become very red or swollen.  Your skin itches badly and is red or swollen.  Your hearing changes.  You have trouble seeing.  You have swelling or tingling in your mouth or throat.  You have trouble breathing.  These symptoms may represent a serious problem that is an emergency. Do not wait to see if the symptoms will go away. Get medical help right away. Call your local emergency services (481 in the [place] Do not drive yourself to the hospital.  Summary  Chlorhexidine gluconate (CHG) is a germ-killing (antiseptic) solution that is used to clean the skin. Cleaning your skin with CHG may help to lower your risk for infection.  You may be given CHG to use for bathing. It may be in a bottle or in a prepackaged cloth to use on your skin. Carefully follow your health care provider's instructions and the instructions on the product label.  Do not use CHG if you have a chlorhexidine allergy.  Contact your health care provider if your skin gets irritated after scrubbing.  This information is not intended to replace advice given to you by your health care provider. Make sure you discuss any questions you have with your health care provider.  Document Revised: 04/17/2023 Document Reviewed: 02/28/2022  [employer]  Patient Education © 2023 [employer]

## 2024-10-12 LAB
QT INTERVAL: 448 MS
QTC INTERVAL: 420 MS

## 2024-10-17 ENCOUNTER — TELEPHONE (OUTPATIENT)
Dept: SURGERY | Facility: CLINIC | Age: 44
End: 2024-10-17
Payer: COMMERCIAL

## 2024-10-17 NOTE — TELEPHONE ENCOUNTER
I called patient to remind her of her arrival time of 7:30 tomorrow morning and to make sure nothing to eat or drink after midnight, patient voiced understandings.

## 2024-10-18 ENCOUNTER — ANESTHESIA EVENT (OUTPATIENT)
Dept: PERIOP | Facility: HOSPITAL | Age: 44
End: 2024-10-18
Payer: COMMERCIAL

## 2024-10-18 ENCOUNTER — ANESTHESIA (OUTPATIENT)
Dept: PERIOP | Facility: HOSPITAL | Age: 44
End: 2024-10-18
Payer: COMMERCIAL

## 2024-10-18 ENCOUNTER — HOSPITAL ENCOUNTER (OUTPATIENT)
Facility: HOSPITAL | Age: 44
Setting detail: HOSPITAL OUTPATIENT SURGERY
Discharge: HOME | End: 2024-10-18
Attending: STUDENT IN AN ORGANIZED HEALTH CARE EDUCATION/TRAINING PROGRAM | Admitting: STUDENT IN AN ORGANIZED HEALTH CARE EDUCATION/TRAINING PROGRAM
Payer: COMMERCIAL

## 2024-10-18 ENCOUNTER — HOSPITAL ENCOUNTER (OUTPATIENT)
Dept: GENERAL RADIOLOGY | Facility: HOSPITAL | Age: 44
Discharge: HOME | End: 2024-10-18
Payer: COMMERCIAL

## 2024-10-18 VITALS
SYSTOLIC BLOOD PRESSURE: 109 MMHG | HEART RATE: 71 BPM | RESPIRATION RATE: 16 BRPM | OXYGEN SATURATION: 93 % | DIASTOLIC BLOOD PRESSURE: 76 MMHG | TEMPERATURE: 97.2 F

## 2024-10-18 DIAGNOSIS — N60.99 ATYPICAL DUCTAL HYPERPLASIA OF BREAST: ICD-10-CM

## 2024-10-18 LAB — B-HCG UR QL: NEGATIVE

## 2024-10-18 PROCEDURE — 19126 EXCISION ADDL BREAST LESION: CPT | Performed by: STUDENT IN AN ORGANIZED HEALTH CARE EDUCATION/TRAINING PROGRAM

## 2024-10-18 PROCEDURE — 81025 URINE PREGNANCY TEST: CPT | Performed by: STUDENT IN AN ORGANIZED HEALTH CARE EDUCATION/TRAINING PROGRAM

## 2024-10-18 PROCEDURE — 25010000002 FENTANYL CITRATE (PF) 100 MCG/2ML SOLUTION: Performed by: NURSE ANESTHETIST, CERTIFIED REGISTERED

## 2024-10-18 PROCEDURE — 25810000003 LACTATED RINGERS PER 1000 ML: Performed by: STUDENT IN AN ORGANIZED HEALTH CARE EDUCATION/TRAINING PROGRAM

## 2024-10-18 PROCEDURE — S0260 H&P FOR SURGERY: HCPCS | Performed by: STUDENT IN AN ORGANIZED HEALTH CARE EDUCATION/TRAINING PROGRAM

## 2024-10-18 PROCEDURE — 25010000002 LIDOCAINE 1 % SOLUTION: Performed by: STUDENT IN AN ORGANIZED HEALTH CARE EDUCATION/TRAINING PROGRAM

## 2024-10-18 PROCEDURE — 88307 TISSUE EXAM BY PATHOLOGIST: CPT | Performed by: STUDENT IN AN ORGANIZED HEALTH CARE EDUCATION/TRAINING PROGRAM

## 2024-10-18 PROCEDURE — 25010000002 LIDOCAINE PF 2% 2 % SOLUTION: Performed by: NURSE ANESTHETIST, CERTIFIED REGISTERED

## 2024-10-18 PROCEDURE — 25010000002 CEFAZOLIN PER 500 MG: Performed by: STUDENT IN AN ORGANIZED HEALTH CARE EDUCATION/TRAINING PROGRAM

## 2024-10-18 PROCEDURE — 25010000002 HEPARIN (PORCINE) PER 1000 UNITS: Performed by: STUDENT IN AN ORGANIZED HEALTH CARE EDUCATION/TRAINING PROGRAM

## 2024-10-18 PROCEDURE — 76098 X-RAY EXAM SURGICAL SPECIMEN: CPT

## 2024-10-18 PROCEDURE — 25010000002 FENTANYL CITRATE (PF) 50 MCG/ML SOLUTION: Performed by: ANESTHESIOLOGY

## 2024-10-18 PROCEDURE — 19125 EXCISION BREAST LESION: CPT | Performed by: STUDENT IN AN ORGANIZED HEALTH CARE EDUCATION/TRAINING PROGRAM

## 2024-10-18 PROCEDURE — 25010000002 DEXAMETHASONE PER 1 MG: Performed by: ANESTHESIOLOGY

## 2024-10-18 PROCEDURE — 25010000002 PROPOFOL 10 MG/ML EMULSION: Performed by: NURSE ANESTHETIST, CERTIFIED REGISTERED

## 2024-10-18 PROCEDURE — 25010000002 BUPIVACAINE 0.25 % SOLUTION: Performed by: STUDENT IN AN ORGANIZED HEALTH CARE EDUCATION/TRAINING PROGRAM

## 2024-10-18 DEVICE — LIGACLIP MCA MULTIPLE CLIP APPLIERS, 30 MEDIUM CLIPS
Type: IMPLANTABLE DEVICE | Site: BREAST | Status: FUNCTIONAL
Brand: LIGACLIP

## 2024-10-18 RX ORDER — SODIUM CHLORIDE 0.9 % (FLUSH) 0.9 %
3 SYRINGE (ML) INJECTION AS NEEDED
Status: DISCONTINUED | OUTPATIENT
Start: 2024-10-18 | End: 2024-10-18 | Stop reason: HOSPADM

## 2024-10-18 RX ORDER — FENTANYL CITRATE 50 UG/ML
50 INJECTION, SOLUTION INTRAMUSCULAR; INTRAVENOUS
Status: COMPLETED | OUTPATIENT
Start: 2024-10-18 | End: 2024-10-18

## 2024-10-18 RX ORDER — ACETAMINOPHEN 325 MG/1
975 TABLET ORAL EVERY 8 HOURS
Start: 2024-10-18 | End: 2025-10-18

## 2024-10-18 RX ORDER — FENTANYL CITRATE 50 UG/ML
INJECTION, SOLUTION INTRAMUSCULAR; INTRAVENOUS AS NEEDED
Status: DISCONTINUED | OUTPATIENT
Start: 2024-10-18 | End: 2024-10-18 | Stop reason: SURG

## 2024-10-18 RX ORDER — IBUPROFEN 200 MG
600 TABLET ORAL EVERY 8 HOURS
Start: 2024-10-18 | End: 2025-10-18

## 2024-10-18 RX ORDER — LIDOCAINE HYDROCHLORIDE 10 MG/ML
INJECTION, SOLUTION INFILTRATION; PERINEURAL AS NEEDED
Status: DISCONTINUED | OUTPATIENT
Start: 2024-10-18 | End: 2024-10-18 | Stop reason: HOSPADM

## 2024-10-18 RX ORDER — LIDOCAINE HYDROCHLORIDE 20 MG/ML
INJECTION, SOLUTION EPIDURAL; INFILTRATION; INTRACAUDAL; PERINEURAL AS NEEDED
Status: DISCONTINUED | OUTPATIENT
Start: 2024-10-18 | End: 2024-10-18 | Stop reason: SURG

## 2024-10-18 RX ORDER — FLUMAZENIL 0.1 MG/ML
0.2 INJECTION INTRAVENOUS AS NEEDED
Status: DISCONTINUED | OUTPATIENT
Start: 2024-10-18 | End: 2024-10-18 | Stop reason: HOSPADM

## 2024-10-18 RX ORDER — SODIUM CHLORIDE 0.9 % (FLUSH) 0.9 %
3-10 SYRINGE (ML) INJECTION AS NEEDED
Status: DISCONTINUED | OUTPATIENT
Start: 2024-10-18 | End: 2024-10-18 | Stop reason: HOSPADM

## 2024-10-18 RX ORDER — HYDROCODONE BITARTRATE AND ACETAMINOPHEN 5; 325 MG/1; MG/1
1 TABLET ORAL EVERY 4 HOURS PRN
Status: DISCONTINUED | OUTPATIENT
Start: 2024-10-18 | End: 2024-10-18 | Stop reason: HOSPADM

## 2024-10-18 RX ORDER — DEXAMETHASONE SODIUM PHOSPHATE 4 MG/ML
4 INJECTION, SOLUTION INTRA-ARTICULAR; INTRALESIONAL; INTRAMUSCULAR; INTRAVENOUS; SOFT TISSUE ONCE AS NEEDED
Status: COMPLETED | OUTPATIENT
Start: 2024-10-18 | End: 2024-10-18

## 2024-10-18 RX ORDER — ONDANSETRON 4 MG/1
4 TABLET, FILM COATED ORAL EVERY 8 HOURS PRN
Qty: 15 TABLET | Refills: 0 | Status: SHIPPED | OUTPATIENT
Start: 2024-10-18 | End: 2025-10-18

## 2024-10-18 RX ORDER — SCOLOPAMINE TRANSDERMAL SYSTEM 1 MG/1
1 PATCH, EXTENDED RELEASE TRANSDERMAL ONCE
Status: DISCONTINUED | OUTPATIENT
Start: 2024-10-18 | End: 2024-10-18 | Stop reason: HOSPADM

## 2024-10-18 RX ORDER — MIDAZOLAM HYDROCHLORIDE 2 MG/2ML
2 INJECTION, SOLUTION INTRAMUSCULAR; INTRAVENOUS
Status: DISCONTINUED | OUTPATIENT
Start: 2024-10-18 | End: 2024-10-18 | Stop reason: HOSPADM

## 2024-10-18 RX ORDER — HEPARIN SODIUM 5000 [USP'U]/ML
5000 INJECTION, SOLUTION INTRAVENOUS; SUBCUTANEOUS EVERY 8 HOURS SCHEDULED
Status: DISCONTINUED | OUTPATIENT
Start: 2024-10-18 | End: 2024-10-18 | Stop reason: HOSPADM

## 2024-10-18 RX ORDER — SODIUM CHLORIDE, SODIUM LACTATE, POTASSIUM CHLORIDE, CALCIUM CHLORIDE 600; 310; 30; 20 MG/100ML; MG/100ML; MG/100ML; MG/100ML
100 INJECTION, SOLUTION INTRAVENOUS CONTINUOUS
Status: DISCONTINUED | OUTPATIENT
Start: 2024-10-18 | End: 2024-10-18 | Stop reason: HOSPADM

## 2024-10-18 RX ORDER — PROPOFOL 10 MG/ML
VIAL (ML) INTRAVENOUS AS NEEDED
Status: DISCONTINUED | OUTPATIENT
Start: 2024-10-18 | End: 2024-10-18 | Stop reason: SURG

## 2024-10-18 RX ORDER — ONDANSETRON 2 MG/ML
4 INJECTION INTRAMUSCULAR; INTRAVENOUS ONCE AS NEEDED
Status: DISCONTINUED | OUTPATIENT
Start: 2024-10-18 | End: 2024-10-18 | Stop reason: HOSPADM

## 2024-10-18 RX ORDER — SODIUM CHLORIDE 0.9 % (FLUSH) 0.9 %
3 SYRINGE (ML) INJECTION EVERY 12 HOURS SCHEDULED
Status: DISCONTINUED | OUTPATIENT
Start: 2024-10-18 | End: 2024-10-18 | Stop reason: HOSPADM

## 2024-10-18 RX ORDER — SODIUM CHLORIDE, SODIUM LACTATE, POTASSIUM CHLORIDE, CALCIUM CHLORIDE 600; 310; 30; 20 MG/100ML; MG/100ML; MG/100ML; MG/100ML
1000 INJECTION, SOLUTION INTRAVENOUS CONTINUOUS
Status: DISCONTINUED | OUTPATIENT
Start: 2024-10-18 | End: 2024-10-18 | Stop reason: HOSPADM

## 2024-10-18 RX ORDER — OXYCODONE HYDROCHLORIDE 5 MG/1
5 TABLET ORAL EVERY 8 HOURS PRN
Qty: 5 TABLET | Refills: 0 | Status: SHIPPED | OUTPATIENT
Start: 2024-10-18 | End: 2025-10-18

## 2024-10-18 RX ORDER — BUPIVACAINE HYDROCHLORIDE 2.5 MG/ML
INJECTION, SOLUTION INFILTRATION; PERINEURAL AS NEEDED
Status: DISCONTINUED | OUTPATIENT
Start: 2024-10-18 | End: 2024-10-18 | Stop reason: HOSPADM

## 2024-10-18 RX ORDER — NALOXONE HCL 0.4 MG/ML
0.4 VIAL (ML) INJECTION AS NEEDED
Status: DISCONTINUED | OUTPATIENT
Start: 2024-10-18 | End: 2024-10-18 | Stop reason: HOSPADM

## 2024-10-18 RX ORDER — DROPERIDOL 2.5 MG/ML
0.62 INJECTION, SOLUTION INTRAMUSCULAR; INTRAVENOUS ONCE AS NEEDED
Status: DISCONTINUED | OUTPATIENT
Start: 2024-10-18 | End: 2024-10-18 | Stop reason: HOSPADM

## 2024-10-18 RX ORDER — HYDROCODONE BITARTRATE AND ACETAMINOPHEN 10; 325 MG/1; MG/1
1 TABLET ORAL EVERY 4 HOURS PRN
Status: DISCONTINUED | OUTPATIENT
Start: 2024-10-18 | End: 2024-10-18 | Stop reason: HOSPADM

## 2024-10-18 RX ORDER — ACETAMINOPHEN 500 MG
1000 TABLET ORAL ONCE
Status: COMPLETED | OUTPATIENT
Start: 2024-10-18 | End: 2024-10-18

## 2024-10-18 RX ORDER — LABETALOL HYDROCHLORIDE 5 MG/ML
5 INJECTION, SOLUTION INTRAVENOUS
Status: DISCONTINUED | OUTPATIENT
Start: 2024-10-18 | End: 2024-10-18 | Stop reason: HOSPADM

## 2024-10-18 RX ORDER — LIDOCAINE HYDROCHLORIDE 10 MG/ML
0.5 INJECTION, SOLUTION EPIDURAL; INFILTRATION; INTRACAUDAL; PERINEURAL ONCE AS NEEDED
Status: DISCONTINUED | OUTPATIENT
Start: 2024-10-18 | End: 2024-10-18 | Stop reason: HOSPADM

## 2024-10-18 RX ADMIN — PROPOFOL 200 MG: 10 INJECTION, EMULSION INTRAVENOUS at 12:05

## 2024-10-18 RX ADMIN — FENTANYL CITRATE 100 MCG: 50 INJECTION, SOLUTION INTRAMUSCULAR; INTRAVENOUS at 12:05

## 2024-10-18 RX ADMIN — ACETAMINOPHEN 1000 MG: 500 TABLET, FILM COATED ORAL at 10:58

## 2024-10-18 RX ADMIN — HEPARIN SODIUM 5000 UNITS: 5000 INJECTION, SOLUTION INTRAVENOUS; SUBCUTANEOUS at 10:58

## 2024-10-18 RX ADMIN — FENTANYL CITRATE 50 MCG: 50 INJECTION, SOLUTION INTRAMUSCULAR; INTRAVENOUS at 13:12

## 2024-10-18 RX ADMIN — FENTANYL CITRATE 50 MCG: 50 INJECTION, SOLUTION INTRAMUSCULAR; INTRAVENOUS at 13:23

## 2024-10-18 RX ADMIN — SCOPALAMINE 1 PATCH: 1 PATCH, EXTENDED RELEASE TRANSDERMAL at 10:58

## 2024-10-18 RX ADMIN — DEXAMETHASONE SODIUM PHOSPHATE 4 MG: 4 INJECTION, SOLUTION INTRA-ARTICULAR; INTRALESIONAL; INTRAMUSCULAR; INTRAVENOUS; SOFT TISSUE at 10:58

## 2024-10-18 RX ADMIN — LIDOCAINE HYDROCHLORIDE 80 MG: 20 INJECTION, SOLUTION EPIDURAL; INFILTRATION; INTRACAUDAL; PERINEURAL at 12:05

## 2024-10-18 RX ADMIN — SODIUM CHLORIDE, POTASSIUM CHLORIDE, SODIUM LACTATE AND CALCIUM CHLORIDE 1000 ML: 600; 310; 30; 20 INJECTION, SOLUTION INTRAVENOUS at 09:14

## 2024-10-18 RX ADMIN — CEFAZOLIN 2000 MG: 2 INJECTION, POWDER, FOR SOLUTION INTRAMUSCULAR; INTRAVENOUS at 12:05

## 2024-10-18 RX ADMIN — HYDROCODONE BITARTRATE AND ACETAMINOPHEN 1 TABLET: 10; 325 TABLET ORAL at 13:11

## 2024-10-18 NOTE — DISCHARGE INSTRUCTIONS
Wound:   - you have skin glue on your incisions. Okay to shower tomorrow.   - Leave skin glue in place, it should slowly fall off over 2 weeks   - No swimming/soaking/bathing x 2 weeks to allow incisions to heal.     Activity:   - Activity as tolerated. NO heavy lifting x 1-2 weeks with the left arm - no more than 25lb at a time.   - No driving or operating machinery on narcotic pain medication.     Pain medication:   - Take 1000mg of tylenol every 8 hours for 3 days. After three days, take it prn.   - Take 600mg of ibuprofen (motrin) every 8 hours for 3 days. After three days, take it prn.   - You have a prescription for a narcotic. It will be roxicodone 5mg tabs. Take these only as needed after you have taken the tylenol and ibuprofen. If you are taking the roxicodone, make sure to take a stool softener (colace) with it as it can cause constipation.   - The narcotic may make you nauseated, you will have a prescription for zofran in case of nausea.     Follow up:   - make an appointment to see me in 1.5 weeks   - If you have any concerns before then, call me office at [phone]

## 2024-10-18 NOTE — ANESTHESIA PROCEDURE NOTES
Airway  Urgency: elective    Date/Time: 10/18/2024 12:05 PM  Airway not difficult    General Information and Staff    Patient location during procedure: OR  CRNA/CAA: Raúl Navas CRNA    Indications and Patient Condition  Indications for airway management: airway protection    Preoxygenated: yes  MILS maintained throughout  Mask difficulty assessment: 1 - vent by mask    Final Airway Details  Final airway type: supraglottic airway      Successful airway: I-gel  Size 4     Number of attempts at approach: 1  Assessment: lips, teeth, and gum same as pre-op

## 2024-10-18 NOTE — H&P
Delfina Baker MD - General Surgery History and Physical     Referring Provider: Delfina Baker MD    Patient Care Team:  Roberto Guillaume MD as PCP - General (Family Medicine)    Chief complaint breast abnormality     Subjective .     History of present illness:  The patient is a 44 y.o. female with ADH of the left breast as well as abnormal calcifications.Two magseeds were placed. She presents today for excisional biopsy     Review of Systems    Review of Systems - General ROS: negative  ENT ROS: negative  Respiratory ROS: no cough, shortness of breath, or wheezing  Cardiovascular ROS: no chest pain or dyspnea on exertion  Gastrointestinal ROS: no abdominal pain, change in bowel habits, or black or bloody stools  Genito-Urinary ROS: no dysuria, trouble voiding, or hematuria  Dermatological ROS: negative   Breast ROS: s/p breast biopsy   Hematological and Lymphatic ROS: negative  Musculoskeletal ROS: negative   Neurological ROS: no TIA or stroke symptoms    Psychological ROS: negative  Endocrine ROS: negative    History  Past Medical History:   Diagnosis Date    Asthma     Increasing shortness of breath in revent months    Dental disease     Almost all top teeth are crowns    Dizziness     Always attributed to blood pressure    GERD (gastroesophageal reflux disease)     Headache     Hyperlipidemia     Hypertension     hx of    Insomnia     Muscle spasm of right shoulder     Nosebleed     Sinusitis     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   ,   Past Surgical History:   Procedure Laterality Date    BREAST BIOPSY Right     benign    CARPAL TUNNEL RELEASE Bilateral      SECTION      COLONOSCOPY      ENDOMETRIAL ABLATION Bilateral 2018    tubal ligtion at same time    ENDOSCOPIC FUNCTIONAL SINUS SURGERY (FESS) Bilateral 2024    Procedure: Bilateral functional endoscopic anterior ethmoidectomy with bilateral middle meatal antrostomy, Septoplasty, and Bilateral inferior  turbinate reduction via Coblation;  Surgeon: Jadiel Smith MD;  Location: [facility];  Service: ENT;  Laterality: Bilateral;    LAPAROSCOPIC TUBAL LIGATION     ,   Family History   Problem Relation Age of Onset    Seizures Mother     Migraines Mother     Hypertension Father     Breast cancer Paternal Grandmother    ,   Social History     Tobacco Use    Smoking status: Former     Current packs/day: 0.50     Average packs/day: 0.5 packs/day for 20.0 years (10.0 ttl pk-yrs)     Types: Cigarettes    Smokeless tobacco: Never    Tobacco comments:     Down from 2 packs a day to 0.5 packs with patch    Vaping Use    Vaping status: Every Day    Substances: Nicotine, Flavoring   Substance Use Topics    Alcohol use: Not Currently     Alcohol/week: 4.0 standard drinks of alcohol     Types: 4 Shots of liquor per week    Drug use: Never   ,   Facility-Administered Medications Prior to Admission   Medication Dose Route Frequency Provider Last Rate Last Admin    lidocaine (XYLOCAINE) 1 % injection 10 mL  10 mL Subcutaneous Once Leonela Banks PA        lidocaine (XYLOCAINE) 1 % injection 10 mL  10 mL Subcutaneous Once Delfina Baker MD        lidocaine 1% - EPINEPHrine 1:[id number] (XYLOCAINE W/EPI) 1 %-1:387795 injection 10 mL  10 mL Injection Once Leonela Banks PA         Medications Prior to Admission   Medication Sig Dispense Refill Last Dose/Taking    Azelastine HCl 137 MCG/SPRAY solution 1 spray into the nostril(s) as directed by provider 2 (Two) Times a Day. (Patient taking differently: Administer 1 spray into the nostril(s) as directed by provider Daily.) 30 mL 2 10/17/2024    ergocalciferol (ERGOCALCIFEROL) 1.25 MG (81086 UT) capsule Take 1 capsule by mouth 1 (One) Time Per Week. 4 capsule 3 10/17/2024    eszopiclone (LUNESTA) 3 MG tablet Take 1 tablet by mouth every night at bedtime. Take immediately before bedtime 90 tablet 0 10/11/2024    eszopiclone (LUNESTA) 3 MG tablet take 1 tablet by  mouth immediately before bedtime 90 tablet 0 Taking    eszopiclone (LUNESTA) 3 MG tablet Take 1 tablet by mouth every night at bedtime. Take immediately before bedtime 90 tablet 0 Taking    melatonin 5 MG tablet tablet Take 1 tablet by mouth At Night As Needed.   10/17/2024    propranolol LA (INDERAL LA) 120 MG 24 hr capsule Take 1 capsule by mouth Daily. (Patient taking differently: Take 1 capsule by mouth Every Night.) 30 capsule 3 10/17/2024 at  8:00 PM    rOPINIRole (REQUIP) 1 MG tablet Take 1 tablet by mouth every night 1-3 hours before bedtime. Take 1 hour before bedtime. 90 tablet 3 10/17/2024    rosuvastatin (CRESTOR) 20 MG tablet Take 1 tablet by mouth Daily. 90 tablet 3 10/17/2024    tiZANidine (ZANAFLEX) 4 MG tablet Take 1 tablet by mouth at bedtime as needed for muscle spasms. 30 tablet 2 10/17/2024    topiramate (TOPAMAX) 100 MG tablet Take 1 tablet by mouth 2 (Two) Times a Day. 180 tablet 3 10/17/2024    fluticasone (FLONASE) 50 MCG/ACT nasal spray 1 spray into the nostril(s) as directed by provider. 16 g 1     mupirocin (BACTROBAN) 2 % ointment Apply to the nose twice daily until follow up 22 g 0     mupirocin (BACTROBAN) 2 % ointment Apply to nose twice daily 22 g 0     rosuvastatin (CRESTOR) 10 MG tablet Take 1 tablet by mouth every night at bedtime. 30 tablet 2     tiZANidine (ZANAFLEX) 4 MG tablet Take 1 tablet by mouth at bedtime as needed for muscle spasms. 30 tablet 2     topiramate (TOPAMAX) 50 MG tablet Take 1 tablet by mouth twice daily. 60 tablet 6     and Allergies:  Sulfa antibiotics    Current Facility-Administered Medications:     ceFAZolin 2000 mg IVPB in 100 mL NS (MBP), 2,000 mg, Intravenous, Once, Delfina Baker MD    heparin (porcine) 5000 UNIT/ML injection 5,000 Units, 5,000 Units, Subcutaneous, Q8H, Delfina Baker MD, 5,000 Units at 10/18/24 1058    lactated ringers infusion 1,000 mL, 1,000 mL, Intravenous, Continuous, Delfina Baker MD, Last Rate: 25 mL/hr at  10/18/24 0914, 1,000 mL at 10/18/24 0914    lactated ringers infusion, 100 mL/hr, Intravenous, Continuous, Rema Beal MD    lidocaine PF 1% (XYLOCAINE) injection 0.5 mL, 0.5 mL, Intradermal, Once PRN, Delfina Baker MD    Midazolam HCl (PF) (VERSED) injection 2 mg, 2 mg, Intravenous, Q10 Min PRN, Rema Beal MD    scopolamine patch 1 mg/72 hr, 1 patch, Transdermal, Once, Rema Bela MD, 1 patch at 10/18/24 1058    sodium chloride 0.9 % flush 3 mL, 3 mL, Intravenous, PRN, Delfina Baker MD    sodium chloride 0.9 % flush 3 mL, 3 mL, Intravenous, Q12H, Rema Beal MD    sodium chloride 0.9 % flush 3-10 mL, 3-10 mL, Intravenous, PRN, Rema Beal MD    Objective     Vital Signs   Temp:  [97.5 °F (36.4 °C)] 97.5 °F (36.4 °C)  Heart Rate:  [62-71] 71  Resp:  [16] 16  BP: (141)/(90) 141/90    Physical Exam:  General appearance - alert, well appearing, and in no distress  Mental status - alert, oriented to person, place, and time  Eyes - pupils equal and reactive, extraocular eye movements intact  Neck - supple, no significant adenopathy  Neurological - alert, oriented, normal speech, no focal findings or movement disorder noted  Breast Exam: Bilateral breasts without obvious deformities. Bilateral nipples everted. Patient examined in the supine position with the ipsilateral arm above the head. No palpable masses bilaterally. No nipple discharge bilaterally. No palpable axillary nor supraclavicular adenopathy bilaterally.     Results Review:     Lab Results (last 24 hours)       Procedure Component Value Units Date/Time    Pregnancy, Urine - Urine, Clean Catch [[id number]]  (Normal) Collected: 10/18/24 0852    Specimen: Urine, Clean Catch Updated: 10/18/24 0920     HCG, Urine QL Negative          Imaging Results (Last 24 Hours)       ** No results found for the last 24 hours. **              Assessment & Plan       To OR today for excisional breast  biopsy x 2 with magseed guidance.       Delfina Baker MD  10/18/24  11:54 CDT

## 2024-10-18 NOTE — OP NOTE
Left Lumpectomy x 2 Operative Report:     Patient: Gloria Melgoza  MRN: [id number]    YOB: 1980  Age: 44 y.o.  Sex: female  Unit:  PAD OR Room/Bed: PAD OR/MAIN OR Location: [facility]      Admitting Physician: ALL METZGER    Primary Care Physician: Roberto Guillaume MD             INDICATIONS: This is a 44 y.o. female who presents with ADH and abnormal calcifications of the left breast for excisional biopsy x 2.     DATE OF OPERATION: 10/18/2024     Surgeons and Role:     * All Metzger MD - Primary  Danni Davidson PA-C - assist     ANESTHESIA: General     PREOPERATIVE DIAGNOSIS: Atypical ductal hyperplasia of breast [N60.99]    POSTOPERATIVE DIAGNOSIS: Same    PROCEDURES PERFORMED:    (1) Left Lumpectomy with magseed guidance at 1:00   (2) Left Lumpectomy with magseed guidance at 4:00     PROCEDURE DETAILS:    After informed consent was obtained, the patient was brought to the operating room and the site of surgery was confirmed. General anesthesia was induced.  The left chest and left axilla were prepped with ChloraPrep and draped in sterile fashion. A time out was completed verifying the correct patient, procedure, site, positioning and special equipment needed prior to incision.   The Mag-Probe was used to localize the excision target with the preoperatively placed Magseed on the left breast at 1:00. Local anesthetic was infiltrated. An incision was made over the seed and lesion. Skin flaps were raised circumferentially. I dissected down to the lesion with cautery, ensuring to use the Mag-Probe to guide me. When I was close to the lesion based on counts, a plastic [place] was placed on the tissue and the lesion and a rim of normal tissue was dissected free with cautery. It was marked with a short stitch superior, double long lateral and single long anterior. It was placed on a grid in the [facility] for mammogram and then sent for permanent pathology. The mammogram noted the  seed and calcifications in the original specimen. Hemostasis was obtained in the cavity. The cavity was irrigated. The incision was closed with interrupted 3-0 Vicryl dermal sutures followed by a running 4-0 Monocryl subcuticular.     The Mag-Probe was used to localize the excision target with the preoperatively placed Magseed on the left breast at 4:00. Local anesthetic was infiltrated. An incision was made over the seed and lesion. Skin flaps were raised circumferentially. I dissected down to the lesion with cautery, ensuring to use the Mag-Probe to guide me. When I was close to the lesion based on counts, a plastic [place] was placed on the tissue and the lesion and a rim of normal tissue was dissected free with cautery. It was marked with a short stitch superior, double long lateral and single long anterior. It was placed on a grid in the [facility] for mammogram and then sent for permanent pathology. The mammogram noted the seed, clip and calcifications in the original specimen. Hemostasis was obtained in the cavity. The cavity was irrigated. Clips were placed for post-op radiation guidance. The incision was closed with interrupted 3-0 Vicryl dermal sutures followed by a running 4-0 Monocryl subcuticular.     Both incisions were dressed with skin glue. The breast incision was then dressed with Telfa and Tegaderm and covered with a surgical bra. The patient tolerated the procedure well and was transferred to PACU in good condition.     Danni Davidson PA-C was responsible for performing the following activities: Retraction, Suction, Irrigation, Suturing, Closing, and Placing Dressing and their skilled assistance was necessary for the success of this case.    Findings: seed clip and calcs in one specimen, seed and calcs in the second    Estimated Blood Loss:  15 mL   Complications: none apparent   Specimens:   (1) Left lumpectomy at 1:00 - double short superior, double long left lateral, single long anterior   (2)  Left lumpectomy at 4:00 - double short superior, double long left lateral, single long anterior      This procedure was not performed to treat breast cancer through sentinel node biopsy    Disposition: PACU - hemodynamically stable.           Condition: stable    Delfina Baker MD  09/20/2024

## 2024-10-19 NOTE — ANESTHESIA POSTPROCEDURE EVALUATION
Patient: Gloria Melgoza    Procedure Summary       Date: 10/18/24 Room / Location:  PAD OR  /  PAD OR    Anesthesia Start: 1200 Anesthesia Stop: 1308    Procedure: LEFT BREAST LUMPECTOMY WITH MAG SEED GUIDANCE x 2 (Left: Breast) Diagnosis:       Atypical ductal hyperplasia of breast      (Atypical ductal hyperplasia of breast [N60.99])    Surgeons: Delfina Baker MD Provider: Junaid Jernigan CRNA    Anesthesia Type: general ASA Status: 2            Anesthesia Type: general    Vitals  Vitals Value Taken Time   /72 10/18/24 1347   Temp 97.2 °F (36.2 °C) 10/18/24 1350   Pulse 75 10/18/24 1351   Resp 14 10/18/24 1345   SpO2 95 % 10/18/24 1351   Vitals shown include unfiled device data.        Post Anesthesia Care and Evaluation    PONV Status: none  Comments: Patient d/c from PACU prior to anes eval based on Mary score.  Please see RN notes for details of d/c criteria.    Blood pressure 109/76, pulse 71, temperature 97.2 °F (36.2 °C), temperature source Temporal, resp. rate 16, SpO2 93%, not currently breastfeeding.

## 2024-10-21 ENCOUNTER — OFFICE VISIT (OUTPATIENT)
Dept: OTOLARYNGOLOGY | Facility: CLINIC | Age: 44
End: 2024-10-21
Payer: COMMERCIAL

## 2024-10-21 ENCOUNTER — TELEPHONE (OUTPATIENT)
Dept: SURGERY | Facility: CLINIC | Age: 44
End: 2024-10-21
Payer: COMMERCIAL

## 2024-10-21 VITALS
TEMPERATURE: 97.1 F | BODY MASS INDEX: 27.23 KG/M2 | HEIGHT: 63 IN | SYSTOLIC BLOOD PRESSURE: 122 MMHG | HEART RATE: 73 BPM | WEIGHT: 153.66 LBS | DIASTOLIC BLOOD PRESSURE: 69 MMHG

## 2024-10-21 DIAGNOSIS — J34.89 SINUS PRESSURE: ICD-10-CM

## 2024-10-21 DIAGNOSIS — R09.81 NASAL CONGESTION: ICD-10-CM

## 2024-10-21 DIAGNOSIS — R51.9 INTRACTABLE HEADACHE, UNSPECIFIED CHRONICITY PATTERN, UNSPECIFIED HEADACHE TYPE: ICD-10-CM

## 2024-10-21 DIAGNOSIS — J34.2 NASAL SEPTAL DEVIATION: ICD-10-CM

## 2024-10-21 DIAGNOSIS — J32.9 CHRONIC RHINOSINUSITIS: ICD-10-CM

## 2024-10-21 DIAGNOSIS — Z98.890 S/P FESS (FUNCTIONAL ENDOSCOPIC SINUS SURGERY): Primary | ICD-10-CM

## 2024-10-21 LAB
CYTO UR: NORMAL
LAB AP CASE REPORT: NORMAL
LAB AP CLINICAL INFORMATION: NORMAL
Lab: NORMAL
PATH REPORT.FINAL DX SPEC: NORMAL
PATH REPORT.GROSS SPEC: NORMAL

## 2024-10-21 PROCEDURE — 99213 OFFICE O/P EST LOW 20 MIN: CPT | Performed by: NURSE PRACTITIONER

## 2024-10-21 NOTE — TELEPHONE ENCOUNTER
Post OP phone call visit:    Type of surgery: Left breast lumpectomy with magseed guidance X2.   How are you feeling? Okay.   Are you having any pain or Nausea? Some pain. Taking Tylenol and Ibuprofen. No nausea.   Do you have a normal appetite? Yes.  Are you passing gas and having any BM? Having BM.   How is your activity? Good.  Any drainage or fever? No redness. No drainage. No fever.

## 2024-10-21 NOTE — PATIENT INSTRUCTIONS
CONTACT INFORMATION:  The main office phone number is [phone]. For emergencies after hours and on weekends, this number will convert over to our answering service and the on call provider will answer. Please try to keep non emergent phone calls/ questions to office hours 9am-5pm Monday through Friday.      [employer]  As an alternative, you can sign up and use the Epic MyChart system for more direct and quicker access for non emergent questions/ problems.  [employer] allows you to send messages to your doctor, view your test results, renew your prescriptions, schedule appointments, and more. To sign up, go to [employer] and click on the Sign Up Now link in the New User? box. Enter your [employer] Activation Code exactly as it appears below along with the last four digits of your Social Security Number and your Date of Birth () to complete the sign-up process. If you do not sign up before the expiration date, you must request a new code.     [employer] Activation Code: Activation code not generated  Current [employer] Status: Active     If you have questions, you can email [email or web address] or call [phone] to talk to our [employer] staff. Remember, [employer] is NOT to be used for urgent needs. For medical emergencies, dial 911.     IF YOU SMOKE OR USE TOBACCO PLEASE READ THE FOLLOWING:  Why is smoking bad for me?  Smoking increases the risk of heart disease, lung disease, vascular disease, stroke, and cancer. If you smoke, STOP!        IF YOU SMOKE OR USE TOBACCO PLEASE READ THE FOLLOWING:  Why is smoking bad for me?  Smoking increases the risk of heart disease, lung disease, vascular disease, stroke, and cancer. If you smoke, STOP!     For more information:  Quit Now [place]  -QUIT-NOW  [email or web address]

## 2024-10-21 NOTE — PROGRESS NOTES
YOB: 1980  Location: [facility]  Location Address: [address] [place] [address], [facility], [address] [place] [address]  Location Phone: [phone]    Chief Complaint   Patient presents with    s/p fess       History of Present Illness  Gloria Melgoza is a 44 y.o. female.  Gloria Melgoza is here for follow up of ENT complaints. The patient is s/p Bilateral functional endoscopic anterior ethmoidectomy with bilateral middle meatal antrostomy, Septoplasty, and Bilateral inferior turbinate reduction via Coblation on 24. She continues to complain of headache. She uses nasal sprays as needed.       Past Medical History:   Diagnosis Date    Asthma     Increasing shortness of breath in revent months    Dental disease     Almost all top teeth are crowns    Dizziness     Always attributed to blood pressure    GERD (gastroesophageal reflux disease)     Headache     Hyperlipidemia     Hypertension     hx of    Insomnia     Muscle spasm of right shoulder     Nosebleed     Sinusitis     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       Past Surgical History:   Procedure Laterality Date    BREAST BIOPSY Right     benign    BREAST LUMPECTOMY WITH SENTINEL NODE BIOPSY Left 10/18/2024    Procedure: LEFT BREAST LUMPECTOMY WITH MAG SEED GUIDANCE x 2;  Surgeon: Delfina Baker MD;  [facility];  Service: General;  Laterality: Left;    CARPAL TUNNEL RELEASE Bilateral      SECTION      COLONOSCOPY      ENDOMETRIAL ABLATION Bilateral 2018    tubal ligtion at same time    ENDOSCOPIC FUNCTIONAL SINUS SURGERY (FESS) Bilateral 2024    Procedure: Bilateral functional endoscopic anterior ethmoidectomy with bilateral middle meatal antrostomy, Septoplasty, and Bilateral inferior turbinate reduction via Coblation;  Surgeon: Jadiel Smith MD;  Location: [facility] OR;  Service: ENT;  Laterality: Bilateral;    LAPAROSCOPIC TUBAL LIGATION      SINUS SURGERY  24       Outpatient  Medications Marked as Taking for the 10/21/24 encounter (Office Visit) with Bertin Monique APRN   Medication Sig Dispense Refill    acetaminophen (Tylenol) 325 MG tablet Take 3 tablets by mouth Every 8 (Eight) Hours. Take every 8 hours for 3 days then take prn as needed.      Azelastine HCl 137 MCG/SPRAY solution 1 spray into the nostril(s) as directed by provider 2 (Two) Times a Day. (Patient taking differently: Administer 1 spray into the nostril(s) as directed by provider Daily.) 30 mL 2    eszopiclone (LUNESTA) 3 MG tablet Take 1 tablet by mouth every night at bedtime. Take immediately before bedtime 90 tablet 0    eszopiclone (LUNESTA) 3 MG tablet take 1 tablet by mouth immediately before bedtime 90 tablet 0    eszopiclone (LUNESTA) 3 MG tablet Take 1 tablet by mouth every night at bedtime. Take immediately before bedtime 90 tablet 0    fluticasone (FLONASE) 50 MCG/ACT nasal spray 1 spray into the nostril(s) as directed by provider. 16 g 1    ibuprofen (Motrin IB) 200 MG tablet Take 3 tablets by mouth Every 8 (Eight) Hours. Take every 8 hours for three days then take as needed.      ondansetron (Zofran) 4 MG tablet Take 1 tablet by mouth Every 8 (Eight) Hours As Needed for Nausea or Vomiting. 15 tablet 0    oxyCODONE (Roxicodone) 5 MG immediate release tablet Take 1 tablet by mouth Every 8 (Eight) Hours As Needed for Severe Pain. 5 tablet 0    propranolol LA (INDERAL LA) 120 MG 24 hr capsule Take 1 capsule by mouth Daily. (Patient taking differently: Take 1 capsule by mouth Every Night.) 30 capsule 3    rOPINIRole (REQUIP) 1 MG tablet Take 1 tablet by mouth every night 1-3 hours before bedtime. Take 1 hour before bedtime. 90 tablet 3    rosuvastatin (CRESTOR) 10 MG tablet Take 1 tablet by mouth every night at bedtime. 30 tablet 2    rosuvastatin (CRESTOR) 20 MG tablet Take 1 tablet by mouth Daily. 90 tablet 3    tiZANidine (ZANAFLEX) 4 MG tablet Take 1 tablet by mouth at bedtime as needed for muscle spasms.  30 tablet 2    tiZANidine (ZANAFLEX) 4 MG tablet Take 1 tablet by mouth at bedtime as needed for muscle spasms. 30 tablet 2    topiramate (TOPAMAX) 100 MG tablet Take 1 tablet by mouth 2 (Two) Times a Day. 180 tablet 3    topiramate (TOPAMAX) 50 MG tablet Take 1 tablet by mouth twice daily. 60 tablet 6     Current Facility-Administered Medications for the 10/21/24 encounter (Office Visit) with Bertin Monique APRN   Medication Dose Route Frequency Provider Last Rate Last Admin    lidocaine (XYLOCAINE) 1 % injection 10 mL  10 mL Subcutaneous Once Leonela Banks PA        lidocaine (XYLOCAINE) 1 % injection 10 mL  10 mL Subcutaneous Once Delfina Baker MD        lidocaine 1% - EPINEPHrine 1:[id number] (XYLOCAINE W/EPI) 1 %-1:[id number] injection 10 mL  10 mL Injection Once Leonela Banks PA           Sulfa antibiotics    Family History   Problem Relation Age of Onset    Seizures Mother     Migraines Mother     Cancer Mother         Pre cancerous lesions in the colon    Hypertension Father     Diabetes Father     Breast cancer Paternal Grandmother     Cancer Paternal Grandmother         Breast and Colon Cancer    Cancer Maternal Grandmother         Bladder    Cancer Paternal Grandfather         Lung    Cancer Paternal Uncle         Bladder       Social History     Socioeconomic History    Marital status:    Tobacco Use    Smoking status: Former     Current packs/day: 0.50     Average packs/day: 0.5 packs/day for 20.0 years (10.0 ttl pk-yrs)     Types: Cigarettes    Smokeless tobacco: Never    Tobacco comments:     Doesn’t smoke cigarettes but have started using vape   Vaping Use    Vaping status: Every Day    Substances: Nicotine, Flavoring   Substance and Sexual Activity    Alcohol use: Yes     Alcohol/week: 2.0 standard drinks of alcohol     Types: 2 Glasses of wine per week    Drug use: Never    Sexual activity: Defer       Review of Systems   Constitutional: Negative.    Neurological:   Positive for headaches.       Vitals:    10/21/24 1437   BP: 122/69   Pulse: 73   Temp: 97.1 °F (36.2 °C)       Body mass index is 27.57 kg/m².    Objective     Physical Exam  Vitals reviewed.   Constitutional:       Appearance: Normal appearance. She is normal weight.   HENT:      Head: Normocephalic.      Right Ear: External ear normal.      Left Ear: External ear normal.      Nose:      Comments: Right-sided nasal crusting noted     Mouth/Throat:      Lips: Pink.      Mouth: Mucous membranes are moist.      Pharynx: Oropharynx is clear.   Musculoskeletal:      Cervical back: Full passive range of motion without pain.   Neurological:      Mental Status: She is alert.   Psychiatric:         Behavior: Behavior is cooperative.         Assessment & Plan   Diagnoses and all orders for this visit:    1. S/P FESS (functional endoscopic sinus surgery) (Primary)    2. Nasal septal deviation    3. Chronic rhinosinusitis    4. Sinus pressure    5. Nasal congestion    6. Intractable headache, unspecified chronicity pattern, unspecified headache type      * Surgery not found *  No orders of the defined types were placed in this encounter.    Will refer to neurology-, as at this point we do not feel headaches are sinus related  Nasal sprays and nasal ointment daily  Return for problems    Return in about 6 months (around 4/21/2025) for Recheck.       Patient Instructions   CONTACT INFORMATION:  The main office phone number is [phone]. For emergencies after hours and on weekends, this number will convert over to our answering service and the on call provider will answer. Please try to keep non emergent phone calls/ questions to office hours 9am-5pm Monday through Friday.      [employer]  As an alternative, you can sign up and use the Epic MyChart system for more direct and quicker access for non emergent questions/ problems.  [employer] allows you to send messages to your doctor, view your test results, renew your  prescriptions, schedule appointments, and more. To sign up, go to [email or web address] and click on the Sign Up Now link in the New User? box. Enter your Pro[employer]Mail Activation Code exactly as it appears below along with the last four digits of your Social Security Number and your Date of Birth () to complete the sign-up process. If you do not sign up before the expiration date, you must request a new code.     Pro[employer] Activation Code: Activation code not generated  Current Pro[employer]Mail Status: Active     If you have questions, you can email [email or web address] or call [phone] to talk to our ProtonMail staff. Remember, [employer]Mail is NOT to be used for urgent needs. For medical emergencies, dial 911.     IF YOU SMOKE OR USE TOBACCO PLEASE READ THE FOLLOWING:  Why is smoking bad for me?  Smoking increases the risk of heart disease, lung disease, vascular disease, stroke, and cancer. If you smoke, STOP!        IF YOU SMOKE OR USE TOBACCO PLEASE READ THE FOLLOWING:  Why is smoking bad for me?  Smoking increases the risk of heart disease, lung disease, vascular disease, stroke, and cancer. If you smoke, STOP!     For more information:  Quit Now [facility]  -QUIT-NOW  [email or web address]

## 2024-10-22 ENCOUNTER — TELEPHONE (OUTPATIENT)
Dept: SURGERY | Facility: CLINIC | Age: 44
End: 2024-10-22
Payer: COMMERCIAL

## 2024-10-23 ENCOUNTER — LAB (OUTPATIENT)
Dept: LAB | Facility: HOSPITAL | Age: 44
End: 2024-10-23
Payer: COMMERCIAL

## 2024-10-23 ENCOUNTER — TRANSCRIBE ORDERS (OUTPATIENT)
Dept: ADMINISTRATIVE | Facility: HOSPITAL | Age: 44
End: 2024-10-23
Payer: COMMERCIAL

## 2024-10-23 DIAGNOSIS — F51.01 PRIMARY INSOMNIA: ICD-10-CM

## 2024-10-23 DIAGNOSIS — N95.1 SYMPTOMATIC MENOPAUSAL OR FEMALE CLIMACTERIC STATES: ICD-10-CM

## 2024-10-23 DIAGNOSIS — G25.81 RESTLESS LEGS: ICD-10-CM

## 2024-10-23 DIAGNOSIS — Z13.31 SCREENING FOR DEPRESSION: ICD-10-CM

## 2024-10-23 DIAGNOSIS — R55 SYNCOPE AND COLLAPSE: ICD-10-CM

## 2024-10-23 DIAGNOSIS — J32.4 CHRONIC PANSINUSITIS: ICD-10-CM

## 2024-10-23 DIAGNOSIS — Z00.00 ROUTINE GENERAL MEDICAL EXAMINATION AT A HEALTH CARE FACILITY: Primary | ICD-10-CM

## 2024-10-23 DIAGNOSIS — Z00.00 ROUTINE GENERAL MEDICAL EXAMINATION AT A HEALTH CARE FACILITY: ICD-10-CM

## 2024-10-23 LAB
25(OH)D3 SERPL-MCNC: 32.2 NG/ML (ref 30–100)
ALBUMIN SERPL-MCNC: 4.1 G/DL (ref 3.5–5.2)
ALBUMIN/GLOB SERPL: 1.7 G/DL
ALP SERPL-CCNC: 96 U/L (ref 39–117)
ALT SERPL W P-5'-P-CCNC: 25 U/L (ref 1–33)
ANION GAP SERPL CALCULATED.3IONS-SCNC: 13 MMOL/L (ref 5–15)
AST SERPL-CCNC: 16 U/L (ref 1–32)
BILIRUB SERPL-MCNC: 0.3 MG/DL (ref 0–1.2)
BUN SERPL-MCNC: 16 MG/DL (ref 6–20)
BUN/CREAT SERPL: 19.8 (ref 7–25)
CALCIUM SPEC-SCNC: 9.2 MG/DL (ref 8.6–10.5)
CHLORIDE SERPL-SCNC: 108 MMOL/L (ref 98–107)
CHOLEST SERPL-MCNC: 152 MG/DL (ref 0–200)
CO2 SERPL-SCNC: 20 MMOL/L (ref 22–29)
CREAT SERPL-MCNC: 0.81 MG/DL (ref 0.57–1)
D DIMER PPP FEU-MCNC: <0.27 MCGFEU/ML (ref 0–0.5)
DEPRECATED RDW RBC AUTO: 43.2 FL (ref 37–54)
EGFRCR SERPLBLD CKD-EPI 2021: 91.9 ML/MIN/1.73
ERYTHROCYTE [DISTWIDTH] IN BLOOD BY AUTOMATED COUNT: 12.9 % (ref 12.3–15.4)
GLOBULIN UR ELPH-MCNC: 2.4 GM/DL
GLUCOSE SERPL-MCNC: 100 MG/DL (ref 65–99)
HCT VFR BLD AUTO: 42.5 % (ref 34–46.6)
HDLC SERPL-MCNC: 35 MG/DL (ref 40–60)
HGB BLD-MCNC: 13.9 G/DL (ref 12–15.9)
LDLC SERPL CALC-MCNC: 62 MG/DL (ref 0–100)
LDLC/HDLC SERPL: 1.31 {RATIO}
MCH RBC QN AUTO: 30.1 PG (ref 26.6–33)
MCHC RBC AUTO-ENTMCNC: 32.7 G/DL (ref 31.5–35.7)
MCV RBC AUTO: 92 FL (ref 79–97)
PLATELET # BLD AUTO: 255 10*3/MM3 (ref 140–450)
PMV BLD AUTO: 10.7 FL (ref 6–12)
POTASSIUM SERPL-SCNC: 3.9 MMOL/L (ref 3.5–5.2)
PROT SERPL-MCNC: 6.5 G/DL (ref 6–8.5)
RBC # BLD AUTO: 4.62 10*6/MM3 (ref 3.77–5.28)
SODIUM SERPL-SCNC: 141 MMOL/L (ref 136–145)
T3FREE SERPL-MCNC: 2.84 PG/ML (ref 2–4.4)
T4 FREE SERPL-MCNC: 1.34 NG/DL (ref 0.93–1.7)
TRIGL SERPL-MCNC: 355 MG/DL (ref 0–150)
TSH SERPL DL<=0.05 MIU/L-ACNC: 1.47 UIU/ML (ref 0.27–4.2)
VLDLC SERPL-MCNC: 55 MG/DL (ref 5–40)
WBC NRBC COR # BLD AUTO: 6.12 10*3/MM3 (ref 3.4–10.8)

## 2024-10-23 PROCEDURE — 84481 FREE ASSAY (FT-3): CPT

## 2024-10-23 PROCEDURE — 85379 FIBRIN DEGRADATION QUANT: CPT

## 2024-10-23 PROCEDURE — 82306 VITAMIN D 25 HYDROXY: CPT

## 2024-10-23 PROCEDURE — 80061 LIPID PANEL: CPT

## 2024-10-23 PROCEDURE — 36415 COLL VENOUS BLD VENIPUNCTURE: CPT

## 2024-10-23 PROCEDURE — 80050 GENERAL HEALTH PANEL: CPT

## 2024-10-23 PROCEDURE — 84439 ASSAY OF FREE THYROXINE: CPT

## 2024-10-28 ENCOUNTER — OFFICE VISIT (OUTPATIENT)
Dept: SURGERY | Facility: CLINIC | Age: 44
End: 2024-10-28
Payer: COMMERCIAL

## 2024-10-28 VITALS
OXYGEN SATURATION: 97 % | WEIGHT: 153 LBS | HEART RATE: 74 BPM | DIASTOLIC BLOOD PRESSURE: 87 MMHG | SYSTOLIC BLOOD PRESSURE: 136 MMHG | BODY MASS INDEX: 27.11 KG/M2 | HEIGHT: 63 IN

## 2024-10-28 DIAGNOSIS — N60.99 ATYPICAL DUCTAL HYPERPLASIA OF BREAST: Primary | ICD-10-CM

## 2024-10-28 PROCEDURE — 99024 POSTOP FOLLOW-UP VISIT: CPT | Performed by: STUDENT IN AN ORGANIZED HEALTH CARE EDUCATION/TRAINING PROGRAM

## 2024-10-28 NOTE — PROGRESS NOTES
"Patient: Gloria Melgoza    YOB: 1980    Date: 10/28/2024    Primary Care Provider: Roberto Guillaume MD    Vital Signs:   Vitals:    10/28/24 0936   BP: 136/87   Pulse: 74   SpO2: 97%   Weight: 69.4 kg (153 lb)   Height: 159 cm (62.6\")       S/p left lumpectomy x 2. Overall doing well. Incisions with no signs of infection; she did have an allergic reaction to the tegaderm.     Results Review:   I reviewed the patient's new clinical results.  Tissue Pathology Exam (10/18/2024 12:20)   1.  Left breast at 4 o'clock position, lumpectomy:  A.  Columnar cell changes and columnar cell hyperplasia without atypia.  B.  Fibrocystic changes with apocrine metaplasia.  C.  Usual ductal hyperplasia.  D.  Sclerosing adenosis with microcalcifications.  E.  No residual atypical ductal hyperplasia identified.  F.  Prior biopsy site changes.  G.  No histologic evidence of malignancy.     2.  Left breast at 1 o'clock position, lumpectomy:  A.  Fibrocystic changes with apocrine metaplasia.  B.  Radial scars (2) with associated microcalcifications.  C.  Usual ductal hyperplasia.  D.  Sclerosing adenosis.  E.  Columnar cell changes without atypia.  F.  No atypical ductal hyperplasia identified.  G.  Prior biopsy site changes identified.  H.  No histologic evidence of malignancy.     Assessment / Plan:    Diagnoses and all orders for this visit:    1. Atypical ductal hyperplasia of breast (Primary)  -     Mammo Diagnostic Digital Tomosynthesis Left With CAD; Future      Doing well. Pathology reviewed. Mammogram of the left in 6 months then follow up with me.     Electronically signed by Delfina Baker MD  10/28/24  09:46 CDT                  "

## 2024-11-07 ENCOUNTER — TELEPHONE (OUTPATIENT)
Dept: SURGERY | Facility: CLINIC | Age: 44
End: 2024-11-07

## 2024-11-07 ENCOUNTER — OFFICE VISIT (OUTPATIENT)
Dept: SURGERY | Facility: CLINIC | Age: 44
End: 2024-11-07
Payer: COMMERCIAL

## 2024-11-07 VITALS
BODY MASS INDEX: 27.11 KG/M2 | DIASTOLIC BLOOD PRESSURE: 84 MMHG | HEIGHT: 63 IN | SYSTOLIC BLOOD PRESSURE: 132 MMHG | WEIGHT: 153 LBS

## 2024-11-07 DIAGNOSIS — N60.99 ATYPICAL DUCTAL HYPERPLASIA OF BREAST: Primary | ICD-10-CM

## 2024-11-07 PROCEDURE — 99024 POSTOP FOLLOW-UP VISIT: CPT

## 2024-11-07 PROCEDURE — 96372 THER/PROPH/DIAG INJ SC/IM: CPT

## 2024-11-07 RX ORDER — CEFTRIAXONE 1 G/1
1 INJECTION, POWDER, FOR SOLUTION INTRAMUSCULAR; INTRAVENOUS ONCE
Status: COMPLETED | OUTPATIENT
Start: 2024-11-07 | End: 2024-11-07

## 2024-11-07 RX ORDER — DOXYCYCLINE 100 MG/1
100 CAPSULE ORAL 2 TIMES DAILY
Qty: 14 CAPSULE | Refills: 0 | Status: SHIPPED | OUTPATIENT
Start: 2024-11-07

## 2024-11-07 RX ADMIN — CEFTRIAXONE 1 G: 1 INJECTION, POWDER, FOR SOLUTION INTRAMUSCULAR; INTRAVENOUS at 11:31

## 2024-11-07 NOTE — PROGRESS NOTES
"Patient: Gloria Melgoza    YOB: 1980    Date: 11/07/2024    Primary Care Provider: Roberto Guillaume MD    Vital Signs:   Vitals:    11/07/24 1106   BP: 132/84   BP Location: Left arm   Patient Position: Sitting   Cuff Size: Adult   Weight: 69.4 kg (153 lb)   Height: 159 cm (62.6\")       Ms. Melgoza is a 44-year-old female who presents to the clinic 3 weeks status post left breast lumpectomy x 2.  She states that over the weekend her superior incision opened up a little bit and began draining what she describes as a brown and bloody fluid.  She states that this went on over the weekend and on Tuesday stopped.  She has noticed swelling and redness over the area where it was draining.  She denies fevers, nausea, vomiting.    Assessment / Plan:    Diagnoses and all orders for this visit:    1. Atypical ductal hyperplasia of breast (Primary)  -     cefTRIAXone (ROCEPHIN) injection 1 g      Upon physical exam, Gloria Melgoza is in need of drainage of the left lumpectomy site. After discussion of the procedure, as well as risks (including bleeding, infection, and damage to adjacent structures) and benefits, the patient elects to proceed. The left lumpectomy site was prepped with iodine. 3 ccs of lidocaine were used to anesthetize the area. An 18 gauge and 20cc syringe were inserted in the area and 36 mLs of purulent fluid were drained from the area. The syringe was removed and a bandage was placed over the area.  The patient was given a IM Rocephin injection.  The patient tolerated the procedure well. I have instructed her that it is important to keep compression over the area for the next week until she is seen in clinic again. She will call for a sooner appointment if she notices significant fluid collecting again.       Follow up:     Return in about 1 week (around 11/14/2024) for Recheck.        Electronically signed by Danni Davidson PA-C  11/07/24  11:41 CST                              "

## 2024-11-07 NOTE — TELEPHONE ENCOUNTER
Called and left message for patient to let her know that Danni called her in some doxy as well. Asked her to call office to confirm she received.

## 2024-11-13 ENCOUNTER — OFFICE VISIT (OUTPATIENT)
Dept: SURGERY | Facility: CLINIC | Age: 44
End: 2024-11-13
Payer: COMMERCIAL

## 2024-11-13 ENCOUNTER — HOSPITAL ENCOUNTER (OUTPATIENT)
Dept: ULTRASOUND IMAGING | Facility: HOSPITAL | Age: 44
Discharge: HOME | End: 2024-11-13
Payer: COMMERCIAL

## 2024-11-13 VITALS
HEART RATE: 74 BPM | WEIGHT: 153 LBS | BODY MASS INDEX: 27.11 KG/M2 | SYSTOLIC BLOOD PRESSURE: 128 MMHG | HEIGHT: 63 IN | DIASTOLIC BLOOD PRESSURE: 88 MMHG | OXYGEN SATURATION: 98 %

## 2024-11-13 DIAGNOSIS — N60.99 ATYPICAL DUCTAL HYPERPLASIA OF BREAST: ICD-10-CM

## 2024-11-13 DIAGNOSIS — N60.99 ATYPICAL DUCTAL HYPERPLASIA OF BREAST: Primary | ICD-10-CM

## 2024-11-13 PROCEDURE — 87070 CULTURE OTHR SPECIMN AEROBIC: CPT

## 2024-11-13 PROCEDURE — 87205 SMEAR GRAM STAIN: CPT

## 2024-11-13 PROCEDURE — 75989 ABSCESS DRAINAGE UNDER X-RAY: CPT

## 2024-11-13 PROCEDURE — 87186 SC STD MICRODIL/AGAR DIL: CPT

## 2024-11-13 PROCEDURE — 87077 CULTURE AEROBIC IDENTIFY: CPT

## 2024-11-13 PROCEDURE — 76642 ULTRASOUND BREAST LIMITED: CPT

## 2024-11-13 PROCEDURE — 99024 POSTOP FOLLOW-UP VISIT: CPT

## 2024-11-13 NOTE — PROGRESS NOTES
"Patient: Gloria Melgoza    YOB: 1980    Date: 11/13/2024    Primary Care Provider: Roberto Guillaume MD    Vital Signs:   Vitals:    11/13/24 1055   BP: 128/88   Pulse: 74   SpO2: 98%   Weight: 69.4 kg (153 lb)   Height: 159 cm (62.6\")       The patient is tolerating a regular diet and has no complaints s/p left lumpectomy x2 on 10/18/24 by Dr. Baker.  At her last visit, it appeared that the patient had a seroma so I attempted aspiration.  Upon aspiration pus was noted.  The patient was given a Rocephin IM injection and was also placed on oral doxycycline.  Patient states that last night the area busted open and more puslike material drained from the area.  She denies fevers, nausea, vomiting.  She has 1 more day of antibiotics left.  She states that she feels like the area is filled up with fluid again.      Assessment / Plan:    Diagnoses and all orders for this visit:    1. Atypical ductal hyperplasia of breast (Primary)  -     US Breast Left Limited; Future        Gloria Melgoza is a 44 y.o. female who presents to the clinic for weeks s/p left lumpectomy. She is overall doing well at this time. The incision is healing well and there is no surrounding erythema.  I attempted aspiration today in office and was unable to aspirate anything.  I have recommended a left breast ultrasound with possible drain placement if there is a drainable fluid collection present.  I have spoke with radiology who is facilitating this.  She will follow-up in 1 week for recheck of the area.  I instructed the patient to call for an appointment if she has any new problems or concerns. She voiced understanding and is agreeable to the plan.    Follow up:     Return in about 1 week (around 11/20/2024) for Recheck.        Electronically signed by Danni Davidson PA-C  11/13/24  13:42 CST                      op  "

## 2024-11-17 LAB
BACTERIA FLD CULT: ABNORMAL
BACTERIA FLD CULT: NORMAL
GRAM STN SPEC: ABNORMAL
GRAM STN SPEC: ABNORMAL
GRAM STN SPEC: NORMAL
GRAM STN SPEC: NORMAL

## 2024-11-20 ENCOUNTER — OFFICE VISIT (OUTPATIENT)
Dept: SURGERY | Facility: CLINIC | Age: 44
End: 2024-11-20
Payer: COMMERCIAL

## 2024-11-20 VITALS
HEIGHT: 63 IN | BODY MASS INDEX: 27.11 KG/M2 | DIASTOLIC BLOOD PRESSURE: 89 MMHG | SYSTOLIC BLOOD PRESSURE: 133 MMHG | WEIGHT: 153 LBS

## 2024-11-20 DIAGNOSIS — Z98.890 S/P LUMPECTOMY, LEFT BREAST: Primary | ICD-10-CM

## 2024-11-20 DIAGNOSIS — N60.99 ATYPICAL DUCTAL HYPERPLASIA OF BREAST: ICD-10-CM

## 2024-11-20 PROCEDURE — 99024 POSTOP FOLLOW-UP VISIT: CPT

## 2024-11-20 NOTE — PROGRESS NOTES
"Patient: Gloria Melgoza    YOB: 1980    Date: 11/20/2024    Primary Care Provider: Roberto Guillaume MD    Vital Signs:   Vitals:    11/20/24 1054   BP: 133/89   BP Location: Left arm   Patient Position: Sitting   Cuff Size: Adult   Weight: 69.4 kg (153 lb)   Height: 159 cm (62.6\")       The patient is tolerating a regular diet and has no complaints s/p left breast  lumpectomy x2 on 10/18/24 by Dr. Baker. The patient denies fevers, chills, nausea, vomiting, and excessive pain. Last week after continued purulent drainage from her incision, 2 drains were placed where 2 fluid collections were found. She states that she has not had any purulent drainage present in the drains since having them placed. She endorses pain with them.     Results Review:   I reviewed the patient's new clinical results.    Body Fluid Culture - Body Fluid, Breast, Left Central (11/13/2024 15:45)   Body Fluid Culture   Lab   Rare growth Serratia marcescens Abnormal  [facility]            Gram Stain  Lab   Many (4+) WBCs per low power field  [facility]   No organisms seen  [facility]           Susceptibility     Serratia marcescens     [facility]     Amoxicillin + Clavulanate >=32 ug/ml Resistant     Cefazolin (Non Urine) >=32 ug/ml Resistant     Cefepime <=0.12 ug/ml Susceptible     Ceftazidime <=0.5 ug/ml Susceptible     Ceftriaxone <=0.25 ug/ml Susceptible     Gentamicin <=1 ug/ml Susceptible     Levofloxacin <=0.12 ug/ml Susceptible     Piperacillin + Tazobactam 1.0 ug/ml Susceptible 1     Trimethoprim + Sulfamethoxazole <=20 ug/ml Susceptible           Assessment / Plan:    Diagnoses and all orders for this visit:    1. S/P lumpectomy, left breast (Primary)    2. Atypical ductal hyperplasia of breast        Gloria Melgoza is a 44 y.o. female who presents to the clinic 4 weeks s/p left lumpectomy x2. She is overall doing well at this time. The incision is healing well. Upon palpation, the area of previous firmness has " resolved and there is no purulent drainage present. I have recommended removing both drains because of this. At this time, she will follow up in office in 1 month with me for recheck. She will be due for mammogram at 6 months post op. I instructed the patient to call for an appointment if she has any new problems or concerns. She voiced understanding and is agreeable to the plan.    Follow up:     Return in about 1 month (around 12/20/2024) for 2 month post op L lumpectomy.        Electronically signed by Danni Davidson PA-C  11/20/24  16:08 CST

## 2024-12-09 ENCOUNTER — TELEPHONE (OUTPATIENT)
Dept: INTERVENTIONAL RADIOLOGY/VASCULAR | Facility: HOSPITAL | Age: 44
End: 2024-12-09

## 2024-12-18 ENCOUNTER — APPOINTMENT (OUTPATIENT)
Dept: CT IMAGING | Facility: HOSPITAL | Age: 44
End: 2024-12-18
Payer: COMMERCIAL

## 2024-12-18 ENCOUNTER — HOSPITAL ENCOUNTER (INPATIENT)
Facility: HOSPITAL | Age: 44
LOS: 5 days | Discharge: HOME | End: 2024-12-23
Attending: EMERGENCY MEDICINE | Admitting: FAMILY MEDICINE
Payer: COMMERCIAL

## 2024-12-18 DIAGNOSIS — K57.92 DIVERTICULITIS: Primary | ICD-10-CM

## 2024-12-18 LAB
ALBUMIN SERPL-MCNC: 3.8 G/DL (ref 3.5–5.2)
ALBUMIN/GLOB SERPL: 1.4 G/DL
ALP SERPL-CCNC: 96 U/L (ref 39–117)
ALT SERPL W P-5'-P-CCNC: 12 U/L (ref 1–33)
ANION GAP SERPL CALCULATED.3IONS-SCNC: 12 MMOL/L (ref 5–15)
AST SERPL-CCNC: 12 U/L (ref 1–32)
BASOPHILS # BLD AUTO: 0.04 10*3/MM3 (ref 0–0.2)
BASOPHILS NFR BLD AUTO: 0.4 % (ref 0–1.5)
BILIRUB SERPL-MCNC: 0.7 MG/DL (ref 0–1.2)
BILIRUB UR QL STRIP: NEGATIVE
BUN SERPL-MCNC: 8 MG/DL (ref 6–20)
BUN/CREAT SERPL: 11.6 (ref 7–25)
CALCIUM SPEC-SCNC: 8.5 MG/DL (ref 8.6–10.5)
CHLORIDE SERPL-SCNC: 110 MMOL/L (ref 98–107)
CLARITY UR: CLEAR
CO2 SERPL-SCNC: 17 MMOL/L (ref 22–29)
COLOR UR: YELLOW
CREAT SERPL-MCNC: 0.69 MG/DL (ref 0.57–1)
DEPRECATED RDW RBC AUTO: 41.5 FL (ref 37–54)
EGFRCR SERPLBLD CKD-EPI 2021: 109.9 ML/MIN/1.73
EOSINOPHIL # BLD AUTO: 0.11 10*3/MM3 (ref 0–0.4)
EOSINOPHIL NFR BLD AUTO: 1 % (ref 0.3–6.2)
ERYTHROCYTE [DISTWIDTH] IN BLOOD BY AUTOMATED COUNT: 12.6 % (ref 12.3–15.4)
GLOBULIN UR ELPH-MCNC: 2.7 GM/DL
GLUCOSE SERPL-MCNC: 98 MG/DL (ref 65–99)
GLUCOSE UR STRIP-MCNC: NEGATIVE MG/DL
HCG SERPL QL: NEGATIVE
HCT VFR BLD AUTO: 39.1 % (ref 34–46.6)
HGB BLD-MCNC: 12.9 G/DL (ref 12–15.9)
HGB UR QL STRIP.AUTO: NEGATIVE
IMM GRANULOCYTES # BLD AUTO: 0.04 10*3/MM3 (ref 0–0.05)
IMM GRANULOCYTES NFR BLD AUTO: 0.4 % (ref 0–0.5)
INR PPP: 1.06 (ref 0.91–1.09)
KETONES UR QL STRIP: NEGATIVE
LEUKOCYTE ESTERASE UR QL STRIP.AUTO: NEGATIVE
LYMPHOCYTES # BLD AUTO: 1.35 10*3/MM3 (ref 0.7–3.1)
LYMPHOCYTES NFR BLD AUTO: 12 % (ref 19.6–45.3)
MAGNESIUM SERPL-MCNC: 1.9 MG/DL (ref 1.6–2.6)
MCH RBC QN AUTO: 29.8 PG (ref 26.6–33)
MCHC RBC AUTO-ENTMCNC: 33 G/DL (ref 31.5–35.7)
MCV RBC AUTO: 90.3 FL (ref 79–97)
MONOCYTES # BLD AUTO: 1.11 10*3/MM3 (ref 0.1–0.9)
MONOCYTES NFR BLD AUTO: 9.8 % (ref 5–12)
NEUTROPHILS NFR BLD AUTO: 76.4 % (ref 42.7–76)
NEUTROPHILS NFR BLD AUTO: 8.62 10*3/MM3 (ref 1.7–7)
NITRITE UR QL STRIP: NEGATIVE
NRBC BLD AUTO-RTO: 0 /100 WBC (ref 0–0.2)
PH UR STRIP.AUTO: 6.5 [PH] (ref 5–8)
PLATELET # BLD AUTO: 233 10*3/MM3 (ref 140–450)
PMV BLD AUTO: 11 FL (ref 6–12)
POTASSIUM SERPL-SCNC: 3.7 MMOL/L (ref 3.5–5.2)
PROT SERPL-MCNC: 6.5 G/DL (ref 6–8.5)
PROT UR QL STRIP: NEGATIVE
PROTHROMBIN TIME: 14.3 SECONDS (ref 11.8–14.8)
RBC # BLD AUTO: 4.33 10*6/MM3 (ref 3.77–5.28)
SODIUM SERPL-SCNC: 139 MMOL/L (ref 136–145)
SP GR UR STRIP: 1.02 (ref 1–1.03)
UROBILINOGEN UR QL STRIP: NORMAL
WBC NRBC COR # BLD AUTO: 11.27 10*3/MM3 (ref 3.4–10.8)

## 2024-12-18 PROCEDURE — 25010000002 HYDROMORPHONE PER 4 MG: Performed by: FAMILY MEDICINE

## 2024-12-18 PROCEDURE — 84703 CHORIONIC GONADOTROPIN ASSAY: CPT | Performed by: EMERGENCY MEDICINE

## 2024-12-18 PROCEDURE — 74177 CT ABD & PELVIS W/CONTRAST: CPT

## 2024-12-18 PROCEDURE — 85025 COMPLETE CBC W/AUTO DIFF WBC: CPT | Performed by: EMERGENCY MEDICINE

## 2024-12-18 PROCEDURE — 25010000002 ENOXAPARIN PER 10 MG: Performed by: FAMILY MEDICINE

## 2024-12-18 PROCEDURE — 25010000002 HYDROMORPHONE PER 4 MG: Performed by: EMERGENCY MEDICINE

## 2024-12-18 PROCEDURE — 81003 URINALYSIS AUTO W/O SCOPE: CPT | Performed by: EMERGENCY MEDICINE

## 2024-12-18 PROCEDURE — 36415 COLL VENOUS BLD VENIPUNCTURE: CPT

## 2024-12-18 PROCEDURE — 25010000002 PIPERACILLIN SOD-TAZOBACTAM PER 1 G: Performed by: EMERGENCY MEDICINE

## 2024-12-18 PROCEDURE — 25010000002 ONDANSETRON PER 1 MG: Performed by: EMERGENCY MEDICINE

## 2024-12-18 PROCEDURE — 25010000002 ONDANSETRON PER 1 MG: Performed by: FAMILY MEDICINE

## 2024-12-18 PROCEDURE — 85610 PROTHROMBIN TIME: CPT | Performed by: EMERGENCY MEDICINE

## 2024-12-18 PROCEDURE — 87040 BLOOD CULTURE FOR BACTERIA: CPT | Performed by: EMERGENCY MEDICINE

## 2024-12-18 PROCEDURE — 25510000001 IOPAMIDOL 61 % SOLUTION: Performed by: EMERGENCY MEDICINE

## 2024-12-18 PROCEDURE — 25010000002 PIPERACILLIN SOD-TAZOBACTAM PER 1 G: Performed by: FAMILY MEDICINE

## 2024-12-18 PROCEDURE — 25810000003 SODIUM CHLORIDE 0.9 % SOLUTION: Performed by: FAMILY MEDICINE

## 2024-12-18 PROCEDURE — 83735 ASSAY OF MAGNESIUM: CPT | Performed by: EMERGENCY MEDICINE

## 2024-12-18 PROCEDURE — 25810000003 LACTATED RINGERS SOLUTION: Performed by: EMERGENCY MEDICINE

## 2024-12-18 PROCEDURE — 99285 EMERGENCY DEPT VISIT HI MDM: CPT

## 2024-12-18 PROCEDURE — 80053 COMPREHEN METABOLIC PANEL: CPT | Performed by: EMERGENCY MEDICINE

## 2024-12-18 RX ORDER — ONDANSETRON 2 MG/ML
4 INJECTION INTRAMUSCULAR; INTRAVENOUS ONCE
Status: COMPLETED | OUTPATIENT
Start: 2024-12-18 | End: 2024-12-18

## 2024-12-18 RX ORDER — HYDROMORPHONE HYDROCHLORIDE 1 MG/ML
0.5 INJECTION, SOLUTION INTRAMUSCULAR; INTRAVENOUS; SUBCUTANEOUS EVERY 4 HOURS PRN
Status: DISCONTINUED | OUTPATIENT
Start: 2024-12-18 | End: 2024-12-23 | Stop reason: HOSPADM

## 2024-12-18 RX ORDER — SODIUM CHLORIDE 9 MG/ML
40 INJECTION, SOLUTION INTRAVENOUS AS NEEDED
Status: DISCONTINUED | OUTPATIENT
Start: 2024-12-18 | End: 2024-12-23 | Stop reason: HOSPADM

## 2024-12-18 RX ORDER — ZOLPIDEM TARTRATE 5 MG/1
5 TABLET ORAL NIGHTLY PRN
Status: DISCONTINUED | OUTPATIENT
Start: 2024-12-18 | End: 2024-12-23 | Stop reason: HOSPADM

## 2024-12-18 RX ORDER — TOPIRAMATE 100 MG/1
100 TABLET, FILM COATED ORAL DAILY
Status: DISCONTINUED | OUTPATIENT
Start: 2024-12-18 | End: 2024-12-23 | Stop reason: HOSPADM

## 2024-12-18 RX ORDER — ROSUVASTATIN CALCIUM 10 MG/1
10 TABLET, COATED ORAL NIGHTLY
Status: DISCONTINUED | OUTPATIENT
Start: 2024-12-18 | End: 2024-12-23 | Stop reason: HOSPADM

## 2024-12-18 RX ORDER — PROPRANOLOL HYDROCHLORIDE 60 MG/1
120 CAPSULE, EXTENDED RELEASE ORAL NIGHTLY
Status: DISCONTINUED | OUTPATIENT
Start: 2024-12-18 | End: 2024-12-23 | Stop reason: HOSPADM

## 2024-12-18 RX ORDER — SODIUM CHLORIDE 0.9 % (FLUSH) 0.9 %
10 SYRINGE (ML) INJECTION AS NEEDED
Status: DISCONTINUED | OUTPATIENT
Start: 2024-12-18 | End: 2024-12-23 | Stop reason: HOSPADM

## 2024-12-18 RX ORDER — SODIUM CHLORIDE 9 MG/ML
100 INJECTION, SOLUTION INTRAVENOUS CONTINUOUS
Status: DISPENSED | OUTPATIENT
Start: 2024-12-18 | End: 2024-12-19

## 2024-12-18 RX ORDER — AMOXICILLIN 250 MG
2 CAPSULE ORAL 2 TIMES DAILY PRN
Status: DISCONTINUED | OUTPATIENT
Start: 2024-12-18 | End: 2024-12-23 | Stop reason: HOSPADM

## 2024-12-18 RX ORDER — ENOXAPARIN SODIUM 100 MG/ML
40 INJECTION SUBCUTANEOUS DAILY
Status: DISCONTINUED | OUTPATIENT
Start: 2024-12-18 | End: 2024-12-23 | Stop reason: HOSPADM

## 2024-12-18 RX ORDER — ACETAMINOPHEN 500 MG
500 TABLET ORAL EVERY 6 HOURS PRN
Status: DISCONTINUED | OUTPATIENT
Start: 2024-12-18 | End: 2024-12-23 | Stop reason: HOSPADM

## 2024-12-18 RX ORDER — SODIUM CHLORIDE 0.9 % (FLUSH) 0.9 %
10 SYRINGE (ML) INJECTION EVERY 12 HOURS SCHEDULED
Status: DISCONTINUED | OUTPATIENT
Start: 2024-12-18 | End: 2024-12-23 | Stop reason: HOSPADM

## 2024-12-18 RX ORDER — ONDANSETRON 4 MG/1
8 TABLET, ORALLY DISINTEGRATING ORAL EVERY 8 HOURS PRN
Status: DISCONTINUED | OUTPATIENT
Start: 2024-12-18 | End: 2024-12-23 | Stop reason: HOSPADM

## 2024-12-18 RX ORDER — IBUPROFEN 200 MG
800 TABLET ORAL EVERY 8 HOURS PRN
COMMUNITY

## 2024-12-18 RX ORDER — ACETAMINOPHEN 325 MG/1
975 TABLET ORAL EVERY 8 HOURS
Status: DISCONTINUED | OUTPATIENT
Start: 2024-12-18 | End: 2024-12-18

## 2024-12-18 RX ORDER — AZELASTINE 1 MG/ML
2 SPRAY, METERED NASAL 2 TIMES DAILY
Status: DISCONTINUED | OUTPATIENT
Start: 2024-12-18 | End: 2024-12-23 | Stop reason: HOSPADM

## 2024-12-18 RX ORDER — ROSUVASTATIN CALCIUM 20 MG/1
20 TABLET, COATED ORAL DAILY
Status: DISCONTINUED | OUTPATIENT
Start: 2024-12-18 | End: 2024-12-18

## 2024-12-18 RX ORDER — ROPINIROLE 1 MG/1
1 TABLET, FILM COATED ORAL NIGHTLY
Status: DISCONTINUED | OUTPATIENT
Start: 2024-12-18 | End: 2024-12-23 | Stop reason: HOSPADM

## 2024-12-18 RX ORDER — FLUTICASONE PROPIONATE 50 MCG
1 SPRAY, SUSPENSION (ML) NASAL DAILY
Status: DISCONTINUED | OUTPATIENT
Start: 2024-12-18 | End: 2024-12-23 | Stop reason: HOSPADM

## 2024-12-18 RX ORDER — IOPAMIDOL 612 MG/ML
100 INJECTION, SOLUTION INTRAVASCULAR
Status: COMPLETED | OUTPATIENT
Start: 2024-12-18 | End: 2024-12-18

## 2024-12-18 RX ORDER — MULTIPLE VITAMINS W/ MINERALS TAB 9MG-400MCG
1 TAB ORAL DAILY
COMMUNITY

## 2024-12-18 RX ORDER — POLYETHYLENE GLYCOL 3350 17 G/17G
17 POWDER, FOR SOLUTION ORAL DAILY PRN
Status: DISCONTINUED | OUTPATIENT
Start: 2024-12-18 | End: 2024-12-23 | Stop reason: HOSPADM

## 2024-12-18 RX ORDER — BISACODYL 5 MG/1
5 TABLET, DELAYED RELEASE ORAL DAILY PRN
Status: DISCONTINUED | OUTPATIENT
Start: 2024-12-18 | End: 2024-12-23 | Stop reason: HOSPADM

## 2024-12-18 RX ORDER — HYDROMORPHONE HYDROCHLORIDE 1 MG/ML
0.5 INJECTION, SOLUTION INTRAMUSCULAR; INTRAVENOUS; SUBCUTANEOUS ONCE
Status: COMPLETED | OUTPATIENT
Start: 2024-12-18 | End: 2024-12-18

## 2024-12-18 RX ORDER — BISACODYL 10 MG
10 SUPPOSITORY, RECTAL RECTAL DAILY PRN
Status: DISCONTINUED | OUTPATIENT
Start: 2024-12-18 | End: 2024-12-23 | Stop reason: HOSPADM

## 2024-12-18 RX ADMIN — ROSUVASTATIN 10 MG: 10 TABLET, FILM COATED ORAL at 20:14

## 2024-12-18 RX ADMIN — ONDANSETRON HYDROCHLORIDE 8 MG: 2 INJECTION INTRAMUSCULAR; INTRAVENOUS at 14:28

## 2024-12-18 RX ADMIN — PIPERACILLIN SODIUM AND TAZOBACTAM SODIUM 3.38 G: 3; .375 INJECTION, POWDER, LYOPHILIZED, FOR SOLUTION INTRAVENOUS at 16:14

## 2024-12-18 RX ADMIN — ROPINIROLE HYDROCHLORIDE 1 MG: 1 TABLET, FILM COATED ORAL at 20:14

## 2024-12-18 RX ADMIN — IOPAMIDOL 100 ML: 612 INJECTION, SOLUTION INTRAVENOUS at 08:57

## 2024-12-18 RX ADMIN — HYDROMORPHONE HYDROCHLORIDE 0.5 MG: 1 INJECTION, SOLUTION INTRAMUSCULAR; INTRAVENOUS; SUBCUTANEOUS at 23:49

## 2024-12-18 RX ADMIN — PROPRANOLOL HYDROCHLORIDE 120 MG: 60 CAPSULE, EXTENDED RELEASE ORAL at 20:14

## 2024-12-18 RX ADMIN — AZELASTINE HYDROCHLORIDE 2 SPRAY: 137 SPRAY, METERED NASAL at 20:15

## 2024-12-18 RX ADMIN — ENOXAPARIN SODIUM 40 MG: 100 INJECTION SUBCUTANEOUS at 16:14

## 2024-12-18 RX ADMIN — HYDROMORPHONE HYDROCHLORIDE 0.5 MG: 1 INJECTION, SOLUTION INTRAMUSCULAR; INTRAVENOUS; SUBCUTANEOUS at 20:13

## 2024-12-18 RX ADMIN — HYDROMORPHONE HYDROCHLORIDE 0.5 MG: 1 INJECTION, SOLUTION INTRAMUSCULAR; INTRAVENOUS; SUBCUTANEOUS at 11:52

## 2024-12-18 RX ADMIN — SODIUM CHLORIDE 100 ML/HR: 9 INJECTION, SOLUTION INTRAVENOUS at 12:41

## 2024-12-18 RX ADMIN — SODIUM CHLORIDE 100 ML/HR: 9 INJECTION, SOLUTION INTRAVENOUS at 23:43

## 2024-12-18 RX ADMIN — PIPERACILLIN SODIUM AND TAZOBACTAM SODIUM 3.38 G: 3; .375 INJECTION, POWDER, LYOPHILIZED, FOR SOLUTION INTRAVENOUS at 09:59

## 2024-12-18 RX ADMIN — ONDANSETRON HYDROCHLORIDE 8 MG: 2 INJECTION INTRAMUSCULAR; INTRAVENOUS at 20:14

## 2024-12-18 RX ADMIN — HYDROMORPHONE HYDROCHLORIDE 0.5 MG: 1 INJECTION, SOLUTION INTRAMUSCULAR; INTRAVENOUS; SUBCUTANEOUS at 08:23

## 2024-12-18 RX ADMIN — ONDANSETRON 4 MG: 2 INJECTION INTRAMUSCULAR; INTRAVENOUS at 08:20

## 2024-12-18 RX ADMIN — SODIUM CHLORIDE, POTASSIUM CHLORIDE, SODIUM LACTATE AND CALCIUM CHLORIDE 1000 ML: 600; 310; 30; 20 INJECTION, SOLUTION INTRAVENOUS at 08:05

## 2024-12-18 RX ADMIN — PIPERACILLIN SODIUM AND TAZOBACTAM SODIUM 3.38 G: 3; .375 INJECTION, POWDER, LYOPHILIZED, FOR SOLUTION INTRAVENOUS at 23:42

## 2024-12-18 RX ADMIN — HYDROMORPHONE HYDROCHLORIDE 0.5 MG: 1 INJECTION, SOLUTION INTRAMUSCULAR; INTRAVENOUS; SUBCUTANEOUS at 16:14

## 2024-12-18 RX ADMIN — TOPIRAMATE 100 MG: 100 TABLET, FILM COATED ORAL at 20:14

## 2024-12-18 RX ADMIN — Medication 10 ML: at 20:13

## 2024-12-18 NOTE — H&P
History and Physical    Patient:  Gloria Melgoza  MRN: [id number]    CHIEF COMPLAINT: Left lower quadrant abdominal pain    History Obtained From: the patient   PCP: Roberto Guillaume MD    HISTORY OF PRESENT ILLNESS:   The patient is a 44 y.o. female who presents with 2 to 3-day history of left lower quadrant abdominal pain fever chills and overall malaise.  Further workup in the emergency department reveals acute diverticulitis without evidence of perforation.  Given her degree of pain and discomfort she has been to for IV analgesia IV fluid resuscitation and IV antibiotics.    REVIEW OF SYSTEMS:    Review of Systems   Constitutional:  Positive for activity change, chills and fever.   HENT: Negative.     Respiratory: Negative.     Cardiovascular: Negative.    Gastrointestinal:  Positive for abdominal pain, blood in stool and nausea.   Genitourinary: Negative.    Musculoskeletal: Negative.    Hematological: Negative.    Psychiatric/Behavioral: Negative.            Past Medical History:  Past Medical History:   Diagnosis Date    Asthma     Increasing shortness of breath in revent months    Dental disease     Almost all top teeth are crowns    Dizziness     Always attributed to blood pressure    GERD (gastroesophageal reflux disease)     Headache     Hyperlipidemia     Insomnia     Migraine     Muscle spasm of right shoulder     Nosebleed     Sinusitis     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       Past Surgical History:  Past Surgical History:   Procedure Laterality Date    BREAST BIOPSY Right     benign    BREAST LUMPECTOMY WITH SENTINEL NODE BIOPSY Left 10/18/2024    Procedure: LEFT BREAST LUMPECTOMY WITH MAG SEED GUIDANCE x 2;  Surgeon: Delfina Baker MD;  Location: [facility];  Service: General;  Laterality: Left;    CARPAL TUNNEL RELEASE Bilateral      SECTION      COLONOSCOPY      ENDOMETRIAL ABLATION Bilateral 2018    tubal ligtion at same time    ENDOSCOPIC  FUNCTIONAL SINUS SURGERY (FESS) Bilateral 02/23/2024    Procedure: Bilateral functional endoscopic anterior ethmoidectomy with bilateral middle meatal antrostomy, Septoplasty, and Bilateral inferior turbinate reduction via Coblation;  Surgeon: Jadiel Smith MD;  Location: [facility];  Service: ENT;  Laterality: Bilateral;    LAPAROSCOPIC TUBAL LIGATION      SINUS SURGERY  2/23/24       Medications Prior to Admission:    Facility-Administered Medications Prior to Admission   Medication Dose Route Frequency Provider Last Rate Last Admin    lidocaine (XYLOCAINE) 1 % injection 10 mL  10 mL Subcutaneous Once Leonela Banks PA        lidocaine (XYLOCAINE) 1 % injection 10 mL  10 mL Subcutaneous Once Delfina Baker MD        lidocaine 1% - EPINEPHrine 1:707300 (XYLOCAINE W/EPI) 1 %-1:821035 injection 10 mL  10 mL Injection Once Leonela Banks PA         Medications Prior to Admission   Medication Sig Dispense Refill Last Dose/Taking    Azelastine HCl 137 MCG/SPRAY solution Use 1 spray into the nostril(s) as directed by provider 2 (Two) Times a Day. 30 mL 2 12/18/2024    fluticasone (FLONASE) 50 MCG/ACT nasal spray 1 spray into the nostril(s) as directed by provider. 16 g 1 12/18/2024    ibuprofen (Motrin IB) 200 MG tablet Take 3 tablets by mouth Every 8 (Eight) Hours. Take every 8 hours for three days then take as needed.   12/17/2024    ondansetron (Zofran) 4 MG tablet Take 1 tablet by mouth Every 8 (Eight) Hours As Needed for Nausea or Vomiting. 15 tablet 0 12/18/2024    propranolol LA (INDERAL LA) 120 MG 24 hr capsule Take 1 capsule by mouth Daily. (Patient taking differently: Take 1 capsule by mouth Every Night.) 30 capsule 3 12/17/2024    rOPINIRole (REQUIP) 1 MG tablet Take 1 tablet by mouth every night at bedtime. Take 1-3 hour before bedtime. 90 tablet 3 12/17/2024    rosuvastatin (CRESTOR) 10 MG tablet Take 1 tablet by mouth every night at bedtime. 30 tablet 2 12/17/2024    tiZANidine  (ZANAFLEX) 4 MG tablet Take 1 tablet by mouth at bedtime as needed for muscle spasms. 30 tablet 2 12/17/2024    topiramate (TOPAMAX) 100 MG tablet Take 1 tablet by mouth 2 (Two) Times a Day. (Patient taking differently: Take 1 tablet by mouth Daily.) 180 tablet 3 Patient Taking Differently    topiramate (TOPAMAX) 50 MG tablet Take 1 tablet by mouth twice daily. 60 tablet 6 12/17/2024    zolpidem (Ambien) 10 MG tablet Take 1 tablet by mouth At Night As Needed---do not take with lunesta 30 tablet 0 12/17/2024    acetaminophen (Tylenol) 325 MG tablet Take 3 tablets by mouth Every 8 (Eight) Hours. Take every 8 hours for 3 days then take prn as needed.       doxycycline (VIBRAMYCIN) 100 MG capsule Take 1 capsule by mouth 2 (Two) Times a Day. 14 capsule 0     multivitamin with minerals tablet tablet Take 1 tablet by mouth Daily.       rosuvastatin (CRESTOR) 20 MG tablet Take 1 tablet by mouth Daily. 90 tablet 3     tiZANidine (ZANAFLEX) 4 MG tablet Take 1 tablet by mouth at bedtime as needed for muscle spasms. 30 tablet 2        Allergies:  Sulfa antibiotics and Wound dressing adhesive    Social History:   Social History     Socioeconomic History    Marital status:    Tobacco Use    Smoking status: Former     Current packs/day: 0.50     Average packs/day: 0.5 packs/day for 20.0 years (10.0 ttl pk-yrs)     Types: Cigarettes    Smokeless tobacco: Never    Tobacco comments:     Doesn’t smoke cigarettes but have started using vape   Vaping Use    Vaping status: Every Day    Substances: Nicotine, Flavoring   Substance and Sexual Activity    Alcohol use: Yes     Alcohol/week: 2.0 standard drinks of alcohol     Types: 2 Glasses of wine per week    Drug use: Never    Sexual activity: Defer       Family History:   Family History   Problem Relation Age of Onset    Seizures Mother     Migraines Mother     Cancer Mother         Pre cancerous lesions in the colon    Hypertension Father     Diabetes Father     Breast cancer  "Paternal Grandmother     Cancer Paternal Grandmother         Breast and Colon Cancer    Cancer Maternal Grandmother         Bladder    Cancer Paternal Grandfather         Lung    Cancer Paternal Uncle         Bladder           Physical Exam:    Vitals: /76 (BP Location: Right arm, Patient Position: Lying)   Pulse 72   Temp 98 °F (36.7 °C) (Oral)   Resp 18   Ht 157.5 cm (62\")   Wt 69.6 kg (153 lb 8 oz)   SpO2 100%   BMI 28.08 kg/m²   Physical Exam  Vitals and nursing note reviewed.   Constitutional:       Appearance: Normal appearance.   HENT:      Head: Normocephalic and atraumatic.      Nose: Nose normal.      Mouth/Throat:      Mouth: Mucous membranes are moist.   Eyes:      Pupils: Pupils are equal, round, and reactive to light.   Cardiovascular:      Rate and Rhythm: Normal rate and regular rhythm.      Pulses: Normal pulses.      Heart sounds: Normal heart sounds.   Pulmonary:      Effort: Pulmonary effort is normal.      Breath sounds: Normal breath sounds.   Abdominal:      General: Abdomen is flat. Bowel sounds are normal.      Tenderness: There is abdominal tenderness.   Skin:     General: Skin is warm.      Capillary Refill: Capillary refill takes less than 2 seconds.   Neurological:      General: No focal deficit present.      Mental Status: She is alert.           Lab Results (last 24 hours)       Procedure Component Value Units Date/Time    Blood Culture - Blood, Arm, Right [[id number]] Collected: 12/18/24 0946    Specimen: Blood from Arm, Right Updated: 12/18/24 1028    Blood Culture - Blood, Arm, Left [[id number]] Collected: 12/18/24 0955    Specimen: Blood from Arm, Left Updated: 12/18/24 1028    Comprehensive Metabolic Panel [[id number]]  (Abnormal) Collected: 12/18/24 0800    Specimen: Blood Updated: 12/18/24 0845     Glucose 98 mg/dL      BUN 8 mg/dL      Creatinine 0.69 mg/dL      Sodium 139 mmol/L      Potassium 3.7 mmol/L      Chloride 110 mmol/L      CO2 17.0 mmol/L      Calcium 8.5 " mg/dL      Total Protein 6.5 g/dL      Albumin 3.8 g/dL      ALT (SGPT) 12 U/L      AST (SGOT) 12 U/L      Alkaline Phosphatase 96 U/L      Total Bilirubin 0.7 mg/dL      Globulin 2.7 gm/dL      A/G Ratio 1.4 g/dL      BUN/Creatinine Ratio 11.6     Anion Gap 12.0 mmol/L      eGFR 109.9 mL/min/1.73     Narrative:      GFR Categories in Chronic Kidney Disease (CKD)      GFR Category          GFR (mL/min/1.73)    Interpretation  G1                     90 or greater         Normal or high (1)  G2                      60-89                Mild decrease (1)  G3a                   45-59                Mild to moderate decrease  G3b                   30-44                Moderate to severe decrease  G4                    15-29                Severe decrease  G5                    14 or less           Kidney failure          (1)In the absence of evidence of kidney disease, neither GFR category G1 or G2 fulfill the criteria for CKD.    eGFR calculation 2021 CKD-EPI creatinine equation, which does not include race as a factor    Magnesium [[id number]]  (Normal) Collected: 12/18/24 0800    Specimen: Blood Updated: 12/18/24 0841     Magnesium 1.9 mg/dL     hCG, Serum, Qualitative [[id number]]  (Normal) Collected: 12/18/24 0800    Specimen: Blood Updated: 12/18/24 0838     HCG Qualitative Negative    Protime-INR [[id number]]  (Normal) Collected: 12/18/24 0800    Specimen: Blood Updated: 12/18/24 0834     Protime 14.3 Seconds      INR 1.06    CBC & Differential [[id number]]  (Abnormal) Collected: 12/18/24 0800    Specimen: Blood Updated: 12/18/24 0828    Narrative:      The following orders were created for panel order CBC & Differential.  Procedure                               Abnormality         Status                     ---------                               -----------         ------                     CBC Auto Differential[[id number]]        Abnormal            Final result                 Please view results for these tests  on the individual orders.    CBC Auto Differential [[id number]]  (Abnormal) Collected: 12/18/24 0800    Specimen: Blood Updated: 12/18/24 0828     WBC 11.27 10*3/mm3      RBC 4.33 10*6/mm3      Hemoglobin 12.9 g/dL      Hematocrit 39.1 %      MCV 90.3 fL      MCH 29.8 pg      MCHC 33.0 g/dL      RDW 12.6 %      RDW-SD 41.5 fl      MPV 11.0 fL      Platelets 233 10*3/mm3      Neutrophil % 76.4 %      Lymphocyte % 12.0 %      Monocyte % 9.8 %      Eosinophil % 1.0 %      Basophil % 0.4 %      Immature Grans % 0.4 %      Neutrophils, Absolute 8.62 10*3/mm3      Lymphocytes, Absolute 1.35 10*3/mm3      Monocytes, Absolute 1.11 10*3/mm3      Eosinophils, Absolute 0.11 10*3/mm3      Basophils, Absolute 0.04 10*3/mm3      Immature Grans, Absolute 0.04 10*3/mm3      nRBC 0.0 /100 WBC     Urinalysis With Culture If Indicated - Urine, Clean Catch [[id number]]  (Normal) Collected: 12/18/24 0804    Specimen: Urine, Clean Catch Updated: 12/18/24 0826     Color, UA Yellow     Appearance, UA Clear     pH, UA 6.5     Specific Gravity, UA 1.016     Glucose, UA Negative     Ketones, UA Negative     Bilirubin, UA Negative     Blood, UA Negative     Protein, UA Negative     Leuk Esterase, UA Negative     Nitrite, UA Negative     Urobilinogen, UA 1.0 E.U./dL    Narrative:      In absence of clinical symptoms, the presence of pyuria, bacteria, and/or nitrites on the urinalysis result does not correlate with infection.  Urine microscopic not indicated.             -----------------------------------------------------------------  EKG: Nonacute  Radiology:     CT Abdomen Pelvis With Contrast    Result Date: 12/18/2024  EXAM/TECHNIQUE: CT abdomen and pelvis with IV contrast  INDICATION: Left lower quadrant abdominal pain  COMPARISON: 12/16/2022  DLP: 233.53 mGy.cm. Automated exposure control was utilized to decrease patient radiation dose.  FINDINGS:  Lung bases are clear.  Stable small low-density liver lesions. No suspicious new or  enlarging liver lesion. No cholelithiasis or biliary ductal dilatation. Pancreas appears normal. Spleen is unremarkable. Stable small bilateral adrenal gland nodules, likely adenomas.  No solid renal mass. No urolithiasis or hydronephrosis. No focal urinary bladder wall thickening.  Marked wall thickening of the distal descending colon with pronounced surrounding fat stranding and a background of diverticulosis. No free air or drainable organized fluid collection/abscess. The appendix appears normal. No small bowel distention or inflammatory change.  No ascites or free pelvic fluid. Multiple uterine fibroids are present including a 4.5 cm posterior uterine body fibroid. Presumed small right ovarian corpus luteal cyst. A few small coarse calcifications are present in the right ovary.  Abdominal aorta is nonaneurysmal. No abdominal or pelvic lymphadenopathy.  No acute soft tissue finding. No aggressive osseous lesion.       1.  Moderate to severe acute diverticulitis involving the distal descending colon. No definite evidence of macro perforation. No organized drainable collection/abscess.  2.  Uterine fibroids.    This report was signed and finalized on 12/18/2024 9:14 AM by Dr. Joseluis Mcarthur MD.        Assessment and Plan     Primary Problem:  Diverticulitis  Leukocytosis    Hospital Problem list:    Diverticulitis      PMH:  Past Medical History:   Diagnosis Date    Asthma     Increasing shortness of breath in revent months    Dental disease     Almost all top teeth are crowns    Dizziness     Always attributed to blood pressure    GERD (gastroesophageal reflux disease)     Headache     Hyperlipidemia     Insomnia     Migraine     Muscle spasm of right shoulder     Nosebleed     Sinusitis     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       Treatment Plan:  IV antibiotics to cover diverticulitis  IV fluid resuscitation  IV analgesia  IV antiemetics  Follow clinical course    Disposition:  Home    Roberto Guillaume MD 12/18/2024 14:25 CST

## 2024-12-18 NOTE — ED PROVIDER NOTES
Subjective   History of Present Illness  44-year-old female presents to the emergency department with complaint of left lower quadrant abdominal pain and nausea.  She has a history of diverticulosis/diverticulitis.    Patient reports onset of nonspecific generalized abdominal pain about 3 days ago.  Pain has been continuous since onset, has been gradually worsening.  Initially felt symptoms may be related to menstrual cramps.  Pain has been moderate to severe since yesterday and has migrated to the left lower quadrant which cause concern for diverticulitis so patient came to the ED for further evaluation.  She reports low-grade fever yesterday.  She has had nausea and anorexia.  No vomiting or diarrhea.  Rates her pain as moderate to severe, worse with palpation and walking, no alleviating factors.    History provided by:  Patient      Review of Systems   All other systems reviewed and are negative.      Past Medical History:   Diagnosis Date    Asthma     Increasing shortness of breath in revent months    Dental disease     Almost all top teeth are crowns    Dizziness     Always attributed to blood pressure    GERD (gastroesophageal reflux disease)     Headache     Hyperlipidemia     Insomnia     Migraine     Muscle spasm of right shoulder     Nosebleed     Sinusitis     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       Allergies   Allergen Reactions    Sulfa Antibiotics Rash    Wound Dressing Adhesive Rash       Past Surgical History:   Procedure Laterality Date    BREAST BIOPSY Right     benign    BREAST LUMPECTOMY WITH SENTINEL NODE BIOPSY Left 10/18/2024    Procedure: LEFT BREAST LUMPECTOMY WITH MAG SEED GUIDANCE x 2;  Surgeon: Delfina Baker MD;  Location: [facility];  Service: General;  Laterality: Left;    CARPAL TUNNEL RELEASE Bilateral      SECTION      COLONOSCOPY      ENDOMETRIAL ABLATION Bilateral 2018    tubal ligtion at same time    ENDOSCOPIC FUNCTIONAL SINUS SURGERY  (FESS) Bilateral 02/23/2024    Procedure: Bilateral functional endoscopic anterior ethmoidectomy with bilateral middle meatal antrostomy, Septoplasty, and Bilateral inferior turbinate reduction via Coblation;  Surgeon: Jadiel Smith MD;  Location[facility];  Service: ENT;  Laterality: Bilateral;    LAPAROSCOPIC TUBAL LIGATION      SINUS SURGERY  2/23/24       Family History   Problem Relation Age of Onset    Seizures Mother     Migraines Mother     Cancer Mother         Pre cancerous lesions in the colon    Hypertension Father     Diabetes Father     Breast cancer Paternal Grandmother     Cancer Paternal Grandmother         Breast and Colon Cancer    Cancer Maternal Grandmother         Bladder    Cancer Paternal Grandfather         Lung    Cancer Paternal Uncle         Bladder       Social History     Socioeconomic History    Marital status:    Tobacco Use    Smoking status: Former     Current packs/day: 0.50     Average packs/day: 0.5 packs/day for 20.0 years (10.0 ttl pk-yrs)     Types: Cigarettes    Smokeless tobacco: Never    Tobacco comments:     Doesn’t smoke cigarettes but have started using vape   Vaping Use    Vaping status: Every Day    Substances: Nicotine, Flavoring   Substance and Sexual Activity    Alcohol use: Yes     Alcohol/week: 2.0 standard drinks of alcohol     Types: 2 Glasses of wine per week    Drug use: Never    Sexual activity: Defer           Objective   Physical Exam  Vitals and nursing note reviewed.   Constitutional:       Appearance: She is well-developed and normal weight.   HENT:      Head: Normocephalic and atraumatic.      Nose: Nose normal. No congestion or rhinorrhea.      Mouth/Throat:      Mouth: Mucous membranes are moist.      Pharynx: No oropharyngeal exudate or posterior oropharyngeal erythema.   Cardiovascular:      Rate and Rhythm: Normal rate and regular rhythm.      Heart sounds: Normal heart sounds. No murmur heard.  Pulmonary:      Effort: Pulmonary  effort is normal.      Breath sounds: Normal breath sounds. No wheezing, rhonchi or rales.   Abdominal:      General: Abdomen is flat.      Tenderness: There is abdominal tenderness (Lower quadrant). There is guarding (Voluntary). There is no rebound.   Musculoskeletal:      Right lower leg: No edema.      Left lower leg: No edema.   Skin:     General: Skin is warm and dry.      Capillary Refill: Capillary refill takes less than 2 seconds.   Neurological:      General: No focal deficit present.      Mental Status: She is alert and oriented to person, place, and time. Mental status is at baseline.         Procedures         Lab Results (last 24 hours)       Procedure Component Value Units Date/Time    CBC & Differential [[id number]]  (Abnormal) Collected: 12/18/24 0800    Specimen: Blood Updated: 12/18/24 0828    Narrative:      The following orders were created for panel order CBC & Differential.  Procedure                               Abnormality         Status                     ---------                               -----------         ------                     CBC Auto Differential[[id number]]        Abnormal            Final result                 Please view results for these tests on the individual orders.    Comprehensive Metabolic Panel [[id number]]  (Abnormal) Collected: 12/18/24 0800    Specimen: Blood Updated: 12/18/24 0845     Glucose 98 mg/dL      BUN 8 mg/dL      Creatinine 0.69 mg/dL      Sodium 139 mmol/L      Potassium 3.7 mmol/L      Chloride 110 mmol/L      CO2 17.0 mmol/L      Calcium 8.5 mg/dL      Total Protein 6.5 g/dL      Albumin 3.8 g/dL      ALT (SGPT) 12 U/L      AST (SGOT) 12 U/L      Alkaline Phosphatase 96 U/L      Total Bilirubin 0.7 mg/dL      Globulin 2.7 gm/dL      A/G Ratio 1.4 g/dL      BUN/Creatinine Ratio 11.6     Anion Gap 12.0 mmol/L      eGFR 109.9 mL/min/1.73     Narrative:      GFR Categories in Chronic Kidney Disease (CKD)      GFR Category          GFR (mL/min/1.73)     Interpretation  G1                     90 or greater         Normal or high (1)  G2                      60-89                Mild decrease (1)  G3a                   45-59                Mild to moderate decrease  G3b                   30-44                Moderate to severe decrease  G4                    15-29                Severe decrease  G5                    14 or less           Kidney failure          (1)In the absence of evidence of kidney disease, neither GFR category G1 or G2 fulfill the criteria for CKD.    eGFR calculation 2021 CKD-EPI creatinine equation, which does not include race as a factor    Protime-INR [[id number]]  (Normal) Collected: 12/18/24 0800    Specimen: Blood Updated: 12/18/24 0834     Protime 14.3 Seconds      INR 1.06    Magnesium [[id number]]  (Normal) Collected: 12/18/24 0800    Specimen: Blood Updated: 12/18/24 0841     Magnesium 1.9 mg/dL     hCG, Serum, Qualitative [[id number]]  (Normal) Collected: 12/18/24 0800    Specimen: Blood Updated: 12/18/24 0838     HCG Qualitative Negative    CBC Auto Differential [[id number]]  (Abnormal) Collected: 12/18/24 0800    Specimen: Blood Updated: 12/18/24 0828     WBC 11.27 10*3/mm3      RBC 4.33 10*6/mm3      Hemoglobin 12.9 g/dL      Hematocrit 39.1 %      MCV 90.3 fL      MCH 29.8 pg      MCHC 33.0 g/dL      RDW 12.6 %      RDW-SD 41.5 fl      MPV 11.0 fL      Platelets 233 10*3/mm3      Neutrophil % 76.4 %      Lymphocyte % 12.0 %      Monocyte % 9.8 %      Eosinophil % 1.0 %      Basophil % 0.4 %      Immature Grans % 0.4 %      Neutrophils, Absolute 8.62 10*3/mm3      Lymphocytes, Absolute 1.35 10*3/mm3      Monocytes, Absolute 1.11 10*3/mm3      Eosinophils, Absolute 0.11 10*3/mm3      Basophils, Absolute 0.04 10*3/mm3      Immature Grans, Absolute 0.04 10*3/mm3      nRBC 0.0 /100 WBC     Urinalysis With Culture If Indicated - Urine, Clean Catch [[id number]]  (Normal) Collected: 12/18/24 0804    Specimen: Urine, Clean Catch Updated:  12/18/24 0826     Color, UA Yellow     Appearance, UA Clear     pH, UA 6.5     Specific Gravity, UA 1.016     Glucose, UA Negative     Ketones, UA Negative     Bilirubin, UA Negative     Blood, UA Negative     Protein, UA Negative     Leuk Esterase, UA Negative     Nitrite, UA Negative     Urobilinogen, UA 1.0 E.U./dL    Narrative:      In absence of clinical symptoms, the presence of pyuria, bacteria, and/or nitrites on the urinalysis result does not correlate with infection.  Urine microscopic not indicated.         CT Abdomen Pelvis With Contrast    Result Date: 12/18/2024  EXAM/TECHNIQUE: CT abdomen and pelvis with IV contrast  INDICATION: Left lower quadrant abdominal pain  COMPARISON: 12/16/2022  DLP: 233.53 mGy.cm. Automated exposure control was utilized to decrease patient radiation dose.  FINDINGS:  Lung bases are clear.  Stable small low-density liver lesions. No suspicious new or enlarging liver lesion. No cholelithiasis or biliary ductal dilatation. Pancreas appears normal. Spleen is unremarkable. Stable small bilateral adrenal gland nodules, likely adenomas.  No solid renal mass. No urolithiasis or hydronephrosis. No focal urinary bladder wall thickening.  Marked wall thickening of the distal descending colon with pronounced surrounding fat stranding and a background of diverticulosis. No free air or drainable organized fluid collection/abscess. The appendix appears normal. No small bowel distention or inflammatory change.  No ascites or free pelvic fluid. Multiple uterine fibroids are present including a 4.5 cm posterior uterine body fibroid. Presumed small right ovarian corpus luteal cyst. A few small coarse calcifications are present in the right ovary.  Abdominal aorta is nonaneurysmal. No abdominal or pelvic lymphadenopathy.  No acute soft tissue finding. No aggressive osseous lesion.       1.  Moderate to severe acute diverticulitis involving the distal descending colon. No definite evidence of  macro perforation. No organized drainable collection/abscess.  2.  Uterine fibroids.    This report was signed and finalized on 12/18/2024 9:14 AM by Dr. Joseluis Mcarthur MD.      ED Course  ED Course as of 12/18/24 0951   Wed Dec 18, 2024   0948 44-year-old female presents to the ED with 3-day history of worsening left lower quadrant abdominal pain, nausea, decreased appetite, fever.  Left lower quadrant tenderness on exam.  White count 11,000.  CT obtained, revealed moderate to severe diverticulitis in the left lower quadrant, no perforation or abscess formation.  Given severity of inflammation on CT, will admit for inpatient treatment for IV antibiotics, fluids and pain control. [AW]      ED Course User Index  [AW] Rajeev Frank MD                                                       Medical Decision Making      Final diagnoses:   Diverticulitis       ED Disposition  ED Disposition       ED Disposition   Decision to Admit    Condition   --    Comment   Level of Care: Med/Surg [1]   Diagnosis: Diverticulitis [109315]   Admitting Physician: CINDY SINGH [[id number]]   Attending Physician: CINDY SINGH [[id number]]   Isolate for COVID?: No [0]   Certification: I Certify That Inpatient Hospital Services Are Medically Necessary For Greater Than 2 Midnights                 No follow-up provider specified.       Medication List      No changes were made to your prescriptions during this visit.            Rajeev Frank MD  12/18/24 0951

## 2024-12-18 NOTE — PLAN OF CARE
Goal Outcome Evaluation:              Outcome Evaluation: Recieved pt from ED to room 376 with diverticulitis. c/o abd pain and nausea, see MAR. Lovenox. IVF and IV abx. up ad lindsay. tolerating clear liquid diet. safety maintained.

## 2024-12-18 NOTE — ED NOTES
"Nursing report ED to floor  Gloria Melgoza  44 y.o.  female    HPI:   Chief Complaint   Patient presents with    Abdominal Pain    Nausea    Fever       Admitting doctor:   Roberto Guillaume MD    Consulting provider(s):  Consults       No orders found from 11/19/2024 to 12/19/2024.             Admitting diagnosis:   The encounter diagnosis was Diverticulitis.    Code status:   Current Code Status       Date Active Code Status Order ID Comments User Context       Not on file            Allergies:   Sulfa antibiotics and Wound dressing adhesive    Intake and Output  No intake or output data in the 24 hours ending 12/18/24 1010    Weight:       12/18/24  0727   Weight: 69.6 kg (153 lb 8 oz)       Most recent vitals:   Vitals:    12/18/24 0727 12/18/24 0826 12/18/24 0846 12/18/24 0931   BP: 152/80 118/82 129/88 126/88   BP Location: Right arm      Patient Position: Sitting      Pulse: 91      Resp: 19      Temp: 98.4 °F (36.9 °C)      TempSrc: Oral      SpO2: 97%      Weight: 69.6 kg (153 lb 8 oz)      Height: 157.5 cm (62\")        Oxygen Therapy: .    Active LDAs/IV Access:   Lines, Drains & Airways       Active LDAs       Name Placement date Placement time Site Days    Peripheral IV 12/18/24 0802 Left Antecubital 12/18/24  0802  Antecubital  less than 1                    Labs (abnormal labs have a star):   Labs Reviewed   COMPREHENSIVE METABOLIC PANEL - Abnormal; Notable for the following components:       Result Value    Chloride 110 (*)     CO2 17.0 (*)     Calcium 8.5 (*)     All other components within normal limits    Narrative:     GFR Categories in Chronic Kidney Disease (CKD)      GFR Category          GFR (mL/min/1.73)    Interpretation  G1                     90 or greater         Normal or high (1)  G2                      60-89                Mild decrease (1)  G3a                   45-59                Mild to moderate decrease  G3b                   30-44                Moderate to severe " decrease  G4                    15-29                Severe decrease  G5                    14 or less           Kidney failure          (1)In the absence of evidence of kidney disease, neither GFR category G1 or G2 fulfill the criteria for CKD.    eGFR calculation 2021 CKD-EPI creatinine equation, which does not include race as a factor   CBC WITH AUTO DIFFERENTIAL - Abnormal; Notable for the following components:    WBC 11.27 (*)     Neutrophil % 76.4 (*)     Lymphocyte % 12.0 (*)     Neutrophils, Absolute 8.62 (*)     Monocytes, Absolute 1.11 (*)     All other components within normal limits   PROTIME-INR - Normal   MAGNESIUM - Normal   URINALYSIS W/ CULTURE IF INDICATED - Normal    Narrative:     In absence of clinical symptoms, the presence of pyuria, bacteria, and/or nitrites on the urinalysis result does not correlate with infection.  Urine microscopic not indicated.   HCG, SERUM, QUALITATIVE - Normal   BLOOD CULTURE   BLOOD CULTURE   CBC AND DIFFERENTIAL    Narrative:     The following orders were created for panel order CBC & Differential.  Procedure                               Abnormality         Status                     ---------                               -----------         ------                     CBC Auto Differential[[id number]]        Abnormal            Final result                 Please view results for these tests on the individual orders.       Meds given in ED:   Medications   sodium chloride 0.9 % flush 10 mL (has no administration in time range)   piperacillin-tazobactam (ZOSYN) 3.375 g IVPB in 100 mL NS MBP (CD) (3.375 g Intravenous New Bag 12/18/24 0959)   lactated ringers bolus 1,000 mL (0 mL Intravenous Stopped 12/18/24 0921)   ondansetron (ZOFRAN) injection 4 mg (4 mg Intravenous Given 12/18/24 0820)   HYDROmorphone (DILAUDID) injection 0.5 mg (0.5 mg Intravenous Given 12/18/24 0823)   iopamidol (ISOVUE-300) 61 % injection 100 mL (100 mL Intravenous Given 12/18/24 0857)            NIH Stroke Scale:       Isolation/Infection(s):  No active isolations   No active infections     COVID Testing  Collected .  Resulted .    Nursing report ED to floor:  Mental status: A&Ox4 .  Ambulatory status: Up ad lid.  Precautions: none.    ED nurse phone extentsion- [phone]..

## 2024-12-19 LAB
ANION GAP SERPL CALCULATED.3IONS-SCNC: 8 MMOL/L (ref 5–15)
BASOPHILS # BLD AUTO: 0.03 10*3/MM3 (ref 0–0.2)
BASOPHILS NFR BLD AUTO: 0.5 % (ref 0–1.5)
BUN SERPL-MCNC: 6 MG/DL (ref 6–20)
BUN/CREAT SERPL: 8.3 (ref 7–25)
CALCIUM SPEC-SCNC: 8.1 MG/DL (ref 8.6–10.5)
CHLORIDE SERPL-SCNC: 110 MMOL/L (ref 98–107)
CO2 SERPL-SCNC: 20 MMOL/L (ref 22–29)
CREAT SERPL-MCNC: 0.72 MG/DL (ref 0.57–1)
DEPRECATED RDW RBC AUTO: 42.6 FL (ref 37–54)
EGFRCR SERPLBLD CKD-EPI 2021: 105.9 ML/MIN/1.73
EOSINOPHIL # BLD AUTO: 0.15 10*3/MM3 (ref 0–0.4)
EOSINOPHIL NFR BLD AUTO: 2.5 % (ref 0.3–6.2)
ERYTHROCYTE [DISTWIDTH] IN BLOOD BY AUTOMATED COUNT: 12.6 % (ref 12.3–15.4)
GLUCOSE SERPL-MCNC: 94 MG/DL (ref 65–99)
HCT VFR BLD AUTO: 38.7 % (ref 34–46.6)
HGB BLD-MCNC: 12.2 G/DL (ref 12–15.9)
IMM GRANULOCYTES # BLD AUTO: 0.03 10*3/MM3 (ref 0–0.05)
IMM GRANULOCYTES NFR BLD AUTO: 0.5 % (ref 0–0.5)
LYMPHOCYTES # BLD AUTO: 1.59 10*3/MM3 (ref 0.7–3.1)
LYMPHOCYTES NFR BLD AUTO: 26 % (ref 19.6–45.3)
MCH RBC QN AUTO: 29.3 PG (ref 26.6–33)
MCHC RBC AUTO-ENTMCNC: 31.5 G/DL (ref 31.5–35.7)
MCV RBC AUTO: 92.8 FL (ref 79–97)
MONOCYTES # BLD AUTO: 0.7 10*3/MM3 (ref 0.1–0.9)
MONOCYTES NFR BLD AUTO: 11.5 % (ref 5–12)
NEUTROPHILS NFR BLD AUTO: 3.61 10*3/MM3 (ref 1.7–7)
NEUTROPHILS NFR BLD AUTO: 59 % (ref 42.7–76)
NRBC BLD AUTO-RTO: 0 /100 WBC (ref 0–0.2)
PLATELET # BLD AUTO: 212 10*3/MM3 (ref 140–450)
PMV BLD AUTO: 11.1 FL (ref 6–12)
POTASSIUM SERPL-SCNC: 3.8 MMOL/L (ref 3.5–5.2)
RBC # BLD AUTO: 4.17 10*6/MM3 (ref 3.77–5.28)
SODIUM SERPL-SCNC: 138 MMOL/L (ref 136–145)
WBC NRBC COR # BLD AUTO: 6.11 10*3/MM3 (ref 3.4–10.8)

## 2024-12-19 PROCEDURE — 85025 COMPLETE CBC W/AUTO DIFF WBC: CPT | Performed by: FAMILY MEDICINE

## 2024-12-19 PROCEDURE — 25010000002 ONDANSETRON PER 1 MG: Performed by: FAMILY MEDICINE

## 2024-12-19 PROCEDURE — 25010000002 ENOXAPARIN PER 10 MG: Performed by: FAMILY MEDICINE

## 2024-12-19 PROCEDURE — 80048 BASIC METABOLIC PNL TOTAL CA: CPT | Performed by: FAMILY MEDICINE

## 2024-12-19 PROCEDURE — 25010000002 HYDROMORPHONE PER 4 MG: Performed by: FAMILY MEDICINE

## 2024-12-19 PROCEDURE — 25010000002 PIPERACILLIN SOD-TAZOBACTAM PER 1 G: Performed by: FAMILY MEDICINE

## 2024-12-19 RX ADMIN — HYDROMORPHONE HYDROCHLORIDE 0.5 MG: 1 INJECTION, SOLUTION INTRAMUSCULAR; INTRAVENOUS; SUBCUTANEOUS at 12:08

## 2024-12-19 RX ADMIN — TOPIRAMATE 100 MG: 100 TABLET, FILM COATED ORAL at 20:25

## 2024-12-19 RX ADMIN — HYDROMORPHONE HYDROCHLORIDE 0.5 MG: 1 INJECTION, SOLUTION INTRAMUSCULAR; INTRAVENOUS; SUBCUTANEOUS at 03:57

## 2024-12-19 RX ADMIN — HYDROMORPHONE HYDROCHLORIDE 0.5 MG: 1 INJECTION, SOLUTION INTRAMUSCULAR; INTRAVENOUS; SUBCUTANEOUS at 17:13

## 2024-12-19 RX ADMIN — AZELASTINE HYDROCHLORIDE 2 SPRAY: 137 SPRAY, METERED NASAL at 08:05

## 2024-12-19 RX ADMIN — ACETAMINOPHEN 500 MG: 500 TABLET, FILM COATED ORAL at 23:02

## 2024-12-19 RX ADMIN — ENOXAPARIN SODIUM 40 MG: 100 INJECTION SUBCUTANEOUS at 17:13

## 2024-12-19 RX ADMIN — ROPINIROLE HYDROCHLORIDE 1 MG: 1 TABLET, FILM COATED ORAL at 20:25

## 2024-12-19 RX ADMIN — PIPERACILLIN SODIUM AND TAZOBACTAM SODIUM 3.38 G: 3; .375 INJECTION, POWDER, LYOPHILIZED, FOR SOLUTION INTRAVENOUS at 17:12

## 2024-12-19 RX ADMIN — ONDANSETRON HYDROCHLORIDE 8 MG: 2 INJECTION INTRAMUSCULAR; INTRAVENOUS at 20:25

## 2024-12-19 RX ADMIN — PIPERACILLIN SODIUM AND TAZOBACTAM SODIUM 3.38 G: 3; .375 INJECTION, POWDER, LYOPHILIZED, FOR SOLUTION INTRAVENOUS at 23:02

## 2024-12-19 RX ADMIN — ROSUVASTATIN 10 MG: 10 TABLET, FILM COATED ORAL at 20:25

## 2024-12-19 RX ADMIN — HYDROMORPHONE HYDROCHLORIDE 0.5 MG: 1 INJECTION, SOLUTION INTRAMUSCULAR; INTRAVENOUS; SUBCUTANEOUS at 21:11

## 2024-12-19 RX ADMIN — ACETAMINOPHEN 500 MG: 500 TABLET, FILM COATED ORAL at 07:28

## 2024-12-19 RX ADMIN — PIPERACILLIN SODIUM AND TAZOBACTAM SODIUM 3.38 G: 3; .375 INJECTION, POWDER, LYOPHILIZED, FOR SOLUTION INTRAVENOUS at 08:05

## 2024-12-19 RX ADMIN — AZELASTINE HYDROCHLORIDE 2 SPRAY: 137 SPRAY, METERED NASAL at 20:26

## 2024-12-19 RX ADMIN — ONDANSETRON HYDROCHLORIDE 8 MG: 2 INJECTION INTRAMUSCULAR; INTRAVENOUS at 10:10

## 2024-12-19 RX ADMIN — ACETAMINOPHEN 500 MG: 500 TABLET, FILM COATED ORAL at 14:04

## 2024-12-19 RX ADMIN — PROPRANOLOL HYDROCHLORIDE 120 MG: 60 CAPSULE, EXTENDED RELEASE ORAL at 20:25

## 2024-12-19 RX ADMIN — FLUTICASONE PROPIONATE 1 SPRAY: 50 SPRAY, METERED NASAL at 08:05

## 2024-12-19 RX ADMIN — HYDROMORPHONE HYDROCHLORIDE 0.5 MG: 1 INJECTION, SOLUTION INTRAMUSCULAR; INTRAVENOUS; SUBCUTANEOUS at 08:05

## 2024-12-19 RX ADMIN — Medication 10 ML: at 21:12

## 2024-12-19 NOTE — PAYOR COMM NOTE
"Gloria Kiran (44 y.o. Female)  [id number]  Admit  12/18    [facility] phone   fax            Date of Birth   1980    Social Security Number       Address   [address] [place] [address]    Home Phone   [phone]    MRN   [id number]       Mormon   B[place]ai    Marital Status                               Admission Date   12/18/24    Admission Type   Emergency    Admitting Provider   Roberto Guillaume MD    Attending Provider   Roberto Guillaume MD    Department, Room/Bed   [facility] 3C, 376/1       Discharge Date       Discharge Disposition       Discharge Destination                                 Attending Provider: Roberto Guillaume MD    Allergies: Sulfa Antibiotics, Wound Dressing Adhesive    Isolation: None   Infection: None   Code Status: CPR    Ht: 157.5 cm (62\")   Wt: 69 kg (152 lb 3.2 oz)    Admission Cmt: None   Principal Problem: Diverticulitis [K57.92]                   Active Insurance as of 12/18/2024       Primary Coverage       Payor Plan Insurance Group Employer/Plan Group    [employer] [id number]       Payor Plan Address Payor Plan Phone Number Payor Plan Fax Number Effective Dates    [address] [phone]  8/1/2022 - None Entered    [place] [address]         Subscriber Name Subscriber Birth Date Member ID       GLORIA KIRAN 1980 [id number]                     Emergency Contacts        (Rel.) Home Phone Work Phone Mobile Phone    David Kiran (Spouse) [phone] -- [phone]    MyahCrista (Mother) [phone] -- --                 History & Physical        Roberto Guillaume MD at 12/18/24 9947              History and Physical    Patient:  Gloria Kiran  MRN: [id number]    CHIEF COMPLAINT: Left lower quadrant abdominal pain    History Obtained From: the patient   PCP: Roberto Guillaume MD    HISTORY OF PRESENT " ILLNESS:   The patient is a 44 y.o. female who presents with 2 to 3-day history of left lower quadrant abdominal pain fever chills and overall malaise.  Further workup in the emergency department reveals acute diverticulitis without evidence of perforation.  Given her degree of pain and discomfort she has been to for IV analgesia IV fluid resuscitation and IV antibiotics.    REVIEW OF SYSTEMS:    Review of Systems   Constitutional:  Positive for activity change, chills and fever.   HENT: Negative.     Respiratory: Negative.     Cardiovascular: Negative.    Gastrointestinal:  Positive for abdominal pain, blood in stool and nausea.   Genitourinary: Negative.    Musculoskeletal: Negative.    Hematological: Negative.    Psychiatric/Behavioral: Negative.            Past Medical History:  Past Medical History:   Diagnosis Date    Asthma     Increasing shortness of breath in revent months    Dental disease     Almost all top teeth are crowns    Dizziness     Always attributed to blood pressure    GERD (gastroesophageal reflux disease)     Headache     Hyperlipidemia     Insomnia     Migraine     Muscle spasm of right shoulder     Nosebleed     Sinusitis     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       Past Surgical History:  Past Surgical History:   Procedure Laterality Date    BREAST BIOPSY Right     benign    BREAST LUMPECTOMY WITH SENTINEL NODE BIOPSY Left 10/18/2024    Procedure: LEFT BREAST LUMPECTOMY WITH MAG SEED GUIDANCE x 2;  Surgeon: Delfina Baker MD;  Location: [facility];  Service: General;  Laterality: Left;    CARPAL TUNNEL RELEASE Bilateral      SECTION      COLONOSCOPY      ENDOMETRIAL ABLATION Bilateral 2018    tubal ligtion at same time    ENDOSCOPIC FUNCTIONAL SINUS SURGERY (FESS) Bilateral 2024    Procedure: Bilateral functional endoscopic anterior ethmoidectomy with bilateral middle meatal antrostomy, Septoplasty, and Bilateral inferior turbinate reduction via  Coblation;  Surgeon: Jadiel Smith MD;  Location: [facility];  Service: ENT;  Laterality: Bilateral;    LAPAROSCOPIC TUBAL LIGATION      SINUS SURGERY  2/23/24       Medications Prior to Admission:    Facility-Administered Medications Prior to Admission   Medication Dose Route Frequency Provider Last Rate Last Admin    lidocaine (XYLOCAINE) 1 % injection 10 mL  10 mL Subcutaneous Once Leonela Banks PA        lidocaine (XYLOCAINE) 1 % injection 10 mL  10 mL Subcutaneous Once Delfina Baker MD        lidocaine 1% - EPINEPHrine 1:[id number] (XYLOCAINE W/EPI) 1 %-1:406180 injection 10 mL  10 mL Injection Once Leonela Banks PA         Medications Prior to Admission   Medication Sig Dispense Refill Last Dose/Taking    Azelastine HCl 137 MCG/SPRAY solution Use 1 spray into the nostril(s) as directed by provider 2 (Two) Times a Day. 30 mL 2 12/18/2024    fluticasone (FLONASE) 50 MCG/ACT nasal spray 1 spray into the nostril(s) as directed by provider. 16 g 1 12/18/2024    ibuprofen (Motrin IB) 200 MG tablet Take 3 tablets by mouth Every 8 (Eight) Hours. Take every 8 hours for three days then take as needed.   12/17/2024    ondansetron (Zofran) 4 MG tablet Take 1 tablet by mouth Every 8 (Eight) Hours As Needed for Nausea or Vomiting. 15 tablet 0 12/18/2024    propranolol LA (INDERAL LA) 120 MG 24 hr capsule Take 1 capsule by mouth Daily. (Patient taking differently: Take 1 capsule by mouth Every Night.) 30 capsule 3 12/17/2024    rOPINIRole (REQUIP) 1 MG tablet Take 1 tablet by mouth every night at bedtime. Take 1-3 hour before bedtime. 90 tablet 3 12/17/2024    rosuvastatin (CRESTOR) 10 MG tablet Take 1 tablet by mouth every night at bedtime. 30 tablet 2 12/17/2024    tiZANidine (ZANAFLEX) 4 MG tablet Take 1 tablet by mouth at bedtime as needed for muscle spasms. 30 tablet 2 12/17/2024    topiramate (TOPAMAX) 100 MG tablet Take 1 tablet by mouth 2 (Two) Times a Day. (Patient taking differently:  Take 1 tablet by mouth Daily.) 180 tablet 3 Patient Taking Differently    topiramate (TOPAMAX) 50 MG tablet Take 1 tablet by mouth twice daily. 60 tablet 6 12/17/2024    zolpidem (Ambien) 10 MG tablet Take 1 tablet by mouth At Night As Needed---do not take with lunesta 30 tablet 0 12/17/2024    acetaminophen (Tylenol) 325 MG tablet Take 3 tablets by mouth Every 8 (Eight) Hours. Take every 8 hours for 3 days then take prn as needed.       doxycycline (VIBRAMYCIN) 100 MG capsule Take 1 capsule by mouth 2 (Two) Times a Day. 14 capsule 0     multivitamin with minerals tablet tablet Take 1 tablet by mouth Daily.       rosuvastatin (CRESTOR) 20 MG tablet Take 1 tablet by mouth Daily. 90 tablet 3     tiZANidine (ZANAFLEX) 4 MG tablet Take 1 tablet by mouth at bedtime as needed for muscle spasms. 30 tablet 2        Allergies:  Sulfa antibiotics and Wound dressing adhesive    Social History:   Social History     Socioeconomic History    Marital status:    Tobacco Use    Smoking status: Former     Current packs/day: 0.50     Average packs/day: 0.5 packs/day for 20.0 years (10.0 ttl pk-yrs)     Types: Cigarettes    Smokeless tobacco: Never    Tobacco comments:     Doesn’t smoke cigarettes but have started using vape   Vaping Use    Vaping status: Every Day    Substances: Nicotine, Flavoring   Substance and Sexual Activity    Alcohol use: Yes     Alcohol/week: 2.0 standard drinks of alcohol     Types: 2 Glasses of wine per week    Drug use: Never    Sexual activity: Defer       Family History:   Family History   Problem Relation Age of Onset    Seizures Mother     Migraines Mother     Cancer Mother         Pre cancerous lesions in the colon    Hypertension Father     Diabetes Father     Breast cancer Paternal Grandmother     Cancer Paternal Grandmother         Breast and Colon Cancer    Cancer Maternal Grandmother         Bladder    Cancer Paternal Grandfather         Lung    Cancer Paternal Uncle         Bladder  "          Physical Exam:    Vitals: /76 (BP Location: Right arm, Patient Position: Lying)   Pulse 72   Temp 98 °F (36.7 °C) (Oral)   Resp 18   Ht 157.5 cm (62\")   Wt 69.6 kg (153 lb 8 oz)   SpO2 100%   BMI 28.08 kg/m²   Physical Exam  Vitals and nursing note reviewed.   Constitutional:       Appearance: Normal appearance.   HENT:      Head: Normocephalic and atraumatic.      Nose: Nose normal.      Mouth/Throat:      Mouth: Mucous membranes are moist.   Eyes:      Pupils: Pupils are equal, round, and reactive to light.   Cardiovascular:      Rate and Rhythm: Normal rate and regular rhythm.      Pulses: Normal pulses.      Heart sounds: Normal heart sounds.   Pulmonary:      Effort: Pulmonary effort is normal.      Breath sounds: Normal breath sounds.   Abdominal:      General: Abdomen is flat. Bowel sounds are normal.      Tenderness: There is abdominal tenderness.   Skin:     General: Skin is warm.      Capillary Refill: Capillary refill takes less than 2 seconds.   Neurological:      General: No focal deficit present.      Mental Status: She is alert.           Lab Results (last 24 hours)       Procedure Component Value Units Date/Time    Blood Culture - Blood, Arm, Right [[id number]] Collected: 12/18/24 0946    Specimen: Blood from Arm, Right Updated: 12/18/24 1028    Blood Culture - Blood, Arm, Left [[id number]] Collected: 12/18/24 0955    Specimen: Blood from Arm, Left Updated: 12/18/24 1028    Comprehensive Metabolic Panel [[id number]]  (Abnormal) Collected: 12/18/24 0800    Specimen: Blood Updated: 12/18/24 0845     Glucose 98 mg/dL      BUN 8 mg/dL      Creatinine 0.69 mg/dL      Sodium 139 mmol/L      Potassium 3.7 mmol/L      Chloride 110 mmol/L      CO2 17.0 mmol/L      Calcium 8.5 mg/dL      Total Protein 6.5 g/dL      Albumin 3.8 g/dL      ALT (SGPT) 12 U/L      AST (SGOT) 12 U/L      Alkaline Phosphatase 96 U/L      Total Bilirubin 0.7 mg/dL      Globulin 2.7 gm/dL      A/G Ratio 1.4 g/dL     "  BUN/Creatinine Ratio 11.6     Anion Gap 12.0 mmol/L      eGFR 109.9 mL/min/1.73     Narrative:      GFR Categories in Chronic Kidney Disease (CKD)      GFR Category          GFR (mL/min/1.73)    Interpretation  G1                     90 or greater         Normal or high (1)  G2                      60-89                Mild decrease (1)  G3a                   45-59                Mild to moderate decrease  G3b                   30-44                Moderate to severe decrease  G4                    15-29                Severe decrease  G5                    14 or less           Kidney failure          (1)In the absence of evidence of kidney disease, neither GFR category G1 or G2 fulfill the criteria for CKD.    eGFR calculation 2021 CKD-EPI creatinine equation, which does not include race as a factor    Magnesium [[id number]]  (Normal) Collected: 12/18/24 0800    Specimen: Blood Updated: 12/18/24 0841     Magnesium 1.9 mg/dL     hCG, Serum, Qualitative [[id number]]  (Normal) Collected: 12/18/24 0800    Specimen: Blood Updated: 12/18/24 0838     HCG Qualitative Negative    Protime-INR [[id number]]  (Normal) Collected: 12/18/24 0800    Specimen: Blood Updated: 12/18/24 0834     Protime 14.3 Seconds      INR 1.06    CBC & Differential [[id number]]  (Abnormal) Collected: 12/18/24 0800    Specimen: Blood Updated: 12/18/24 0828    Narrative:      The following orders were created for panel order CBC & Differential.  Procedure                               Abnormality         Status                     ---------                               -----------         ------                     CBC Auto Differential[[id number]]        Abnormal            Final result                 Please view results for these tests on the individual orders.    CBC Auto Differential [[id number]]  (Abnormal) Collected: 12/18/24 0800    Specimen: Blood Updated: 12/18/24 0828     WBC 11.27 10*3/mm3      RBC 4.33 10*6/mm3      Hemoglobin 12.9 g/dL       Hematocrit 39.1 %      MCV 90.3 fL      MCH 29.8 pg      MCHC 33.0 g/dL      RDW 12.6 %      RDW-SD 41.5 fl      MPV 11.0 fL      Platelets 233 10*3/mm3      Neutrophil % 76.4 %      Lymphocyte % 12.0 %      Monocyte % 9.8 %      Eosinophil % 1.0 %      Basophil % 0.4 %      Immature Grans % 0.4 %      Neutrophils, Absolute 8.62 10*3/mm3      Lymphocytes, Absolute 1.35 10*3/mm3      Monocytes, Absolute 1.11 10*3/mm3      Eosinophils, Absolute 0.11 10*3/mm3      Basophils, Absolute 0.04 10*3/mm3      Immature Grans, Absolute 0.04 10*3/mm3      nRBC 0.0 /100 WBC     Urinalysis With Culture If Indicated - Urine, Clean Catch [[id number]]  (Normal) Collected: 12/18/24 0804    Specimen: Urine, Clean Catch Updated: 12/18/24 0826     Color, UA Yellow     Appearance, UA Clear     pH, UA 6.5     Specific Gravity, UA 1.016     Glucose, UA Negative     Ketones, UA Negative     Bilirubin, UA Negative     Blood, UA Negative     Protein, UA Negative     Leuk Esterase, UA Negative     Nitrite, UA Negative     Urobilinogen, UA 1.0 E.U./dL    Narrative:      In absence of clinical symptoms, the presence of pyuria, bacteria, and/or nitrites on the urinalysis result does not correlate with infection.  Urine microscopic not indicated.             -----------------------------------------------------------------  EKG: Nonacute  Radiology:     CT Abdomen Pelvis With Contrast    Result Date: 12/18/2024  EXAM/TECHNIQUE: CT abdomen and pelvis with IV contrast  INDICATION: Left lower quadrant abdominal pain  COMPARISON: 12/16/2022  DLP: 233.53 mGy.cm. Automated exposure control was utilized to decrease patient radiation dose.  FINDINGS:  Lung bases are clear.  Stable small low-density liver lesions. No suspicious new or enlarging liver lesion. No cholelithiasis or biliary ductal dilatation. Pancreas appears normal. Spleen is unremarkable. Stable small bilateral adrenal gland nodules, likely adenomas.  No solid renal mass. No urolithiasis or  hydronephrosis. No focal urinary bladder wall thickening.  Marked wall thickening of the distal descending colon with pronounced surrounding fat stranding and a background of diverticulosis. No free air or drainable organized fluid collection/abscess. The appendix appears normal. No small bowel distention or inflammatory change.  No ascites or free pelvic fluid. Multiple uterine fibroids are present including a 4.5 cm posterior uterine body fibroid. Presumed small right ovarian corpus luteal cyst. A few small coarse calcifications are present in the right ovary.  Abdominal aorta is nonaneurysmal. No abdominal or pelvic lymphadenopathy.  No acute soft tissue finding. No aggressive osseous lesion.       1.  Moderate to severe acute diverticulitis involving the distal descending colon. No definite evidence of macro perforation. No organized drainable collection/abscess.  2.  Uterine fibroids.    This report was signed and finalized on 12/18/2024 9:14 AM by Dr. Joseluis Mcarthur MD.        Assessment and Plan     Primary Problem:  Diverticulitis  Leukocytosis    Hospital Problem list:    Diverticulitis      PMH:  Past Medical History:   Diagnosis Date    Asthma     Increasing shortness of breath in revent months    Dental disease     Almost all top teeth are crowns    Dizziness     Always attributed to blood pressure    GERD (gastroesophageal reflux disease)     Headache     Hyperlipidemia     Insomnia     Migraine     Muscle spasm of right shoulder     Nosebleed     Sinusitis     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       Treatment Plan:  IV antibiotics to cover diverticulitis  IV fluid resuscitation  IV analgesia  IV antiemetics  Follow clinical course    Disposition: Home    Roberto Guillaume MD 12/18/2024 14:25 CST    Electronically signed by Roberto Guillaume MD at 12/18/24 1428          Emergency Department Notes        Carlita Harding, RN at 12/18/24 1010          Nursing report ED to  "floor  Gloria Melgoza  44 y.o.  female    HPI:   Chief Complaint   Patient presents with    Abdominal Pain    Nausea    Fever       Admitting doctor:   Roberto Guillaume MD    Consulting provider(s):  Consults       No orders found from 11/19/2024 to 12/19/2024.             Admitting diagnosis:   The encounter diagnosis was Diverticulitis.    Code status:   Current Code Status       Date Active Code Status Order ID Comments User Context       Not on file            Allergies:   Sulfa antibiotics and Wound dressing adhesive    Intake and Output  No intake or output data in the 24 hours ending 12/18/24 1010    Weight:       12/18/24  0727   Weight: 69.6 kg (153 lb 8 oz)       Most recent vitals:   Vitals:    12/18/24 0727 12/18/24 0826 12/18/24 0846 12/18/24 0931   BP: 152/80 118/82 129/88 126/88   BP Location: Right arm      Patient Position: Sitting      Pulse: 91      Resp: 19      Temp: 98.4 °F (36.9 °C)      TempSrc: Oral      SpO2: 97%      Weight: 69.6 kg (153 lb 8 oz)      Height: 157.5 cm (62\")        Oxygen Therapy: .    Active LDAs/IV Access:   Lines, Drains & Airways       Active LDAs       Name Placement date Placement time Site Days    Peripheral IV 12/18/24 0802 Left Antecubital 12/18/24  0802  Antecubital  less than 1                    Labs (abnormal labs have a star):   Labs Reviewed   COMPREHENSIVE METABOLIC PANEL - Abnormal; Notable for the following components:       Result Value    Chloride 110 (*)     CO2 17.0 (*)     Calcium 8.5 (*)     All other components within normal limits    Narrative:     GFR Categories in Chronic Kidney Disease (CKD)      GFR Category          GFR (mL/min/1.73)    Interpretation  G1                     90 or greater         Normal or high (1)  G2                      60-89                Mild decrease (1)  G3a                   45-59                Mild to moderate decrease  G3b                   30-44                Moderate to severe decrease  G4                   "  15-29                Severe decrease  G5                    14 or less           Kidney failure          (1)In the absence of evidence of kidney disease, neither GFR category G1 or G2 fulfill the criteria for CKD.    eGFR calculation 2021 CKD-EPI creatinine equation, which does not include race as a factor   CBC WITH AUTO DIFFERENTIAL - Abnormal; Notable for the following components:    WBC 11.27 (*)     Neutrophil % 76.4 (*)     Lymphocyte % 12.0 (*)     Neutrophils, Absolute 8.62 (*)     Monocytes, Absolute 1.11 (*)     All other components within normal limits   PROTIME-INR - Normal   MAGNESIUM - Normal   URINALYSIS W/ CULTURE IF INDICATED - Normal    Narrative:     In absence of clinical symptoms, the presence of pyuria, bacteria, and/or nitrites on the urinalysis result does not correlate with infection.  Urine microscopic not indicated.   HCG, SERUM, QUALITATIVE - Normal   BLOOD CULTURE   BLOOD CULTURE   CBC AND DIFFERENTIAL    Narrative:     The following orders were created for panel order CBC & Differential.  Procedure                               Abnormality         Status                     ---------                               -----------         ------                     CBC Auto Differential[[id number]]        Abnormal            Final result                 Please view results for these tests on the individual orders.       Meds given in ED:   Medications   sodium chloride 0.9 % flush 10 mL (has no administration in time range)   piperacillin-tazobactam (ZOSYN) 3.375 g IVPB in 100 mL NS MBP (CD) (3.375 g Intravenous New Bag 12/18/24 0959)   lactated ringers bolus 1,000 mL (0 mL Intravenous Stopped 12/18/24 0921)   ondansetron (ZOFRAN) injection 4 mg (4 mg Intravenous Given 12/18/24 0820)   HYDROmorphone (DILAUDID) injection 0.5 mg (0.5 mg Intravenous Given 12/18/24 0823)   iopamidol (ISOVUE-300) 61 % injection 100 mL (100 mL Intravenous Given 12/18/24 0857)           NIH Stroke Scale:        Isolation/Infection(s):  No active isolations   No active infections     COVID Testing  Collected .  Resulted .    Nursing report ED to floor:  Mental status: A&Ox4 .  Ambulatory status: Up ad lid.  Precautions: none.    ED nurse phone extentsion- [phone]..      Electronically signed by Carlita Harding RN at 12/18/24 1012       Rajeev Frank MD at 12/18/24 1613          Subjective   History of Present Illness  44-year-old female presents to the emergency department with complaint of left lower quadrant abdominal pain and nausea.  She has a history of diverticulosis/diverticulitis.    Patient reports onset of nonspecific generalized abdominal pain about 3 days ago.  Pain has been continuous since onset, has been gradually worsening.  Initially felt symptoms may be related to menstrual cramps.  Pain has been moderate to severe since yesterday and has migrated to the left lower quadrant which cause concern for diverticulitis so patient came to the ED for further evaluation.  She reports low-grade fever yesterday.  She has had nausea and anorexia.  No vomiting or diarrhea.  Rates her pain as moderate to severe, worse with palpation and walking, no alleviating factors.    History provided by:  Patient      Review of Systems   All other systems reviewed and are negative.      Past Medical History:   Diagnosis Date    Asthma     Increasing shortness of breath in revent months    Dental disease     Almost all top teeth are crowns    Dizziness     Always attributed to blood pressure    GERD (gastroesophageal reflux disease)     Headache     Hyperlipidemia     Insomnia     Migraine     Muscle spasm of right shoulder     Nosebleed     Sinusitis     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       Allergies   Allergen Reactions    Sulfa Antibiotics Rash    Wound Dressing Adhesive Rash       Past Surgical History:   Procedure Laterality Date    BREAST BIOPSY Right     benign    BREAST LUMPECTOMY WITH  SENTINEL NODE BIOPSY Left 10/18/2024    Procedure: LEFT BREAST LUMPECTOMY WITH MAG SEED GUIDANCE x 2;  Surgeon: Delfina Baker MD;  Location:  [facility];  Service: General;  Laterality: Left;    CARPAL TUNNEL RELEASE Bilateral      SECTION      COLONOSCOPY      ENDOMETRIAL ABLATION Bilateral 2018    tubal ligtion at same time    ENDOSCOPIC FUNCTIONAL SINUS SURGERY (FESS) Bilateral 2024    Procedure: Bilateral functional endoscopic anterior ethmoidectomy with bilateral middle meatal antrostomy, Septoplasty, and Bilateral inferior turbinate reduction via Coblation;  Surgeon: Jadiel Smith MD;  Location:  [facility];  Service: ENT;  Laterality: Bilateral;    LAPAROSCOPIC TUBAL LIGATION      SINUS SURGERY  24       Family History   Problem Relation Age of Onset    Seizures Mother     Migraines Mother     Cancer Mother         Pre cancerous lesions in the colon    Hypertension Father     Diabetes Father     Breast cancer Paternal Grandmother     Cancer Paternal Grandmother         Breast and Colon Cancer    Cancer Maternal Grandmother         Bladder    Cancer Paternal Grandfather         Lung    Cancer Paternal Uncle         Bladder       Social History     Socioeconomic History    Marital status:    Tobacco Use    Smoking status: Former     Current packs/day: 0.50     Average packs/day: 0.5 packs/day for 20.0 years (10.0 ttl pk-yrs)     Types: Cigarettes    Smokeless tobacco: Never    Tobacco comments:     Doesn’t smoke cigarettes but have started using vape   Vaping Use    Vaping status: Every Day    Substances: Nicotine, Flavoring   Substance and Sexual Activity    Alcohol use: Yes     Alcohol/week: 2.0 standard drinks of alcohol     Types: 2 Glasses of wine per week    Drug use: Never    Sexual activity: Defer           Objective   Physical Exam  Vitals and nursing note reviewed.   Constitutional:       Appearance: She is well-developed and normal weight.   HENT:      Head:  Normocephalic and atraumatic.      Nose: Nose normal. No congestion or rhinorrhea.      Mouth/Throat:      Mouth: Mucous membranes are moist.      Pharynx: No oropharyngeal exudate or posterior oropharyngeal erythema.   Cardiovascular:      Rate and Rhythm: Normal rate and regular rhythm.      Heart sounds: Normal heart sounds. No murmur heard.  Pulmonary:      Effort: Pulmonary effort is normal.      Breath sounds: Normal breath sounds. No wheezing, rhonchi or rales.   Abdominal:      General: Abdomen is flat.      Tenderness: There is abdominal tenderness (Lower quadrant). There is guarding (Voluntary). There is no rebound.   Musculoskeletal:      Right lower leg: No edema.      Left lower leg: No edema.   Skin:     General: Skin is warm and dry.      Capillary Refill: Capillary refill takes less than 2 seconds.   Neurological:      General: No focal deficit present.      Mental Status: She is alert and oriented to person, place, and time. Mental status is at baseline.         Procedures        Lab Results (last 24 hours)       Procedure Component Value Units Date/Time    CBC & Differential [[id number]]  (Abnormal) Collected: 12/18/24 0800    Specimen: Blood Updated: 12/18/24 0828    Narrative:      The following orders were created for panel order CBC & Differential.  Procedure                               Abnormality         Status                     ---------                               -----------         ------                     CBC Auto Differential[[id number]]        Abnormal            Final result                 Please view results for these tests on the individual orders.    Comprehensive Metabolic Panel [[id number]]  (Abnormal) Collected: 12/18/24 0800    Specimen: Blood Updated: 12/18/24 0845     Glucose 98 mg/dL      BUN 8 mg/dL      Creatinine 0.69 mg/dL      Sodium 139 mmol/L      Potassium 3.7 mmol/L      Chloride 110 mmol/L      CO2 17.0 mmol/L      Calcium 8.5 mg/dL      Total Protein 6.5  g/dL      Albumin 3.8 g/dL      ALT (SGPT) 12 U/L      AST (SGOT) 12 U/L      Alkaline Phosphatase 96 U/L      Total Bilirubin 0.7 mg/dL      Globulin 2.7 gm/dL      A/G Ratio 1.4 g/dL      BUN/Creatinine Ratio 11.6     Anion Gap 12.0 mmol/L      eGFR 109.9 mL/min/1.73     Narrative:      GFR Categories in Chronic Kidney Disease (CKD)      GFR Category          GFR (mL/min/1.73)    Interpretation  G1                     90 or greater         Normal or high (1)  G2                      60-89                Mild decrease (1)  G3a                   45-59                Mild to moderate decrease  G3b                   30-44                Moderate to severe decrease  G4                    15-29                Severe decrease  G5                    14 or less           Kidney failure          (1)In the absence of evidence of kidney disease, neither GFR category G1 or G2 fulfill the criteria for CKD.    eGFR calculation 2021 CKD-EPI creatinine equation, which does not include race as a factor    Protime-INR [[id number]]  (Normal) Collected: 12/18/24 0800    Specimen: Blood Updated: 12/18/24 0834     Protime 14.3 Seconds      INR 1.06    Magnesium [[id number]]  (Normal) Collected: 12/18/24 0800    Specimen: Blood Updated: 12/18/24 0841     Magnesium 1.9 mg/dL     hCG, Serum, Qualitative [[id number]]  (Normal) Collected: 12/18/24 0800    Specimen: Blood Updated: 12/18/24 0838     HCG Qualitative Negative    CBC Auto Differential [[id number]]  (Abnormal) Collected: 12/18/24 0800    Specimen: Blood Updated: 12/18/24 0828     WBC 11.27 10*3/mm3      RBC 4.33 10*6/mm3      Hemoglobin 12.9 g/dL      Hematocrit 39.1 %      MCV 90.3 fL      MCH 29.8 pg      MCHC 33.0 g/dL      RDW 12.6 %      RDW-SD 41.5 fl      MPV 11.0 fL      Platelets 233 10*3/mm3      Neutrophil % 76.4 %      Lymphocyte % 12.0 %      Monocyte % 9.8 %      Eosinophil % 1.0 %      Basophil % 0.4 %      Immature Grans % 0.4 %      Neutrophils, Absolute 8.62 10*3/mm3       Lymphocytes, Absolute 1.35 10*3/mm3      Monocytes, Absolute 1.11 10*3/mm3      Eosinophils, Absolute 0.11 10*3/mm3      Basophils, Absolute 0.04 10*3/mm3      Immature Grans, Absolute 0.04 10*3/mm3      nRBC 0.0 /100 WBC     Urinalysis With Culture If Indicated - Urine, Clean Catch [[id number]]  (Normal) Collected: 12/18/24 0804    Specimen: Urine, Clean Catch Updated: 12/18/24 0826     Color, UA Yellow     Appearance, UA Clear     pH, UA 6.5     Specific Gravity, UA 1.016     Glucose, UA Negative     Ketones, UA Negative     Bilirubin, UA Negative     Blood, UA Negative     Protein, UA Negative     Leuk Esterase, UA Negative     Nitrite, UA Negative     Urobilinogen, UA 1.0 E.U./dL    Narrative:      In absence of clinical symptoms, the presence of pyuria, bacteria, and/or nitrites on the urinalysis result does not correlate with infection.  Urine microscopic not indicated.         CT Abdomen Pelvis With Contrast    Result Date: 12/18/2024  EXAM/TECHNIQUE: CT abdomen and pelvis with IV contrast  INDICATION: Left lower quadrant abdominal pain  COMPARISON: 12/16/2022  DLP: 233.53 mGy.cm. Automated exposure control was utilized to decrease patient radiation dose.  FINDINGS:  Lung bases are clear.  Stable small low-density liver lesions. No suspicious new or enlarging liver lesion. No cholelithiasis or biliary ductal dilatation. Pancreas appears normal. Spleen is unremarkable. Stable small bilateral adrenal gland nodules, likely adenomas.  No solid renal mass. No urolithiasis or hydronephrosis. No focal urinary bladder wall thickening.  Marked wall thickening of the distal descending colon with pronounced surrounding fat stranding and a background of diverticulosis. No free air or drainable organized fluid collection/abscess. The appendix appears normal. No small bowel distention or inflammatory change.  No ascites or free pelvic fluid. Multiple uterine fibroids are present including a 4.5 cm posterior uterine  body fibroid. Presumed small right ovarian corpus luteal cyst. A few small coarse calcifications are present in the right ovary.  Abdominal aorta is nonaneurysmal. No abdominal or pelvic lymphadenopathy.  No acute soft tissue finding. No aggressive osseous lesion.       1.  Moderate to severe acute diverticulitis involving the distal descending colon. No definite evidence of macro perforation. No organized drainable collection/abscess.  2.  Uterine fibroids.    This report was signed and finalized on 12/18/2024 9:14 AM by Dr. Joseluis Mcarthur MD.      ED Course  ED Course as of 12/18/24 0951   Wed Dec 18, 2024   0948 44-year-old female presents to the ED with 3-day history of worsening left lower quadrant abdominal pain, nausea, decreased appetite, fever.  Left lower quadrant tenderness on exam.  White count 11,000.  CT obtained, revealed moderate to severe diverticulitis in the left lower quadrant, no perforation or abscess formation.  Given severity of inflammation on CT, will admit for inpatient treatment for IV antibiotics, fluids and pain control. [AW]      ED Course User Index  [AW] Rajeev Frank MD                                                       Medical Decision Making      Final diagnoses:   Diverticulitis       ED Disposition  ED Disposition       ED Disposition   Decision to Admit    Condition   --    Comment   Level of Care: Med/Surg [1]   Diagnosis: Diverticulitis [630224]   Admitting Physician: CINDY SINGH [[id number]]   Attending Physician: CINDY SINGH [[id number]]   Isolate for COVID?: No [0]   Certification: I Certify That Inpatient Hospital Services Are Medically Necessary For Greater Than 2 Midnights                 No follow-up provider specified.       Medication List      No changes were made to your prescriptions during this visit.            Rajeev Frank MD  12/18/24 0951      Electronically signed by Rajeev Frank MD at 12/18/24 0951       Vital Signs (last  2 days)       Date/Time Temp Temp src Pulse Resp BP Patient Position SpO2    12/19/24 0738 97.6 (36.4) Oral 63 18 114/75 Lying 96    12/19/24 0423 98 (36.7) Oral 69 18 112/76 Lying 93    12/18/24 2345 98.1 (36.7) Oral 81 18 127/84 Lying 97    12/18/24 2028 98.3 (36.8) Oral 85 18 123/75 Lying 95    12/18/24 1641 -- -- 81 18 117/77 Lying 95    12/18/24 1028 98 (36.7) Oral 72 18 131/76 Lying 100    12/18/24 0931 -- -- -- -- 126/88 -- --    12/18/24 0846 -- -- -- -- 129/88 -- --    12/18/24 0826 -- -- -- -- 118/82 -- --    12/18/24 0727 98.4 (36.9) Oral 91 19 152/80 Sitting 97          Current Facility-Administered Medications   Medication Dose Route Frequency Provider Last Rate Last Admin    acetaminophen (TYLENOL) tablet 500 mg  500 mg Oral Q6H PRN Roberto Guillaume MD   500 mg at 12/19/24 0728    azelastine (ASTELIN) nasal spray 2 spray  2 spray Each Nare BID Roberto Guillaume MD   2 spray at 12/19/24 0805    sennosides-docusate (PERICOLACE) 8.6-50 MG per tablet 2 tablet  2 tablet Oral BID PRN Roberto Guillaume MD        And    polyethylene glycol (MIRALAX) packet 17 g  17 g Oral Daily PRN Roberto Guillaume MD        And    bisacodyl (DULCOLAX) EC tablet 5 mg  5 mg Oral Daily PRN Roberto Guillaume MD        And    bisacodyl (DULCOLAX) suppository 10 mg  10 mg Rectal Daily PRN Roberto Guillaume MD        Enoxaparin Sodium (LOVENOX) syringe 40 mg  40 mg Subcutaneous Daily Roberto Guillaume MD   40 mg at 12/18/24 1614    fluticasone (FLONASE) 50 MCG/ACT nasal spray 1 spray  1 spray Nasal Daily Roberto Guillaume MD   1 spray at 12/19/24 0805    HYDROmorphone (DILAUDID) injection 0.5 mg  0.5 mg Intravenous Q4H PRN Roberto Guillaume MD   0.5 mg at 12/19/24 0805    ondansetron (ZOFRAN) 8 mg/50 mL NS IVPB  8 mg Intravenous Q6H PRN Roberto Guillaume MD   8 mg at 12/19/24 1010    ondansetron ODT (ZOFRAN-ODT) disintegrating tablet 8 mg  8 mg Translingual Q8H PRN Roberto Guillaume MD         piperacillin-tazobactam (ZOSYN) 3.375 g IVPB in 100 mL NS MBP (CD)  3.375 g Intravenous Q8H Roberto Guillaume MD   3.375 g at 12/19/24 0805    propranolol LA (INDERAL LA) 24 hr capsule 120 mg  120 mg Oral Nightly Roberto Guillaume MD   120 mg at 12/18/24 2014    rOPINIRole (REQUIP) tablet 1 mg  1 mg Oral Nightly Roberto Guillaume MD   1 mg at 12/18/24 2014    rosuvastatin (CRESTOR) tablet 10 mg  10 mg Oral Nightly Roberto Guillaume MD   10 mg at 12/18/24 2014    sodium chloride 0.9 % flush 10 mL  10 mL Intravenous PRN Roberto Guillaume MD        sodium chloride 0.9 % flush 10 mL  10 mL Intravenous Q12H Roberto Guillaume MD   10 mL at 12/18/24 2013    sodium chloride 0.9 % flush 10 mL  10 mL Intravenous PRN Roberto Guillaume MD        sodium chloride 0.9 % infusion 40 mL  40 mL Intravenous PRN Roberto Guillaume MD        sodium chloride 0.9 % infusion  100 mL/hr Intravenous Continuous Roberto Guillaume  mL/hr at 12/18/24 1241 100 mL/hr at 12/18/24 1241    sodium chloride 0.9 % infusion  100 mL/hr Intravenous Continuous Roberto Guillaume  mL/hr at 12/18/24 2343 100 mL/hr at 12/18/24 2343    tiZANidine (ZANAFLEX) tablet 4 mg  4 mg Oral Nightly PRN Roberto Guillaume MD        topiramate (TOPAMAX) tablet 100 mg  100 mg Oral Daily Roberto Guillaume MD   100 mg at 12/18/24 2014    zolpidem (AMBIEN) tablet 5 mg  5 mg Oral Nightly PRN Roberto Guillaume MD            Physician Progress Notes (last 48 hours)        Roberto Guillaume MD at 12/19/24 0810            Daily Progress Note  Gloria Melgoza  MRN: [id number] LOS: 1    Admit Date: 12/18/2024 12/19/2024 08:10 CST    Subjective:      Chief Complaint:  Chief Complaint   Patient presents with    Abdominal Pain    Nausea    Fever       Interval History:    Reviewed overnight events and nursing notes.   Still with LLQ pain but improving    Review of Systems   Constitutional:  Positive for activity change and appetite change.   HENT:  Negative.     Respiratory: Negative.     Cardiovascular: Negative.    Gastrointestinal:  Positive for abdominal pain.   Genitourinary: Negative.    Musculoskeletal: Negative.    Neurological:  Positive for weakness.   Hematological: Negative.    Psychiatric/Behavioral: Negative.         DIET:  Diet: Liquid; Clear Liquid; Fluid Consistency: Thin (IDDSI 0)    Medications:   sodium chloride, 100 mL/hr, Last Rate: 100 mL/hr (12/18/24 1241)  sodium chloride, 100 mL/hr, Last Rate: 100 mL/hr (12/18/24 6823)      azelastine, 2 spray, Each Nare, BID  enoxaparin, 40 mg, Subcutaneous, Daily  fluticasone, 1 spray, Nasal, Daily  piperacillin-tazobactam, 3.375 g, Intravenous, Q8H  propranolol LA, 120 mg, Oral, Nightly  rOPINIRole, 1 mg, Oral, Nightly  rosuvastatin, 10 mg, Oral, Nightly  sodium chloride, 10 mL, Intravenous, Q12H  topiramate, 100 mg, Oral, Daily        Data:     Code Status:   Code Status and Medical Interventions: CPR (Attempt to Resuscitate); Full Support   Ordered at: 12/18/24 1432     Level Of Support Discussed With:    Patient     Code Status (Patient has no pulse and is not breathing):    CPR (Attempt to Resuscitate)     Medical Interventions (Patient has pulse or is breathing):    Full Support       Family History   Problem Relation Age of Onset    Seizures Mother     Migraines Mother     Cancer Mother         Pre cancerous lesions in the colon    Hypertension Father     Diabetes Father     Breast cancer Paternal Grandmother     Cancer Paternal Grandmother         Breast and Colon Cancer    Cancer Maternal Grandmother         Bladder    Cancer Paternal Grandfather         Lung    Cancer Paternal Uncle         Bladder     Social History     Socioeconomic History    Marital status:    Tobacco Use    Smoking status: Former     Current packs/day: 0.50     Average packs/day: 0.5 packs/day for 20.0 years (10.0 ttl pk-yrs)     Types: Cigarettes    Smokeless tobacco: Never    Tobacco comments:     Doesn’t  smoke cigarettes but have started using vape   Vaping Use    Vaping status: Every Day    Substances: Nicotine, Flavoring   Substance and Sexual Activity    Alcohol use: Yes     Alcohol/week: 2.0 standard drinks of alcohol     Types: 2 Glasses of wine per week    Drug use: Never    Sexual activity: Defer       Labs:    Lab Results (last 72 hours)       Procedure Component Value Units Date/Time    Basic Metabolic Panel [[id number]]  (Abnormal) Collected: 12/19/24 0350    Specimen: Blood Updated: 12/19/24 0446     Glucose 94 mg/dL      BUN 6 mg/dL      Creatinine 0.72 mg/dL      Sodium 138 mmol/L      Potassium 3.8 mmol/L      Chloride 110 mmol/L      CO2 20.0 mmol/L      Calcium 8.1 mg/dL      BUN/Creatinine Ratio 8.3     Anion Gap 8.0 mmol/L      eGFR 105.9 mL/min/1.73     Narrative:      GFR Categories in Chronic Kidney Disease (CKD)      GFR Category          GFR (mL/min/1.73)    Interpretation  G1                     90 or greater         Normal or high (1)  G2                      60-89                Mild decrease (1)  G3a                   45-59                Mild to moderate decrease  G3b                   30-44                Moderate to severe decrease  G4                    15-29                Severe decrease  G5                    14 or less           Kidney failure          (1)In the absence of evidence of kidney disease, neither GFR category G1 or G2 fulfill the criteria for CKD.    eGFR calculation 2021 CKD-EPI creatinine equation, which does not include race as a factor    CBC & Differential [[id number]]  (Normal) Collected: 12/19/24 0350    Specimen: Blood Updated: 12/19/24 0422    Narrative:      The following orders were created for panel order CBC & Differential.  Procedure                               Abnormality         Status                     ---------                               -----------         ------                     CBC Auto Differential[[id number]]        Normal               Final result                 Please view results for these tests on the individual orders.    CBC Auto Differential [[id number]]  (Normal) Collected: 12/19/24 0350    Specimen: Blood Updated: 12/19/24 0422     WBC 6.11 10*3/mm3      RBC 4.17 10*6/mm3      Hemoglobin 12.2 g/dL      Hematocrit 38.7 %      MCV 92.8 fL      MCH 29.3 pg      MCHC 31.5 g/dL      RDW 12.6 %      RDW-SD 42.6 fl      MPV 11.1 fL      Platelets 212 10*3/mm3      Neutrophil % 59.0 %      Lymphocyte % 26.0 %      Monocyte % 11.5 %      Eosinophil % 2.5 %      Basophil % 0.5 %      Immature Grans % 0.5 %      Neutrophils, Absolute 3.61 10*3/mm3      Lymphocytes, Absolute 1.59 10*3/mm3      Monocytes, Absolute 0.70 10*3/mm3      Eosinophils, Absolute 0.15 10*3/mm3      Basophils, Absolute 0.03 10*3/mm3      Immature Grans, Absolute 0.03 10*3/mm3      nRBC 0.0 /100 WBC     Blood Culture - Blood, Arm, Right [[id number]] Collected: 12/18/24 0946    Specimen: Blood from Arm, Right Updated: 12/18/24 1028    Blood Culture - Blood, Arm, Left [[id number]] Collected: 12/18/24 0955    Specimen: Blood from Arm, Left Updated: 12/18/24 1028    Comprehensive Metabolic Panel [[id number]]  (Abnormal) Collected: 12/18/24 0800    Specimen: Blood Updated: 12/18/24 0845     Glucose 98 mg/dL      BUN 8 mg/dL      Creatinine 0.69 mg/dL      Sodium 139 mmol/L      Potassium 3.7 mmol/L      Chloride 110 mmol/L      CO2 17.0 mmol/L      Calcium 8.5 mg/dL      Total Protein 6.5 g/dL      Albumin 3.8 g/dL      ALT (SGPT) 12 U/L      AST (SGOT) 12 U/L      Alkaline Phosphatase 96 U/L      Total Bilirubin 0.7 mg/dL      Globulin 2.7 gm/dL      A/G Ratio 1.4 g/dL      BUN/Creatinine Ratio 11.6     Anion Gap 12.0 mmol/L      eGFR 109.9 mL/min/1.73     Narrative:      GFR Categories in Chronic Kidney Disease (CKD)      GFR Category          GFR (mL/min/1.73)    Interpretation  G1                     90 or greater         Normal or high (1)  G2                      60-89                 Mild decrease (1)  G3a                   45-59                Mild to moderate decrease  G3b                   30-44                Moderate to severe decrease  G4                    15-29                Severe decrease  G5                    14 or less           Kidney failure          (1)In the absence of evidence of kidney disease, neither GFR category G1 or G2 fulfill the criteria for CKD.    eGFR calculation 2021 CKD-EPI creatinine equation, which does not include race as a factor    Magnesium [[id number]]  (Normal) Collected: 12/18/24 0800    Specimen: Blood Updated: 12/18/24 0841     Magnesium 1.9 mg/dL     hCG, Serum, Qualitative [[id number]]  (Normal) Collected: 12/18/24 0800    Specimen: Blood Updated: 12/18/24 0838     HCG Qualitative Negative    Protime-INR [[id number]]  (Normal) Collected: 12/18/24 0800    Specimen: Blood Updated: 12/18/24 0834     Protime 14.3 Seconds      INR 1.06    CBC & Differential [[id number]]  (Abnormal) Collected: 12/18/24 0800    Specimen: Blood Updated: 12/18/24 0828    Narrative:      The following orders were created for panel order CBC & Differential.  Procedure                               Abnormality         Status                     ---------                               -----------         ------                     CBC Auto Differential[[id number]]        Abnormal            Final result                 Please view results for these tests on the individual orders.    CBC Auto Differential [[id number]]  (Abnormal) Collected: 12/18/24 0800    Specimen: Blood Updated: 12/18/24 0828     WBC 11.27 10*3/mm3      RBC 4.33 10*6/mm3      Hemoglobin 12.9 g/dL      Hematocrit 39.1 %      MCV 90.3 fL      MCH 29.8 pg      MCHC 33.0 g/dL      RDW 12.6 %      RDW-SD 41.5 fl      MPV 11.0 fL      Platelets 233 10*3/mm3      Neutrophil % 76.4 %      Lymphocyte % 12.0 %      Monocyte % 9.8 %      Eosinophil % 1.0 %      Basophil % 0.4 %      Immature Grans % 0.4 %      Neutrophils,  "Absolute 8.62 10*3/mm3      Lymphocytes, Absolute 1.35 10*3/mm3      Monocytes, Absolute 1.11 10*3/mm3      Eosinophils, Absolute 0.11 10*3/mm3      Basophils, Absolute 0.04 10*3/mm3      Immature Grans, Absolute 0.04 10*3/mm3      nRBC 0.0 /100 WBC     Urinalysis With Culture If Indicated - Urine, Clean Catch [[id number]]  (Normal) Collected: 24 08    Specimen: Urine, Clean Catch Updated: 24     Color, UA Yellow     Appearance, UA Clear     pH, UA 6.5     Specific Gravity, UA 1.016     Glucose, UA Negative     Ketones, UA Negative     Bilirubin, UA Negative     Blood, UA Negative     Protein, UA Negative     Leuk Esterase, UA Negative     Nitrite, UA Negative     Urobilinogen, UA 1.0 E.U./dL    Narrative:      In absence of clinical symptoms, the presence of pyuria, bacteria, and/or nitrites on the urinalysis result does not correlate with infection.  Urine microscopic not indicated.              Objective:     Vitals: /75 (BP Location: Left arm, Patient Position: Lying)   Pulse 63   Temp 97.6 °F (36.4 °C) (Oral)   Resp 18   Ht 157.5 cm (62\")   Wt 69 kg (152 lb 3.2 oz)   SpO2 96%   BMI 27.84 kg/m²  No intake or output data in the 24 hours ending 24 0810 Temp (24hrs), Av °F (36.7 °C), Min:97.6 °F (36.4 °C), Max:98.3 °F (36.8 °C)      Physical Exam  Vitals and nursing note reviewed.   Constitutional:       Appearance: Normal appearance.   HENT:      Head: Normocephalic and atraumatic.      Nose: Nose normal.      Mouth/Throat:      Mouth: Mucous membranes are moist.   Eyes:      Pupils: Pupils are equal, round, and reactive to light.   Cardiovascular:      Rate and Rhythm: Normal rate and regular rhythm.      Pulses: Normal pulses.      Heart sounds: Normal heart sounds.   Pulmonary:      Effort: Pulmonary effort is normal.      Breath sounds: Normal breath sounds.   Abdominal:      General: Abdomen is flat. Bowel sounds are normal.      Tenderness: There is abdominal " tenderness.   Musculoskeletal:         General: Normal range of motion.   Skin:     General: Skin is warm.      Capillary Refill: Capillary refill takes less than 2 seconds.   Neurological:      General: No focal deficit present.      Mental Status: She is alert.             Assessment and Plan:     Primary Problem:  Diverticulitis  Leukocytosis     Hospital Problem list:    Diverticulitis      PMH:  Past Medical History:   Diagnosis Date    Asthma     Increasing shortness of breath in revent months    Dental disease     Almost all top teeth are crowns    Dizziness     Always attributed to blood pressure    GERD (gastroesophageal reflux disease)     Headache     Hyperlipidemia     Insomnia     Migraine     Muscle spasm of right shoulder     Nosebleed     Sinusitis     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       Treatment Plan:  IV antibiotics to cover diverticulitis  IV fluid resuscitation  IV analgesia  IV antiemetics  Follow clinical course       Disposition: home    Reviewed treatment plans with the patient and/or family.   30 minutes spent in face to face interaction and coordination of care.     Electronically signed by Roberto Guillaume MD on 12/19/2024 at 08:10 CST    Electronically signed by Roberto Guillaume MD at 12/19/24 0814         PRN MEDS    12/18   dilaudid iv x4       zofran iv x2     12/19  dilaudid iv x2  so far today     zofran iv x1

## 2024-12-19 NOTE — PLAN OF CARE
Goal Outcome Evaluation:              Outcome Evaluation: pt A/Ox4, vitals stable. c/o abd pain and nausea, see MAR. Lovenox. IVF and IV abx. up ad lindsay. tolerating clear liquid diet. safety maintained.

## 2024-12-19 NOTE — PLAN OF CARE
Goal Outcome Evaluation:                      Pain meds given x2, nausea meds given x1 safety maintained. No falls or injuries this shift. Call light within reach. Patient resting comfortably in bed at this time. Denies needs.

## 2024-12-19 NOTE — PROGRESS NOTES
Daily Progress Note  Gloria Melgoza  MRN: [id number] LOS: 1    Admit Date: 12/18/2024 12/19/2024 08:10 CST    Subjective:      Chief Complaint:  Chief Complaint   Patient presents with    Abdominal Pain    Nausea    Fever       Interval History:    Reviewed overnight events and nursing notes.   Still with LLQ pain but improving    Review of Systems   Constitutional:  Positive for activity change and appetite change.   HENT: Negative.     Respiratory: Negative.     Cardiovascular: Negative.    Gastrointestinal:  Positive for abdominal pain.   Genitourinary: Negative.    Musculoskeletal: Negative.    Neurological:  Positive for weakness.   Hematological: Negative.    Psychiatric/Behavioral: Negative.         DIET:  Diet: Liquid; Clear Liquid; Fluid Consistency: Thin (IDDSI 0)    Medications:   sodium chloride, 100 mL/hr, Last Rate: 100 mL/hr (12/18/24 1241)  sodium chloride, 100 mL/hr, Last Rate: 100 mL/hr (12/18/24 3343)      azelastine, 2 spray, Each Nare, BID  enoxaparin, 40 mg, Subcutaneous, Daily  fluticasone, 1 spray, Nasal, Daily  piperacillin-tazobactam, 3.375 g, Intravenous, Q8H  propranolol LA, 120 mg, Oral, Nightly  rOPINIRole, 1 mg, Oral, Nightly  rosuvastatin, 10 mg, Oral, Nightly  sodium chloride, 10 mL, Intravenous, Q12H  topiramate, 100 mg, Oral, Daily        Data:     Code Status:   Code Status and Medical Interventions: CPR (Attempt to Resuscitate); Full Support   Ordered at: 12/18/24 1432     Level Of Support Discussed With:    Patient     Code Status (Patient has no pulse and is not breathing):    CPR (Attempt to Resuscitate)     Medical Interventions (Patient has pulse or is breathing):    Full Support       Family History   Problem Relation Age of Onset    Seizures Mother     Migraines Mother     Cancer Mother         Pre cancerous lesions in the colon    Hypertension Father     Diabetes Father     Breast cancer Paternal Grandmother     Cancer Paternal Grandmother         Breast and Colon  Cancer    Cancer Maternal Grandmother         Bladder    Cancer Paternal Grandfather         Lung    Cancer Paternal Uncle         Bladder     Social History     Socioeconomic History    Marital status:    Tobacco Use    Smoking status: Former     Current packs/day: 0.50     Average packs/day: 0.5 packs/day for 20.0 years (10.0 ttl pk-yrs)     Types: Cigarettes    Smokeless tobacco: Never    Tobacco comments:     Doesn’t smoke cigarettes but have started using vape   Vaping Use    Vaping status: Every Day    Substances: Nicotine, Flavoring   Substance and Sexual Activity    Alcohol use: Yes     Alcohol/week: 2.0 standard drinks of alcohol     Types: 2 Glasses of wine per week    Drug use: Never    Sexual activity: Defer       Labs:    Lab Results (last 72 hours)       Procedure Component Value Units Date/Time    Basic Metabolic Panel [[id number]]  (Abnormal) Collected: 12/19/24 0350    Specimen: Blood Updated: 12/19/24 0446     Glucose 94 mg/dL      BUN 6 mg/dL      Creatinine 0.72 mg/dL      Sodium 138 mmol/L      Potassium 3.8 mmol/L      Chloride 110 mmol/L      CO2 20.0 mmol/L      Calcium 8.1 mg/dL      BUN/Creatinine Ratio 8.3     Anion Gap 8.0 mmol/L      eGFR 105.9 mL/min/1.73     Narrative:      GFR Categories in Chronic Kidney Disease (CKD)      GFR Category          GFR (mL/min/1.73)    Interpretation  G1                     90 or greater         Normal or high (1)  G2                      60-89                Mild decrease (1)  G3a                   45-59                Mild to moderate decrease  G3b                   30-44                Moderate to severe decrease  G4                    15-29                Severe decrease  G5                    14 or less           Kidney failure          (1)In the absence of evidence of kidney disease, neither GFR category G1 or G2 fulfill the criteria for CKD.    eGFR calculation 2021 CKD-EPI creatinine equation, which does not include race as a factor    CBC  & Differential [[id number]]  (Normal) Collected: 12/19/24 0350    Specimen: Blood Updated: 12/19/24 0422    Narrative:      The following orders were created for panel order CBC & Differential.  Procedure                               Abnormality         Status                     ---------                               -----------         ------                     CBC Auto Differential[[id number]]        Normal              Final result                 Please view results for these tests on the individual orders.    CBC Auto Differential [[id number]]  (Normal) Collected: 12/19/24 0350    Specimen: Blood Updated: 12/19/24 0422     WBC 6.11 10*3/mm3      RBC 4.17 10*6/mm3      Hemoglobin 12.2 g/dL      Hematocrit 38.7 %      MCV 92.8 fL      MCH 29.3 pg      MCHC 31.5 g/dL      RDW 12.6 %      RDW-SD 42.6 fl      MPV 11.1 fL      Platelets 212 10*3/mm3      Neutrophil % 59.0 %      Lymphocyte % 26.0 %      Monocyte % 11.5 %      Eosinophil % 2.5 %      Basophil % 0.5 %      Immature Grans % 0.5 %      Neutrophils, Absolute 3.61 10*3/mm3      Lymphocytes, Absolute 1.59 10*3/mm3      Monocytes, Absolute 0.70 10*3/mm3      Eosinophils, Absolute 0.15 10*3/mm3      Basophils, Absolute 0.03 10*3/mm3      Immature Grans, Absolute 0.03 10*3/mm3      nRBC 0.0 /100 WBC     Blood Culture - Blood, Arm, Right [[id number]] Collected: 12/18/24 0946    Specimen: Blood from Arm, Right Updated: 12/18/24 1028    Blood Culture - Blood, Arm, Left [[id number]] Collected: 12/18/24 0955    Specimen: Blood from Arm, Left Updated: 12/18/24 1028    Comprehensive Metabolic Panel [[id number]]  (Abnormal) Collected: 12/18/24 0800    Specimen: Blood Updated: 12/18/24 0845     Glucose 98 mg/dL      BUN 8 mg/dL      Creatinine 0.69 mg/dL      Sodium 139 mmol/L      Potassium 3.7 mmol/L      Chloride 110 mmol/L      CO2 17.0 mmol/L      Calcium 8.5 mg/dL      Total Protein 6.5 g/dL      Albumin 3.8 g/dL      ALT (SGPT) 12 U/L      AST (SGOT) 12 U/L       Alkaline Phosphatase 96 U/L      Total Bilirubin 0.7 mg/dL      Globulin 2.7 gm/dL      A/G Ratio 1.4 g/dL      BUN/Creatinine Ratio 11.6     Anion Gap 12.0 mmol/L      eGFR 109.9 mL/min/1.73     Narrative:      GFR Categories in Chronic Kidney Disease (CKD)      GFR Category          GFR (mL/min/1.73)    Interpretation  G1                     90 or greater         Normal or high (1)  G2                      60-89                Mild decrease (1)  G3a                   45-59                Mild to moderate decrease  G3b                   30-44                Moderate to severe decrease  G4                    15-29                Severe decrease  G5                    14 or less           Kidney failure          (1)In the absence of evidence of kidney disease, neither GFR category G1 or G2 fulfill the criteria for CKD.    eGFR calculation 2021 CKD-EPI creatinine equation, which does not include race as a factor    Magnesium [[id number]]  (Normal) Collected: 12/18/24 0800    Specimen: Blood Updated: 12/18/24 0841     Magnesium 1.9 mg/dL     hCG, Serum, Qualitative [[id number]]  (Normal) Collected: 12/18/24 0800    Specimen: Blood Updated: 12/18/24 0838     HCG Qualitative Negative    Protime-INR [[id number]]  (Normal) Collected: 12/18/24 0800    Specimen: Blood Updated: 12/18/24 0834     Protime 14.3 Seconds      INR 1.06    CBC & Differential [[id number]]  (Abnormal) Collected: 12/18/24 0800    Specimen: Blood Updated: 12/18/24 0828    Narrative:      The following orders were created for panel order CBC & Differential.  Procedure                               Abnormality         Status                     ---------                               -----------         ------                     CBC Auto Differential[[id number]]        Abnormal            Final result                 Please view results for these tests on the individual orders.    CBC Auto Differential [[id number]]  (Abnormal) Collected: 12/18/24 0800     "Specimen: Blood Updated: 24     WBC 11.27 10*3/mm3      RBC 4.33 10*6/mm3      Hemoglobin 12.9 g/dL      Hematocrit 39.1 %      MCV 90.3 fL      MCH 29.8 pg      MCHC 33.0 g/dL      RDW 12.6 %      RDW-SD 41.5 fl      MPV 11.0 fL      Platelets 233 10*3/mm3      Neutrophil % 76.4 %      Lymphocyte % 12.0 %      Monocyte % 9.8 %      Eosinophil % 1.0 %      Basophil % 0.4 %      Immature Grans % 0.4 %      Neutrophils, Absolute 8.62 10*3/mm3      Lymphocytes, Absolute 1.35 10*3/mm3      Monocytes, Absolute 1.11 10*3/mm3      Eosinophils, Absolute 0.11 10*3/mm3      Basophils, Absolute 0.04 10*3/mm3      Immature Grans, Absolute 0.04 10*3/mm3      nRBC 0.0 /100 WBC     Urinalysis With Culture If Indicated - Urine, Clean Catch [[id number]]  (Normal) Collected: 24    Specimen: Urine, Clean Catch Updated: 24     Color, UA Yellow     Appearance, UA Clear     pH, UA 6.5     Specific Gravity, UA 1.016     Glucose, UA Negative     Ketones, UA Negative     Bilirubin, UA Negative     Blood, UA Negative     Protein, UA Negative     Leuk Esterase, UA Negative     Nitrite, UA Negative     Urobilinogen, UA 1.0 E.U./dL    Narrative:      In absence of clinical symptoms, the presence of pyuria, bacteria, and/or nitrites on the urinalysis result does not correlate with infection.  Urine microscopic not indicated.              Objective:     Vitals: /75 (BP Location: Left arm, Patient Position: Lying)   Pulse 63   Temp 97.6 °F (36.4 °C) (Oral)   Resp 18   Ht 157.5 cm (62\")   Wt 69 kg (152 lb 3.2 oz)   SpO2 96%   BMI 27.84 kg/m²  No intake or output data in the 24 hours ending 24 0810 Temp (24hrs), Av °F (36.7 °C), Min:97.6 °F (36.4 °C), Max:98.3 °F (36.8 °C)      Physical Exam  Vitals and nursing note reviewed.   Constitutional:       Appearance: Normal appearance.   HENT:      Head: Normocephalic and atraumatic.      Nose: Nose normal.      Mouth/Throat:      Mouth: Mucous " membranes are moist.   Eyes:      Pupils: Pupils are equal, round, and reactive to light.   Cardiovascular:      Rate and Rhythm: Normal rate and regular rhythm.      Pulses: Normal pulses.      Heart sounds: Normal heart sounds.   Pulmonary:      Effort: Pulmonary effort is normal.      Breath sounds: Normal breath sounds.   Abdominal:      General: Abdomen is flat. Bowel sounds are normal.      Tenderness: There is abdominal tenderness.   Musculoskeletal:         General: Normal range of motion.   Skin:     General: Skin is warm.      Capillary Refill: Capillary refill takes less than 2 seconds.   Neurological:      General: No focal deficit present.      Mental Status: She is alert.             Assessment and Plan:     Primary Problem:  Diverticulitis  Leukocytosis     Hospital Problem list:    Diverticulitis      PMH:  Past Medical History:   Diagnosis Date    Asthma     Increasing shortness of breath in revent months    Dental disease     Almost all top teeth are crowns    Dizziness     Always attributed to blood pressure    GERD (gastroesophageal reflux disease)     Headache     Hyperlipidemia     Insomnia     Migraine     Muscle spasm of right shoulder     Nosebleed     Sinusitis     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       Treatment Plan:  IV antibiotics to cover diverticulitis  IV fluid resuscitation  IV analgesia  IV antiemetics  Follow clinical course       Disposition: home    Reviewed treatment plans with the patient and/or family.   30 minutes spent in face to face interaction and coordination of care.     Electronically signed by Roberto Guillaume MD on 12/19/2024 at 08:10 CST

## 2024-12-20 LAB
ANION GAP SERPL CALCULATED.3IONS-SCNC: 10 MMOL/L (ref 5–15)
BASOPHILS # BLD AUTO: 0.03 10*3/MM3 (ref 0–0.2)
BASOPHILS NFR BLD AUTO: 0.5 % (ref 0–1.5)
BUN SERPL-MCNC: 4 MG/DL (ref 6–20)
BUN/CREAT SERPL: 5.7 (ref 7–25)
CALCIUM SPEC-SCNC: 8.4 MG/DL (ref 8.6–10.5)
CHLORIDE SERPL-SCNC: 112 MMOL/L (ref 98–107)
CO2 SERPL-SCNC: 18 MMOL/L (ref 22–29)
CREAT SERPL-MCNC: 0.7 MG/DL (ref 0.57–1)
DEPRECATED RDW RBC AUTO: 43 FL (ref 37–54)
EGFRCR SERPLBLD CKD-EPI 2021: 109.5 ML/MIN/1.73
EOSINOPHIL # BLD AUTO: 0.16 10*3/MM3 (ref 0–0.4)
EOSINOPHIL NFR BLD AUTO: 2.5 % (ref 0.3–6.2)
ERYTHROCYTE [DISTWIDTH] IN BLOOD BY AUTOMATED COUNT: 12.6 % (ref 12.3–15.4)
GLUCOSE SERPL-MCNC: 113 MG/DL (ref 65–99)
HCT VFR BLD AUTO: 38.6 % (ref 34–46.6)
HGB BLD-MCNC: 12.3 G/DL (ref 12–15.9)
IMM GRANULOCYTES # BLD AUTO: 0.02 10*3/MM3 (ref 0–0.05)
IMM GRANULOCYTES NFR BLD AUTO: 0.3 % (ref 0–0.5)
LYMPHOCYTES # BLD AUTO: 1.41 10*3/MM3 (ref 0.7–3.1)
LYMPHOCYTES NFR BLD AUTO: 22 % (ref 19.6–45.3)
MCH RBC QN AUTO: 29.6 PG (ref 26.6–33)
MCHC RBC AUTO-ENTMCNC: 31.9 G/DL (ref 31.5–35.7)
MCV RBC AUTO: 93 FL (ref 79–97)
MONOCYTES # BLD AUTO: 0.52 10*3/MM3 (ref 0.1–0.9)
MONOCYTES NFR BLD AUTO: 8.1 % (ref 5–12)
NEUTROPHILS NFR BLD AUTO: 4.27 10*3/MM3 (ref 1.7–7)
NEUTROPHILS NFR BLD AUTO: 66.6 % (ref 42.7–76)
NRBC BLD AUTO-RTO: 0 /100 WBC (ref 0–0.2)
PLATELET # BLD AUTO: 238 10*3/MM3 (ref 140–450)
PMV BLD AUTO: 11.3 FL (ref 6–12)
POTASSIUM SERPL-SCNC: 3.8 MMOL/L (ref 3.5–5.2)
RBC # BLD AUTO: 4.15 10*6/MM3 (ref 3.77–5.28)
SODIUM SERPL-SCNC: 140 MMOL/L (ref 136–145)
WBC NRBC COR # BLD AUTO: 6.41 10*3/MM3 (ref 3.4–10.8)

## 2024-12-20 PROCEDURE — 25010000002 HYDROMORPHONE PER 4 MG: Performed by: FAMILY MEDICINE

## 2024-12-20 PROCEDURE — 80048 BASIC METABOLIC PNL TOTAL CA: CPT | Performed by: FAMILY MEDICINE

## 2024-12-20 PROCEDURE — 25010000002 ONDANSETRON PER 1 MG: Performed by: FAMILY MEDICINE

## 2024-12-20 PROCEDURE — 25010000002 METRONIDAZOLE 500 MG/100ML SOLUTION: Performed by: FAMILY MEDICINE

## 2024-12-20 PROCEDURE — 25010000002 CEFTRIAXONE PER 250 MG: Performed by: FAMILY MEDICINE

## 2024-12-20 PROCEDURE — 25010000002 ENOXAPARIN PER 10 MG: Performed by: FAMILY MEDICINE

## 2024-12-20 PROCEDURE — 85025 COMPLETE CBC W/AUTO DIFF WBC: CPT | Performed by: FAMILY MEDICINE

## 2024-12-20 RX ORDER — FAMOTIDINE 20 MG/1
20 TABLET, FILM COATED ORAL 2 TIMES DAILY PRN
Status: DISCONTINUED | OUTPATIENT
Start: 2024-12-20 | End: 2024-12-23 | Stop reason: HOSPADM

## 2024-12-20 RX ORDER — METRONIDAZOLE 500 MG/100ML
500 INJECTION, SOLUTION INTRAVENOUS EVERY 8 HOURS
Status: DISCONTINUED | OUTPATIENT
Start: 2024-12-20 | End: 2024-12-21

## 2024-12-20 RX ADMIN — HYDROMORPHONE HYDROCHLORIDE 0.5 MG: 1 INJECTION, SOLUTION INTRAMUSCULAR; INTRAVENOUS; SUBCUTANEOUS at 17:33

## 2024-12-20 RX ADMIN — HYDROMORPHONE HYDROCHLORIDE 0.5 MG: 1 INJECTION, SOLUTION INTRAMUSCULAR; INTRAVENOUS; SUBCUTANEOUS at 21:38

## 2024-12-20 RX ADMIN — METRONIDAZOLE 500 MG: 500 INJECTION, SOLUTION INTRAVENOUS at 16:30

## 2024-12-20 RX ADMIN — TOPIRAMATE 100 MG: 100 TABLET, FILM COATED ORAL at 08:36

## 2024-12-20 RX ADMIN — HYDROMORPHONE HYDROCHLORIDE 0.5 MG: 1 INJECTION, SOLUTION INTRAMUSCULAR; INTRAVENOUS; SUBCUTANEOUS at 13:45

## 2024-12-20 RX ADMIN — ONDANSETRON HYDROCHLORIDE 8 MG: 2 INJECTION INTRAMUSCULAR; INTRAVENOUS at 17:33

## 2024-12-20 RX ADMIN — AZELASTINE HYDROCHLORIDE 2 SPRAY: 137 SPRAY, METERED NASAL at 20:23

## 2024-12-20 RX ADMIN — ONDANSETRON HYDROCHLORIDE 8 MG: 2 INJECTION INTRAMUSCULAR; INTRAVENOUS at 05:40

## 2024-12-20 RX ADMIN — PROPRANOLOL HYDROCHLORIDE 120 MG: 60 CAPSULE, EXTENDED RELEASE ORAL at 20:22

## 2024-12-20 RX ADMIN — CEFTRIAXONE SODIUM 2000 MG: 2 INJECTION, POWDER, FOR SOLUTION INTRAMUSCULAR; INTRAVENOUS at 08:36

## 2024-12-20 RX ADMIN — ACETAMINOPHEN 500 MG: 500 TABLET, FILM COATED ORAL at 08:36

## 2024-12-20 RX ADMIN — HYDROMORPHONE HYDROCHLORIDE 0.5 MG: 1 INJECTION, SOLUTION INTRAMUSCULAR; INTRAVENOUS; SUBCUTANEOUS at 01:34

## 2024-12-20 RX ADMIN — HYDROMORPHONE HYDROCHLORIDE 0.5 MG: 1 INJECTION, SOLUTION INTRAMUSCULAR; INTRAVENOUS; SUBCUTANEOUS at 05:40

## 2024-12-20 RX ADMIN — HYDROMORPHONE HYDROCHLORIDE 0.5 MG: 1 INJECTION, SOLUTION INTRAMUSCULAR; INTRAVENOUS; SUBCUTANEOUS at 10:07

## 2024-12-20 RX ADMIN — ENOXAPARIN SODIUM 40 MG: 100 INJECTION SUBCUTANEOUS at 16:30

## 2024-12-20 RX ADMIN — ROPINIROLE HYDROCHLORIDE 1 MG: 1 TABLET, FILM COATED ORAL at 20:22

## 2024-12-20 RX ADMIN — ZOLPIDEM TARTRATE 5 MG: 5 TABLET, FILM COATED ORAL at 21:38

## 2024-12-20 RX ADMIN — ROSUVASTATIN 10 MG: 10 TABLET, FILM COATED ORAL at 20:22

## 2024-12-20 RX ADMIN — Medication 10 ML: at 20:23

## 2024-12-20 RX ADMIN — FAMOTIDINE 20 MG: 20 TABLET, FILM COATED ORAL at 21:38

## 2024-12-20 RX ADMIN — METRONIDAZOLE 500 MG: 500 INJECTION, SOLUTION INTRAVENOUS at 10:07

## 2024-12-20 RX ADMIN — Medication 10 ML: at 08:37

## 2024-12-20 NOTE — PLAN OF CARE
Goal Outcome Evaluation:         Pain meds given x2, zofran given x1. IV antibiotics given. Safety maintained. No falls or injuries this shift. Call light within reach. Resting comfortably in bed. Denies needs at this time.

## 2024-12-20 NOTE — PLAN OF CARE
Goal Outcome Evaluation:  Plan of Care Reviewed With: patient        Progress: no change  Outcome Evaluation: IV abx changed. IV pain medication given twice. IV nausea given once. Safety maintained.

## 2024-12-20 NOTE — PROGRESS NOTES
Daily Progress Note  Gloria Melgoza  MRN: [id number] LOS: 2    Admit Date: 12/18/2024 12/20/2024 06:14 CST    Subjective:      Chief Complaint:  Chief Complaint   Patient presents with    Abdominal Pain    Nausea    Fever       Interval History:    Reviewed overnight events and nursing notes.   Still with LLQ pain but improving  Desires to Advance diet    Review of Systems   Constitutional:  Positive for activity change and appetite change.   HENT: Negative.     Respiratory: Negative.     Cardiovascular: Negative.    Gastrointestinal:  Positive for abdominal pain.   Genitourinary: Negative.    Musculoskeletal: Negative.    Neurological:  Positive for weakness.   Hematological: Negative.    Psychiatric/Behavioral: Negative.         DIET:  Diet: Liquid; Clear Liquid; Fluid Consistency: Thin (IDDSI 0)    Medications:        azelastine, 2 spray, Each Nare, BID  enoxaparin, 40 mg, Subcutaneous, Daily  fluticasone, 1 spray, Nasal, Daily  piperacillin-tazobactam, 3.375 g, Intravenous, Q8H  propranolol LA, 120 mg, Oral, Nightly  rOPINIRole, 1 mg, Oral, Nightly  rosuvastatin, 10 mg, Oral, Nightly  sodium chloride, 10 mL, Intravenous, Q12H  topiramate, 100 mg, Oral, Daily        Data:     Code Status:   Code Status and Medical Interventions: CPR (Attempt to Resuscitate); Full Support   Ordered at: 12/18/24 1432     Level Of Support Discussed With:    Patient     Code Status (Patient has no pulse and is not breathing):    CPR (Attempt to Resuscitate)     Medical Interventions (Patient has pulse or is breathing):    Full Support       Family History   Problem Relation Age of Onset    Seizures Mother     Migraines Mother     Cancer Mother         Pre cancerous lesions in the colon    Hypertension Father     Diabetes Father     Breast cancer Paternal Grandmother     Cancer Paternal Grandmother         Breast and Colon Cancer    Cancer Maternal Grandmother         Bladder    Cancer Paternal Grandfather         Lung     Cancer Paternal Uncle         Bladder     Social History     Socioeconomic History    Marital status:    Tobacco Use    Smoking status: Former     Current packs/day: 0.50     Average packs/day: 0.5 packs/day for 20.0 years (10.0 ttl pk-yrs)     Types: Cigarettes    Smokeless tobacco: Never    Tobacco comments:     Doesn’t smoke cigarettes but have started using vape   Vaping Use    Vaping status: Every Day    Substances: Nicotine, Flavoring   Substance and Sexual Activity    Alcohol use: Yes     Alcohol/week: 2.0 standard drinks of alcohol     Types: 2 Glasses of wine per week    Drug use: Never    Sexual activity: Defer       Labs:    Lab Results (last 72 hours)       Procedure Component Value Units Date/Time    Basic Metabolic Panel [[id number]]  (Abnormal) Collected: 12/19/24 0350    Specimen: Blood Updated: 12/19/24 0446     Glucose 94 mg/dL      BUN 6 mg/dL      Creatinine 0.72 mg/dL      Sodium 138 mmol/L      Potassium 3.8 mmol/L      Chloride 110 mmol/L      CO2 20.0 mmol/L      Calcium 8.1 mg/dL      BUN/Creatinine Ratio 8.3     Anion Gap 8.0 mmol/L      eGFR 105.9 mL/min/1.73     Narrative:      GFR Categories in Chronic Kidney Disease (CKD)      GFR Category          GFR (mL/min/1.73)    Interpretation  G1                     90 or greater         Normal or high (1)  G2                      60-89                Mild decrease (1)  G3a                   45-59                Mild to moderate decrease  G3b                   30-44                Moderate to severe decrease  G4                    15-29                Severe decrease  G5                    14 or less           Kidney failure          (1)In the absence of evidence of kidney disease, neither GFR category G1 or G2 fulfill the criteria for CKD.    eGFR calculation 2021 CKD-EPI creatinine equation, which does not include race as a factor    CBC & Differential [[id number]]  (Normal) Collected: 12/19/24 0350    Specimen: Blood Updated: 12/19/24  0422    Narrative:      The following orders were created for panel order CBC & Differential.  Procedure                               Abnormality         Status                     ---------                               -----------         ------                     CBC Auto Differential[[id number]]        Normal              Final result                 Please view results for these tests on the individual orders.    CBC Auto Differential [[id number]]  (Normal) Collected: 12/19/24 0350    Specimen: Blood Updated: 12/19/24 0422     WBC 6.11 10*3/mm3      RBC 4.17 10*6/mm3      Hemoglobin 12.2 g/dL      Hematocrit 38.7 %      MCV 92.8 fL      MCH 29.3 pg      MCHC 31.5 g/dL      RDW 12.6 %      RDW-SD 42.6 fl      MPV 11.1 fL      Platelets 212 10*3/mm3      Neutrophil % 59.0 %      Lymphocyte % 26.0 %      Monocyte % 11.5 %      Eosinophil % 2.5 %      Basophil % 0.5 %      Immature Grans % 0.5 %      Neutrophils, Absolute 3.61 10*3/mm3      Lymphocytes, Absolute 1.59 10*3/mm3      Monocytes, Absolute 0.70 10*3/mm3      Eosinophils, Absolute 0.15 10*3/mm3      Basophils, Absolute 0.03 10*3/mm3      Immature Grans, Absolute 0.03 10*3/mm3      nRBC 0.0 /100 WBC     Blood Culture - Blood, Arm, Right [[id number]] Collected: 12/18/24 0946    Specimen: Blood from Arm, Right Updated: 12/18/24 1028    Blood Culture - Blood, Arm, Left [[id number]] Collected: 12/18/24 0955    Specimen: Blood from Arm, Left Updated: 12/18/24 1028    Comprehensive Metabolic Panel [[id number]]  (Abnormal) Collected: 12/18/24 0800    Specimen: Blood Updated: 12/18/24 0845     Glucose 98 mg/dL      BUN 8 mg/dL      Creatinine 0.69 mg/dL      Sodium 139 mmol/L      Potassium 3.7 mmol/L      Chloride 110 mmol/L      CO2 17.0 mmol/L      Calcium 8.5 mg/dL      Total Protein 6.5 g/dL      Albumin 3.8 g/dL      ALT (SGPT) 12 U/L      AST (SGOT) 12 U/L      Alkaline Phosphatase 96 U/L      Total Bilirubin 0.7 mg/dL      Globulin 2.7 gm/dL      A/G Ratio  1.4 g/dL      BUN/Creatinine Ratio 11.6     Anion Gap 12.0 mmol/L      eGFR 109.9 mL/min/1.73     Narrative:      GFR Categories in Chronic Kidney Disease (CKD)      GFR Category          GFR (mL/min/1.73)    Interpretation  G1                     90 or greater         Normal or high (1)  G2                      60-89                Mild decrease (1)  G3a                   45-59                Mild to moderate decrease  G3b                   30-44                Moderate to severe decrease  G4                    15-29                Severe decrease  G5                    14 or less           Kidney failure          (1)In the absence of evidence of kidney disease, neither GFR category G1 or G2 fulfill the criteria for CKD.    eGFR calculation 2021 CKD-EPI creatinine equation, which does not include race as a factor    Magnesium [[id number]]  (Normal) Collected: 12/18/24 0800    Specimen: Blood Updated: 12/18/24 0841     Magnesium 1.9 mg/dL     hCG, Serum, Qualitative [[id number]]  (Normal) Collected: 12/18/24 0800    Specimen: Blood Updated: 12/18/24 0838     HCG Qualitative Negative    Protime-INR [[id number]]  (Normal) Collected: 12/18/24 0800    Specimen: Blood Updated: 12/18/24 0834     Protime 14.3 Seconds      INR 1.06    CBC & Differential [[id number]]  (Abnormal) Collected: 12/18/24 0800    Specimen: Blood Updated: 12/18/24 0828    Narrative:      The following orders were created for panel order CBC & Differential.  Procedure                               Abnormality         Status                     ---------                               -----------         ------                     CBC Auto Differential[[id number]]        Abnormal            Final result                 Please view results for these tests on the individual orders.    CBC Auto Differential [[id number]]  (Abnormal) Collected: 12/18/24 0800    Specimen: Blood Updated: 12/18/24 0828     WBC 11.27 10*3/mm3      RBC 4.33 10*6/mm3      Hemoglobin  "12.9 g/dL      Hematocrit 39.1 %      MCV 90.3 fL      MCH 29.8 pg      MCHC 33.0 g/dL      RDW 12.6 %      RDW-SD 41.5 fl      MPV 11.0 fL      Platelets 233 10*3/mm3      Neutrophil % 76.4 %      Lymphocyte % 12.0 %      Monocyte % 9.8 %      Eosinophil % 1.0 %      Basophil % 0.4 %      Immature Grans % 0.4 %      Neutrophils, Absolute 8.62 10*3/mm3      Lymphocytes, Absolute 1.35 10*3/mm3      Monocytes, Absolute 1.11 10*3/mm3      Eosinophils, Absolute 0.11 10*3/mm3      Basophils, Absolute 0.04 10*3/mm3      Immature Grans, Absolute 0.04 10*3/mm3      nRBC 0.0 /100 WBC     Urinalysis With Culture If Indicated - Urine, Clean Catch [[id number]]  (Normal) Collected: 24    Specimen: Urine, Clean Catch Updated: 24     Color, UA Yellow     Appearance, UA Clear     pH, UA 6.5     Specific Gravity, UA 1.016     Glucose, UA Negative     Ketones, UA Negative     Bilirubin, UA Negative     Blood, UA Negative     Protein, UA Negative     Leuk Esterase, UA Negative     Nitrite, UA Negative     Urobilinogen, UA 1.0 E.U./dL    Narrative:      In absence of clinical symptoms, the presence of pyuria, bacteria, and/or nitrites on the urinalysis result does not correlate with infection.  Urine microscopic not indicated.              Objective:     Vitals: /74 (BP Location: Right arm, Patient Position: Lying)   Pulse 66   Temp 98.2 °F (36.8 °C) (Oral)   Resp 16   Ht 157.5 cm (62\")   Wt 69 kg (152 lb 3.2 oz)   SpO2 96%   BMI 27.84 kg/m²  No intake or output data in the 24 hours ending 24 0614 Temp (24hrs), Av.8 °F (36.6 °C), Min:97.6 °F (36.4 °C), Max:98.2 °F (36.8 °C)      Physical Exam  Vitals and nursing note reviewed.   Constitutional:       Appearance: Normal appearance.   HENT:      Head: Normocephalic and atraumatic.      Nose: Nose normal.      Mouth/Throat:      Mouth: Mucous membranes are moist.   Eyes:      Pupils: Pupils are equal, round, and reactive to light. "   Cardiovascular:      Rate and Rhythm: Normal rate and regular rhythm.      Pulses: Normal pulses.      Heart sounds: Normal heart sounds.   Pulmonary:      Effort: Pulmonary effort is normal.      Breath sounds: Normal breath sounds.   Abdominal:      General: Abdomen is flat. Bowel sounds are normal.      Tenderness: There is abdominal tenderness.   Musculoskeletal:         General: Normal range of motion.   Skin:     General: Skin is warm.      Capillary Refill: Capillary refill takes less than 2 seconds.   Neurological:      General: No focal deficit present.      Mental Status: She is alert.             Assessment and Plan:     Primary Problem:  Diverticulitis  Leukocytosis     Hospital Problem list:    Diverticulitis      PMH:  Past Medical History:   Diagnosis Date    Asthma     Increasing shortness of breath in revent months    Dental disease     Almost all top teeth are crowns    Dizziness     Always attributed to blood pressure    GERD (gastroesophageal reflux disease)     Headache     Hyperlipidemia     Insomnia     Migraine     Muscle spasm of right shoulder     Nosebleed     Sinusitis     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       Treatment Plan:  IV antibiotics to cover diverticulitis  IV fluid resuscitation  IV analgesia  IV antiemetics  Follow clinical course   ADAT    Disposition: home    Reviewed treatment plans with the patient and/or family.   30 minutes spent in face to face interaction and coordination of care.     Electronically signed by Roberto Guillaume MD on 12/20/2024 at 06:14 CST

## 2024-12-20 NOTE — PAYOR COMM NOTE
"Gloria Kiran (44 y.o. Female)    [id number]  Cont stay   Please review for additional  days      [facility] phone   fax                Date of Birth   1980    Social Security Number       Address   [address] [place] [id number]    Home Phone   [phone]    MRN   [id number]       Caodaism   Zoroastrian    Marital Status                               Admission Date   12/18/24    Admission Type   Emergency    Admitting Provider   Roberto Guillaume MD    Attending Provider   Roberto Guillaume MD    Department, Room/Bed   [facility] 3C, 376/1       Discharge Date       Discharge Disposition       Discharge Destination                                 Attending Provider: Roberto Guillaume MD    Allergies: Sulfa Antibiotics, Wound Dressing Adhesive    Isolation: None   Infection: None   Code Status: CPR    Ht: 157.5 cm (62\")   Wt: 68.9 kg (152 lb)    Admission Cmt: None   Principal Problem: Diverticulitis [K57.92]                   Active Insurance as of 12/18/2024       Primary Coverage       Payor Plan Insurance Group Employer/Plan Group    [facility] [employer] EM[employer] [id number]       Payor Plan Address Payor Plan Phone Number Payor Plan Fax Number Effective Dates    [address] [phone]  8/1/2022 - None Entered    [place] [address]         Subscriber Name Subscriber Birth Date Member ID       GLORIA KIRAN 1980 [id number]                     Emergency Contacts        (Rel.) Home Phone Work Phone Mobile Phone    KiranDavid ferrer (Spouse) [phone] -- [phone]    MyahCrista (Mother) [phone] -- --              Vital Signs (last day)       Date/Time Temp Temp src Pulse Resp BP Patient Position SpO2    12/20/24 0745 98.1 (36.7) Oral 74 16 108/73 Lying 95    12/20/24 0450 98.2 (36.8) Oral 66 16 120/74 Lying 96    12/19/24 2010 97.6 (36.4) Oral 73 16 119/74 Lying 96    " 12/19/24 1558 97.6 (36.4) Oral 63 16 122/69 Lying 99    12/19/24 1145 97.8 (36.6) Oral 64 18 123/76 Lying 98    12/19/24 0738 97.6 (36.4) Oral 63 18 114/75 Lying 96    12/19/24 0423 98 (36.7) Oral 69 18 112/76 Lying 93          Current Facility-Administered Medications   Medication Dose Route Frequency Provider Last Rate Last Admin    acetaminophen (TYLENOL) tablet 500 mg  500 mg Oral Q6H PRN Roberto Guillaume MD   500 mg at 12/19/24 2302    azelastine (ASTELIN) nasal spray 2 spray  2 spray Each Nare BID Roberto Guillaume MD   2 spray at 12/19/24 2026    sennosides-docusate (PERICOLACE) 8.6-50 MG per tablet 2 tablet  2 tablet Oral BID PRN Roberto Guillaume MD        And    polyethylene glycol (MIRALAX) packet 17 g  17 g Oral Daily PRN Roberto Guillaume MD        And    bisacodyl (DULCOLAX) EC tablet 5 mg  5 mg Oral Daily PRN Roberto Guillaume MD        And    bisacodyl (DULCOLAX) suppository 10 mg  10 mg Rectal Daily PRN Roberto Guillaume MD        cefTRIAXone (ROCEPHIN) 2,000 mg in sodium chloride 0.9 % 100 mL MBP  2,000 mg Intravenous Q24H Roberto Guillaume MD        Enoxaparin Sodium (LOVENOX) syringe 40 mg  40 mg Subcutaneous Daily Roberto Guillaume MD   40 mg at 12/19/24 1713    fluticasone (FLONASE) 50 MCG/ACT nasal spray 1 spray  1 spray Nasal Daily Roberto Guillaume MD   1 spray at 12/19/24 0805    HYDROmorphone (DILAUDID) injection 0.5 mg  0.5 mg Intravenous Q4H PRN Roberto Guillaume MD   0.5 mg at 12/20/24 0540    metroNIDAZOLE (FLAGYL) IVPB 500 mg  500 mg Intravenous Q8H Roberto Guillaume MD        ondansetron (ZOFRAN) 8 mg/50 mL NS IVPB  8 mg Intravenous Q6H PRN Roberto Guillaume MD   8 mg at 12/20/24 0540    ondansetron ODT (ZOFRAN-ODT) disintegrating tablet 8 mg  8 mg Translingual Q8H PRN Roberto Guillaume MD        propranolol LA (INDERAL LA) 24 hr capsule 120 mg  120 mg Oral Nightly Roberto Guillaume MD   120 mg at 12/19/24 2025    rOPINIRole (REQUIP) tablet 1 mg  1 mg  Oral Nightly Roberto Guillaume MD   1 mg at 12/19/24 2025    rosuvastatin (CRESTOR) tablet 10 mg  10 mg Oral Nightly Roberto Guillaume MD   10 mg at 12/19/24 2025    sodium chloride 0.9 % flush 10 mL  10 mL Intravenous PRN Roberto Guillaume MD        sodium chloride 0.9 % flush 10 mL  10 mL Intravenous Q12H Roberto Guillaume MD   10 mL at 12/19/24 2112    sodium chloride 0.9 % flush 10 mL  10 mL Intravenous PRN Roberto Guillaume MD        sodium chloride 0.9 % infusion 40 mL  40 mL Intravenous PRN Roberto Guillaume MD        tiZANidine (ZANAFLEX) tablet 4 mg  4 mg Oral Nightly PRN Roberto Guillaume MD        topiramate (TOPAMAX) tablet 100 mg  100 mg Oral Daily Roberto Guillaume MD   100 mg at 12/19/24 2025    zolpidem (AMBIEN) tablet 5 mg  5 mg Oral Nightly PRN Roberto Guillaume MD            Physician Progress Notes (last 24 hours)        Roberto Guillaume MD at 12/20/24 0614            Daily Progress Note  Gloria Melgoza  MRN: [id number] LOS: 2    Admit Date: 12/18/2024 12/20/2024 06:14 CST    Subjective:      Chief Complaint:  Chief Complaint   Patient presents with    Abdominal Pain    Nausea    Fever       Interval History:    Reviewed overnight events and nursing notes.   Still with LLQ pain but improving  Desires to Advance diet    Review of Systems   Constitutional:  Positive for activity change and appetite change.   HENT: Negative.     Respiratory: Negative.     Cardiovascular: Negative.    Gastrointestinal:  Positive for abdominal pain.   Genitourinary: Negative.    Musculoskeletal: Negative.    Neurological:  Positive for weakness.   Hematological: Negative.    Psychiatric/Behavioral: Negative.         DIET:  Diet: Liquid; Clear Liquid; Fluid Consistency: Thin (IDDSI 0)    Medications:        azelastine, 2 spray, Each Nare, BID  enoxaparin, 40 mg, Subcutaneous, Daily  fluticasone, 1 spray, Nasal, Daily  piperacillin-tazobactam, 3.375 g, Intravenous, Q8H  propranolol LA, 120 mg,  Oral, Nightly  rOPINIRole, 1 mg, Oral, Nightly  rosuvastatin, 10 mg, Oral, Nightly  sodium chloride, 10 mL, Intravenous, Q12H  topiramate, 100 mg, Oral, Daily        Data:     Code Status:   Code Status and Medical Interventions: CPR (Attempt to Resuscitate); Full Support   Ordered at: 12/18/24 1432     Level Of Support Discussed With:    Patient     Code Status (Patient has no pulse and is not breathing):    CPR (Attempt to Resuscitate)     Medical Interventions (Patient has pulse or is breathing):    Full Support       Family History   Problem Relation Age of Onset    Seizures Mother     Migraines Mother     Cancer Mother         Pre cancerous lesions in the colon    Hypertension Father     Diabetes Father     Breast cancer Paternal Grandmother     Cancer Paternal Grandmother         Breast and Colon Cancer    Cancer Maternal Grandmother         Bladder    Cancer Paternal Grandfather         Lung    Cancer Paternal Uncle         Bladder     Social History     Socioeconomic History    Marital status:    Tobacco Use    Smoking status: Former     Current packs/day: 0.50     Average packs/day: 0.5 packs/day for 20.0 years (10.0 ttl pk-yrs)     Types: Cigarettes    Smokeless tobacco: Never    Tobacco comments:     Doesn’t smoke cigarettes but have started using vape   Vaping Use    Vaping status: Every Day    Substances: Nicotine, Flavoring   Substance and Sexual Activity    Alcohol use: Yes     Alcohol/week: 2.0 standard drinks of alcohol     Types: 2 Glasses of wine per week    Drug use: Never    Sexual activity: Defer       Labs:    Lab Results (last 72 hours)       Procedure Component Value Units Date/Time    Basic Metabolic Panel [[id number]]  (Abnormal) Collected: 12/19/24 0350    Specimen: Blood Updated: 12/19/24 0446     Glucose 94 mg/dL      BUN 6 mg/dL      Creatinine 0.72 mg/dL      Sodium 138 mmol/L      Potassium 3.8 mmol/L      Chloride 110 mmol/L      CO2 20.0 mmol/L      Calcium 8.1 mg/dL       BUN/Creatinine Ratio 8.3     Anion Gap 8.0 mmol/L      eGFR 105.9 mL/min/1.73     Narrative:      GFR Categories in Chronic Kidney Disease (CKD)      GFR Category          GFR (mL/min/1.73)    Interpretation  G1                     90 or greater         Normal or high (1)  G2                      60-89                Mild decrease (1)  G3a                   45-59                Mild to moderate decrease  G3b                   30-44                Moderate to severe decrease  G4                    15-29                Severe decrease  G5                    14 or less           Kidney failure          (1)In the absence of evidence of kidney disease, neither GFR category G1 or G2 fulfill the criteria for CKD.    eGFR calculation 2021 CKD-EPI creatinine equation, which does not include race as a factor    CBC & Differential [[id number]]  (Normal) Collected: 12/19/24 0350    Specimen: Blood Updated: 12/19/24 0422    Narrative:      The following orders were created for panel order CBC & Differential.  Procedure                               Abnormality         Status                     ---------                               -----------         ------                     CBC Auto Differential[[id number]]        Normal              Final result                 Please view results for these tests on the individual orders.    CBC Auto Differential [[id number]]  (Normal) Collected: 12/19/24 0350    Specimen: Blood Updated: 12/19/24 0422     WBC 6.11 10*3/mm3      RBC 4.17 10*6/mm3      Hemoglobin 12.2 g/dL      Hematocrit 38.7 %      MCV 92.8 fL      MCH 29.3 pg      MCHC 31.5 g/dL      RDW 12.6 %      RDW-SD 42.6 fl      MPV 11.1 fL      Platelets 212 10*3/mm3      Neutrophil % 59.0 %      Lymphocyte % 26.0 %      Monocyte % 11.5 %      Eosinophil % 2.5 %      Basophil % 0.5 %      Immature Grans % 0.5 %      Neutrophils, Absolute 3.61 10*3/mm3      Lymphocytes, Absolute 1.59 10*3/mm3      Monocytes, Absolute 0.70 10*3/mm3       Eosinophils, Absolute 0.15 10*3/mm3      Basophils, Absolute 0.03 10*3/mm3      Immature Grans, Absolute 0.03 10*3/mm3      nRBC 0.0 /100 WBC     Blood Culture - Blood, Arm, Right [[id number]] Collected: 12/18/24 0946    Specimen: Blood from Arm, Right Updated: 12/18/24 1028    Blood Culture - Blood, Arm, Left [[id number]] Collected: 12/18/24 0955    Specimen: Blood from Arm, Left Updated: 12/18/24 1028    Comprehensive Metabolic Panel [[id number]]  (Abnormal) Collected: 12/18/24 0800    Specimen: Blood Updated: 12/18/24 0845     Glucose 98 mg/dL      BUN 8 mg/dL      Creatinine 0.69 mg/dL      Sodium 139 mmol/L      Potassium 3.7 mmol/L      Chloride 110 mmol/L      CO2 17.0 mmol/L      Calcium 8.5 mg/dL      Total Protein 6.5 g/dL      Albumin 3.8 g/dL      ALT (SGPT) 12 U/L      AST (SGOT) 12 U/L      Alkaline Phosphatase 96 U/L      Total Bilirubin 0.7 mg/dL      Globulin 2.7 gm/dL      A/G Ratio 1.4 g/dL      BUN/Creatinine Ratio 11.6     Anion Gap 12.0 mmol/L      eGFR 109.9 mL/min/1.73     Narrative:      GFR Categories in Chronic Kidney Disease (CKD)      GFR Category          GFR (mL/min/1.73)    Interpretation  G1                     90 or greater         Normal or high (1)  G2                      60-89                Mild decrease (1)  G3a                   45-59                Mild to moderate decrease  G3b                   30-44                Moderate to severe decrease  G4                    15-29                Severe decrease  G5                    14 or less           Kidney failure          (1)In the absence of evidence of kidney disease, neither GFR category G1 or G2 fulfill the criteria for CKD.    eGFR calculation 2021 CKD-EPI creatinine equation, which does not include race as a factor    Magnesium [[id number]]  (Normal) Collected: 12/18/24 0800    Specimen: Blood Updated: 12/18/24 0841     Magnesium 1.9 mg/dL     hCG, Serum, Qualitative [[id number]]  (Normal) Collected: 12/18/24 0800     Specimen: Blood Updated: 12/18/24 0838     HCG Qualitative Negative    Protime-INR [[id number]]  (Normal) Collected: 12/18/24 0800    Specimen: Blood Updated: 12/18/24 0834     Protime 14.3 Seconds      INR 1.06    CBC & Differential [[id number]]  (Abnormal) Collected: 12/18/24 0800    Specimen: Blood Updated: 12/18/24 0828    Narrative:      The following orders were created for panel order CBC & Differential.  Procedure                               Abnormality         Status                     ---------                               -----------         ------                     CBC Auto Differential[[id number]]        Abnormal            Final result                 Please view results for these tests on the individual orders.    CBC Auto Differential [[id number]]  (Abnormal) Collected: 12/18/24 0800    Specimen: Blood Updated: 12/18/24 0828     WBC 11.27 10*3/mm3      RBC 4.33 10*6/mm3      Hemoglobin 12.9 g/dL      Hematocrit 39.1 %      MCV 90.3 fL      MCH 29.8 pg      MCHC 33.0 g/dL      RDW 12.6 %      RDW-SD 41.5 fl      MPV 11.0 fL      Platelets 233 10*3/mm3      Neutrophil % 76.4 %      Lymphocyte % 12.0 %      Monocyte % 9.8 %      Eosinophil % 1.0 %      Basophil % 0.4 %      Immature Grans % 0.4 %      Neutrophils, Absolute 8.62 10*3/mm3      Lymphocytes, Absolute 1.35 10*3/mm3      Monocytes, Absolute 1.11 10*3/mm3      Eosinophils, Absolute 0.11 10*3/mm3      Basophils, Absolute 0.04 10*3/mm3      Immature Grans, Absolute 0.04 10*3/mm3      nRBC 0.0 /100 WBC     Urinalysis With Culture If Indicated - Urine, Clean Catch [[id number]]  (Normal) Collected: 12/18/24 0804    Specimen: Urine, Clean Catch Updated: 12/18/24 0826     Color, UA Yellow     Appearance, UA Clear     pH, UA 6.5     Specific Gravity, UA 1.016     Glucose, UA Negative     Ketones, UA Negative     Bilirubin, UA Negative     Blood, UA Negative     Protein, UA Negative     Leuk Esterase, UA Negative     Nitrite, UA Negative      "Urobilinogen, UA 1.0 E.U./dL    Narrative:      In absence of clinical symptoms, the presence of pyuria, bacteria, and/or nitrites on the urinalysis result does not correlate with infection.  Urine microscopic not indicated.              Objective:     Vitals: /74 (BP Location: Right arm, Patient Position: Lying)   Pulse 66   Temp 98.2 °F (36.8 °C) (Oral)   Resp 16   Ht 157.5 cm (62\")   Wt 69 kg (152 lb 3.2 oz)   SpO2 96%   BMI 27.84 kg/m²  No intake or output data in the 24 hours ending 24 0614 Temp (24hrs), Av.8 °F (36.6 °C), Min:97.6 °F (36.4 °C), Max:98.2 °F (36.8 °C)      Physical Exam  Vitals and nursing note reviewed.   Constitutional:       Appearance: Normal appearance.   HENT:      Head: Normocephalic and atraumatic.      Nose: Nose normal.      Mouth/Throat:      Mouth: Mucous membranes are moist.   Eyes:      Pupils: Pupils are equal, round, and reactive to light.   Cardiovascular:      Rate and Rhythm: Normal rate and regular rhythm.      Pulses: Normal pulses.      Heart sounds: Normal heart sounds.   Pulmonary:      Effort: Pulmonary effort is normal.      Breath sounds: Normal breath sounds.   Abdominal:      General: Abdomen is flat. Bowel sounds are normal.      Tenderness: There is abdominal tenderness.   Musculoskeletal:         General: Normal range of motion.   Skin:     General: Skin is warm.      Capillary Refill: Capillary refill takes less than 2 seconds.   Neurological:      General: No focal deficit present.      Mental Status: She is alert.             Assessment and Plan:     Primary Problem:  Diverticulitis  Leukocytosis     Hospital Problem list:    Diverticulitis      PMH:  Past Medical History:   Diagnosis Date    Asthma     Increasing shortness of breath in revent months    Dental disease     Almost all top teeth are crowns    Dizziness     Always attributed to blood pressure    GERD (gastroesophageal reflux disease)     Headache     Hyperlipidemia     " Insomnia     Migraine     Muscle spasm of right shoulder     Nosebleed     Sinusitis     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       Treatment Plan:  IV antibiotics to cover diverticulitis  IV fluid resuscitation  IV analgesia  IV antiemetics  Follow clinical course   ADAT    Disposition: home    Reviewed treatment plans with the patient and/or family.   30 minutes spent in face to face interaction and coordination of care.     Electronically signed by Roberto Guillaume MD on 12/20/2024 at 06:14 CST    Electronically signed by Roberto Guillaume MD at 12/20/24 0614       Pain meds given x2, zofran given x1. IV antibiotics given. Safety maintained. No falls or injuries this shift.     12/20  dilaudid iv x2   zofran iv x1

## 2024-12-21 PROCEDURE — 25010000002 ENOXAPARIN PER 10 MG: Performed by: FAMILY MEDICINE

## 2024-12-21 PROCEDURE — 25010000002 METRONIDAZOLE 500 MG/100ML SOLUTION: Performed by: FAMILY MEDICINE

## 2024-12-21 PROCEDURE — 25010000002 CEFTRIAXONE PER 250 MG: Performed by: FAMILY MEDICINE

## 2024-12-21 PROCEDURE — 25010000002 HYDROMORPHONE PER 4 MG: Performed by: FAMILY MEDICINE

## 2024-12-21 RX ORDER — METRONIDAZOLE 500 MG/1
500 TABLET ORAL EVERY 8 HOURS SCHEDULED
Status: COMPLETED | OUTPATIENT
Start: 2024-12-21 | End: 2024-12-22

## 2024-12-21 RX ADMIN — Medication 10 ML: at 22:14

## 2024-12-21 RX ADMIN — DOCUSATE SODIUM 50 MG AND SENNOSIDES 8.6 MG 2 TABLET: 8.6; 5 TABLET, FILM COATED ORAL at 13:48

## 2024-12-21 RX ADMIN — HYDROMORPHONE HYDROCHLORIDE 0.5 MG: 1 INJECTION, SOLUTION INTRAMUSCULAR; INTRAVENOUS; SUBCUTANEOUS at 10:06

## 2024-12-21 RX ADMIN — HYDROMORPHONE HYDROCHLORIDE 0.5 MG: 1 INJECTION, SOLUTION INTRAMUSCULAR; INTRAVENOUS; SUBCUTANEOUS at 05:45

## 2024-12-21 RX ADMIN — METRONIDAZOLE 500 MG: 500 INJECTION, SOLUTION INTRAVENOUS at 01:46

## 2024-12-21 RX ADMIN — CEFTRIAXONE SODIUM 2000 MG: 2 INJECTION, POWDER, FOR SOLUTION INTRAMUSCULAR; INTRAVENOUS at 09:45

## 2024-12-21 RX ADMIN — ZOLPIDEM TARTRATE 5 MG: 5 TABLET, FILM COATED ORAL at 22:12

## 2024-12-21 RX ADMIN — METRONIDAZOLE 500 MG: 500 INJECTION, SOLUTION INTRAVENOUS at 09:45

## 2024-12-21 RX ADMIN — ENOXAPARIN SODIUM 40 MG: 100 INJECTION SUBCUTANEOUS at 16:37

## 2024-12-21 RX ADMIN — HYDROMORPHONE HYDROCHLORIDE 0.5 MG: 1 INJECTION, SOLUTION INTRAMUSCULAR; INTRAVENOUS; SUBCUTANEOUS at 23:44

## 2024-12-21 RX ADMIN — Medication 10 ML: at 09:46

## 2024-12-21 RX ADMIN — ROSUVASTATIN 10 MG: 10 TABLET, FILM COATED ORAL at 22:12

## 2024-12-21 RX ADMIN — TOPIRAMATE 100 MG: 100 TABLET, FILM COATED ORAL at 09:46

## 2024-12-21 RX ADMIN — METRONIDAZOLE 500 MG: 500 TABLET ORAL at 22:12

## 2024-12-21 RX ADMIN — HYDROMORPHONE HYDROCHLORIDE 0.5 MG: 1 INJECTION, SOLUTION INTRAMUSCULAR; INTRAVENOUS; SUBCUTANEOUS at 13:48

## 2024-12-21 RX ADMIN — AZELASTINE HYDROCHLORIDE 2 SPRAY: 137 SPRAY, METERED NASAL at 22:14

## 2024-12-21 RX ADMIN — ROPINIROLE HYDROCHLORIDE 1 MG: 1 TABLET, FILM COATED ORAL at 22:12

## 2024-12-21 RX ADMIN — METRONIDAZOLE 500 MG: 500 TABLET ORAL at 16:39

## 2024-12-21 RX ADMIN — PROPRANOLOL HYDROCHLORIDE 120 MG: 60 CAPSULE, EXTENDED RELEASE ORAL at 22:12

## 2024-12-21 RX ADMIN — FAMOTIDINE 20 MG: 20 TABLET, FILM COATED ORAL at 22:12

## 2024-12-21 RX ADMIN — HYDROMORPHONE HYDROCHLORIDE 0.5 MG: 1 INJECTION, SOLUTION INTRAMUSCULAR; INTRAVENOUS; SUBCUTANEOUS at 01:46

## 2024-12-21 RX ADMIN — HYDROMORPHONE HYDROCHLORIDE 0.5 MG: 1 INJECTION, SOLUTION INTRAMUSCULAR; INTRAVENOUS; SUBCUTANEOUS at 17:49

## 2024-12-21 NOTE — PLAN OF CARE
Problem: Adult Inpatient Plan of Care  Goal: Plan of Care Review  Outcome: Progressing  Flowsheets (Taken 12/21/2024 7156)  Progress: no change  Outcome Evaluation: Patient rested well. Complains of pain x2, see MAR. No complaints of nausea. PRN pepcid ordered, improvement in acid reflux. IV abx infusing per order otherwise IID. Voiding. VSS. Safety maintained.  Plan of Care Reviewed With: patient

## 2024-12-21 NOTE — PLAN OF CARE
Goal Outcome Evaluation:              Outcome Evaluation: A&O. Up ad lindsay. Room air. Voiding. C/o pain, see MAR. IID. IV abx. PO abx. Lovenox. Safety maintained.

## 2024-12-21 NOTE — PROGRESS NOTES
Automatic IV to PO Pharmacy Note - Antimicrobial    Gloria Melgoza is a 44 y.o. female who meets the following criteria for IV to PO therapy conversion :     Tolerating oral fluids or 40ml/hour of enteral nutrition and oral route not otherwise compromised  Receiving other oral medications on a scheduled basis  Afebrile (temperature less than 100.4 degrees F) for at least 24 hours  WBC within/decreasing toward normal range    Lab Results   Component Value Date    WBC 6.41 12/20/2024     Temp Readings from Last 1 Encounters:   12/21/24 98.3 °F (36.8 °C)       Assessment/Plan  Based on this criteria, Metronidazole 500 mg IV Q8H has been changed to Metronidazole 500 mg PO Q8H per the directives and guidelines established by [facility] and Therapeutics Committee and [facility] Medical Executive Committee .       Joseluis Gibbs, PharmD  12/21/2415:59 CST

## 2024-12-21 NOTE — PROGRESS NOTES
Family Medicine Progress Note    Patient:  Gloria Melgoza  YOB: 1980    MRN: [id number]     Acct: [id number]     Admit date: 12/18/2024    Patient Seen, Chart, Consults notes, Labs, Radiology studies reviewed.    Subjective: Day 3 of stay with diverticulitis and most recent (in last 24 hours) has had some return of lower quadrant abdominal pain she had upon admission.  Has not had a bowel movement since admit.  States it is not severe but is certainly worse than it was yesterday.    Past, Family, Social History unchanged from admission.    Diet: Diet: Liquid; Full Liquid; Fluid Consistency: Thin (IDDSI 0)    Medications:  Scheduled Meds:azelastine, 2 spray, Each Nare, BID  cefTRIAXone, 2,000 mg, Intravenous, Q24H  enoxaparin, 40 mg, Subcutaneous, Daily  fluticasone, 1 spray, Nasal, Daily  metroNIDAZOLE, 500 mg, Intravenous, Q8H  propranolol LA, 120 mg, Oral, Nightly  rOPINIRole, 1 mg, Oral, Nightly  rosuvastatin, 10 mg, Oral, Nightly  sodium chloride, 10 mL, Intravenous, Q12H  topiramate, 100 mg, Oral, Daily      Continuous Infusions:   PRN Meds:  acetaminophen    senna-docusate sodium **AND** polyethylene glycol **AND** bisacodyl **AND** bisacodyl    famotidine    HYDROmorphone    ondansetron    ondansetron ODT    [COMPLETED] Insert Peripheral IV **AND** sodium chloride    sodium chloride    sodium chloride    tiZANidine    zolpidem    Objective:    Lab Results (last 24 hours)       Procedure Component Value Units Date/Time    Blood Culture - Blood, Arm, Right [[id number]]  (Normal) Collected: 12/18/24 0946    Specimen: Blood from Arm, Right Updated: 12/20/24 1030     Blood Culture No growth at 2 days    Blood Culture - Blood, Arm, Left [[id number]]  (Normal) Collected: 12/18/24 0955    Specimen: Blood from Arm, Left Updated: 12/20/24 1030     Blood Culture No growth at 2 days             Imaging Results (Last 72 Hours)       ** No results found for the last 72 hours. **             Physical  "Exam:    Vitals: /71 (BP Location: Left arm, Patient Position: Lying)   Pulse 59   Temp 98.3 °F (36.8 °C)   Resp 16   Ht 157.5 cm (62\")   Wt 67.9 kg (149 lb 9.6 oz)   SpO2 98%   BMI 27.36 kg/m²   24 hour intake/output:  Intake/Output Summary (Last 24 hours) at 12/21/2024 1012  Last data filed at 12/21/2024 0530  Gross per 24 hour   Intake 700 ml   Output --   Net 700 ml     Last 3 weights:  Wt Readings from Last 3 Encounters:   12/21/24 67.9 kg (149 lb 9.6 oz)   11/20/24 69.4 kg (153 lb)   11/13/24 69.4 kg (153 lb)       General Appearance alert, appears stated age, and cooperative  Head normocephalic, without obvious abnormality and atraumatic  Lungs clear to auscultation, respirations regular, respirations even, and respirations unlabored  Heart regular rhythm & normal rate and normal S1, S2  Abdomen normal bowel sounds and no masses, tender  RLQ  Extremities no edema, no cyanosis, and no redness  Skin turgor normal and color normal  Neurologic Mental Status orientated to person, place, time and situation        Assessment:      Diverticulitis          Plan:  Continuing antibiotics.  Full liquid diet.  Has had no real bowel movement since admission.  Will make sure she has bowel regimen in place.  Hopeful that today he is just little continuation due to inflammation and possibly if improved consider discharge home tomorrow.      Electronically signed by Eladio Villanueva MD on 12/21/2024 at 10:12 CST            "

## 2024-12-22 ENCOUNTER — APPOINTMENT (OUTPATIENT)
Dept: CT IMAGING | Facility: HOSPITAL | Age: 44
End: 2024-12-22
Payer: COMMERCIAL

## 2024-12-22 PROCEDURE — 25010000002 ONDANSETRON PER 1 MG: Performed by: FAMILY MEDICINE

## 2024-12-22 PROCEDURE — 25010000002 HYDROMORPHONE PER 4 MG: Performed by: FAMILY MEDICINE

## 2024-12-22 PROCEDURE — 25510000001 IOPAMIDOL 61 % SOLUTION: Performed by: FAMILY MEDICINE

## 2024-12-22 PROCEDURE — 25010000002 CEFTRIAXONE PER 250 MG: Performed by: FAMILY MEDICINE

## 2024-12-22 PROCEDURE — 74177 CT ABD & PELVIS W/CONTRAST: CPT

## 2024-12-22 PROCEDURE — 25010000002 ENOXAPARIN PER 10 MG: Performed by: FAMILY MEDICINE

## 2024-12-22 RX ORDER — ONDANSETRON 2 MG/ML
4 INJECTION INTRAMUSCULAR; INTRAVENOUS EVERY 4 HOURS PRN
Status: DISCONTINUED | OUTPATIENT
Start: 2024-12-22 | End: 2024-12-23 | Stop reason: HOSPADM

## 2024-12-22 RX ORDER — IOPAMIDOL 612 MG/ML
50 INJECTION, SOLUTION INTRAVASCULAR
Status: COMPLETED | OUTPATIENT
Start: 2024-12-22 | End: 2024-12-22

## 2024-12-22 RX ORDER — IOPAMIDOL 612 MG/ML
100 INJECTION, SOLUTION INTRAVASCULAR
Status: COMPLETED | OUTPATIENT
Start: 2024-12-22 | End: 2024-12-22

## 2024-12-22 RX ADMIN — Medication 10 ML: at 20:05

## 2024-12-22 RX ADMIN — CEFTRIAXONE SODIUM 2000 MG: 2 INJECTION, POWDER, FOR SOLUTION INTRAMUSCULAR; INTRAVENOUS at 08:01

## 2024-12-22 RX ADMIN — HYDROMORPHONE HYDROCHLORIDE 0.5 MG: 1 INJECTION, SOLUTION INTRAMUSCULAR; INTRAVENOUS; SUBCUTANEOUS at 03:46

## 2024-12-22 RX ADMIN — HYDROMORPHONE HYDROCHLORIDE 0.5 MG: 1 INJECTION, SOLUTION INTRAMUSCULAR; INTRAVENOUS; SUBCUTANEOUS at 07:59

## 2024-12-22 RX ADMIN — PROPRANOLOL HYDROCHLORIDE 120 MG: 60 CAPSULE, EXTENDED RELEASE ORAL at 20:04

## 2024-12-22 RX ADMIN — ZOLPIDEM TARTRATE 5 MG: 5 TABLET, FILM COATED ORAL at 20:05

## 2024-12-22 RX ADMIN — IOPAMIDOL 100 ML: 612 INJECTION, SOLUTION INTRAVENOUS at 14:06

## 2024-12-22 RX ADMIN — METRONIDAZOLE 500 MG: 500 TABLET ORAL at 05:55

## 2024-12-22 RX ADMIN — ROSUVASTATIN 10 MG: 10 TABLET, FILM COATED ORAL at 20:05

## 2024-12-22 RX ADMIN — AZELASTINE HYDROCHLORIDE 2 SPRAY: 137 SPRAY, METERED NASAL at 08:07

## 2024-12-22 RX ADMIN — ONDANSETRON HYDROCHLORIDE 8 MG: 2 INJECTION INTRAMUSCULAR; INTRAVENOUS at 09:37

## 2024-12-22 RX ADMIN — METRONIDAZOLE 500 MG: 500 TABLET ORAL at 13:29

## 2024-12-22 RX ADMIN — Medication 10 ML: at 08:07

## 2024-12-22 RX ADMIN — FLUTICASONE PROPIONATE 1 SPRAY: 50 SPRAY, METERED NASAL at 08:07

## 2024-12-22 RX ADMIN — HYDROMORPHONE HYDROCHLORIDE 0.5 MG: 1 INJECTION, SOLUTION INTRAMUSCULAR; INTRAVENOUS; SUBCUTANEOUS at 12:02

## 2024-12-22 RX ADMIN — ROPINIROLE HYDROCHLORIDE 1 MG: 1 TABLET, FILM COATED ORAL at 20:04

## 2024-12-22 RX ADMIN — AZELASTINE HYDROCHLORIDE 2 SPRAY: 137 SPRAY, METERED NASAL at 20:05

## 2024-12-22 RX ADMIN — HYDROMORPHONE HYDROCHLORIDE 0.5 MG: 1 INJECTION, SOLUTION INTRAMUSCULAR; INTRAVENOUS; SUBCUTANEOUS at 16:00

## 2024-12-22 RX ADMIN — TOPIRAMATE 100 MG: 100 TABLET, FILM COATED ORAL at 08:07

## 2024-12-22 RX ADMIN — FAMOTIDINE 20 MG: 20 TABLET, FILM COATED ORAL at 13:29

## 2024-12-22 RX ADMIN — ENOXAPARIN SODIUM 40 MG: 100 INJECTION SUBCUTANEOUS at 16:00

## 2024-12-22 RX ADMIN — IOPAMIDOL 50 ML: 612 INJECTION, SOLUTION INTRAVENOUS at 14:06

## 2024-12-22 RX ADMIN — HYDROMORPHONE HYDROCHLORIDE 0.5 MG: 1 INJECTION, SOLUTION INTRAMUSCULAR; INTRAVENOUS; SUBCUTANEOUS at 20:05

## 2024-12-22 NOTE — PLAN OF CARE
Goal Outcome Evaluation:  Plan of Care Reviewed With: patient           Outcome Evaluation: Pt states that pain has been increasing over the night and morning. CT ordered. Pain medicine given prn. Scheduled IV and PO abx. BM x2; pt states that they are liquid. Full liquid diet.

## 2024-12-22 NOTE — PLAN OF CARE
Problem: Adult Inpatient Plan of Care  Goal: Plan of Care Review  Outcome: Progressing  Flowsheets (Taken 12/22/2024 6858)  Progress: no change  Outcome Evaluation: Patient rested well. Complains of pain, see MAR. No complaints of nausea. IV abx infusing per order otherwise IID. PO abx. Up ad lindsay, voiding. A&Ox4. VSS. Safety maintained.  Plan of Care Reviewed With: patient

## 2024-12-22 NOTE — PROGRESS NOTES
Family Medicine Progress Note    Patient:  Gloria Melgoza  YOB: 1980    MRN: [id number]     Acct: [id number]     Admit date: 12/18/2024    Patient Seen, Chart, Consults notes, Labs, Radiology studies reviewed.    Subjective: Day 4 of stay with diverticulitis and most recent (in last 24 hours) has had worsening of her pain from yesterday.  Not quite to the level of was on admission but certainly worse than it was after her initial 24 hours of antibiotics.    Past, Family, Social History unchanged from admission.    Diet: Diet: Liquid; Full Liquid; Fluid Consistency: Thin (IDDSI 0)    Medications:  Scheduled Meds:azelastine, 2 spray, Each Nare, BID  enoxaparin, 40 mg, Subcutaneous, Daily  fluticasone, 1 spray, Nasal, Daily  metroNIDAZOLE, 500 mg, Oral, Q8H  propranolol LA, 120 mg, Oral, Nightly  rOPINIRole, 1 mg, Oral, Nightly  rosuvastatin, 10 mg, Oral, Nightly  sodium chloride, 10 mL, Intravenous, Q12H  topiramate, 100 mg, Oral, Daily      Continuous Infusions:   PRN Meds:  acetaminophen    senna-docusate sodium **AND** polyethylene glycol **AND** bisacodyl **AND** bisacodyl    famotidine    HYDROmorphone    ondansetron    ondansetron ODT    [COMPLETED] Insert Peripheral IV **AND** sodium chloride    sodium chloride    sodium chloride    tiZANidine    zolpidem    Objective:    Lab Results (last 24 hours)       Procedure Component Value Units Date/Time    Blood Culture - Blood, Arm, Left [[id number]]  (Normal) Collected: 12/18/24 0955    Specimen: Blood from Arm, Left Updated: 12/21/24 1030     Blood Culture No growth at 3 days    Blood Culture - Blood, Arm, Right [[id number]]  (Normal) Collected: 12/18/24 0946    Specimen: Blood from Arm, Right Updated: 12/21/24 1030     Blood Culture No growth at 3 days             Imaging Results (Last 72 Hours)       ** No results found for the last 72 hours. **             Physical Exam:    Vitals: /90 (BP Location: Right arm, Patient Position: Lying)   " Pulse 66   Temp 97.9 °F (36.6 °C) (Oral)   Resp 16   Ht 157.5 cm (62\")   Wt 67.1 kg (148 lb)   SpO2 98%   BMI 27.07 kg/m²   24 hour intake/output:  Intake/Output Summary (Last 24 hours) at 12/22/2024 1007  Last data filed at 12/22/2024 0334  Gross per 24 hour   Intake 664 ml   Output --   Net 664 ml     Last 3 weights:  Wt Readings from Last 3 Encounters:   12/22/24 67.1 kg (148 lb)   11/20/24 69.4 kg (153 lb)   11/13/24 69.4 kg (153 lb)       General Appearance alert, appears stated age, and cooperative  Head normocephalic, without obvious abnormality and atraumatic  Lungs clear to auscultation, respirations regular, respirations even, and respirations unlabored  Heart regular rhythm & normal rate and normal S1, S2  Abdomen normal bowel sounds and no masses, tender  RLQ  Extremities no edema, no cyanosis, and no redness  Skin turgor normal and color normal  Neurologic Mental Status orientated to person, place, time and situation        Assessment:      Diverticulitis          Plan:  Can explain her worsening symptoms.  I will order repeat CT scan to rule out that she has any developing abscess or perforation given her initial response to treatment but now gradual worsening of symptoms.      Electronically signed by Eladio Villanueva MD on 12/22/2024 at 10:07 CST            "

## 2024-12-23 VITALS
RESPIRATION RATE: 16 BRPM | SYSTOLIC BLOOD PRESSURE: 123 MMHG | OXYGEN SATURATION: 96 % | WEIGHT: 146.8 LBS | HEIGHT: 62 IN | BODY MASS INDEX: 27.02 KG/M2 | TEMPERATURE: 97.6 F | DIASTOLIC BLOOD PRESSURE: 76 MMHG | HEART RATE: 87 BPM

## 2024-12-23 LAB
ANION GAP SERPL CALCULATED.3IONS-SCNC: 14 MMOL/L (ref 5–15)
BACTERIA SPEC AEROBE CULT: NORMAL
BACTERIA SPEC AEROBE CULT: NORMAL
BASOPHILS # BLD AUTO: 0.03 10*3/MM3 (ref 0–0.2)
BASOPHILS NFR BLD AUTO: 0.6 % (ref 0–1.5)
BUN SERPL-MCNC: 7 MG/DL (ref 6–20)
BUN/CREAT SERPL: 9.9 (ref 7–25)
CALCIUM SPEC-SCNC: 9.2 MG/DL (ref 8.6–10.5)
CHLORIDE SERPL-SCNC: 107 MMOL/L (ref 98–107)
CO2 SERPL-SCNC: 19 MMOL/L (ref 22–29)
CREAT SERPL-MCNC: 0.71 MG/DL (ref 0.57–1)
DEPRECATED RDW RBC AUTO: 41.6 FL (ref 37–54)
EGFRCR SERPLBLD CKD-EPI 2021: 107.7 ML/MIN/1.73
EOSINOPHIL # BLD AUTO: 0.14 10*3/MM3 (ref 0–0.4)
EOSINOPHIL NFR BLD AUTO: 2.8 % (ref 0.3–6.2)
ERYTHROCYTE [DISTWIDTH] IN BLOOD BY AUTOMATED COUNT: 12.8 % (ref 12.3–15.4)
GLUCOSE SERPL-MCNC: 89 MG/DL (ref 65–99)
HCT VFR BLD AUTO: 45.3 % (ref 34–46.6)
HGB BLD-MCNC: 14.9 G/DL (ref 12–15.9)
IMM GRANULOCYTES # BLD AUTO: 0.02 10*3/MM3 (ref 0–0.05)
IMM GRANULOCYTES NFR BLD AUTO: 0.4 % (ref 0–0.5)
LYMPHOCYTES # BLD AUTO: 1.59 10*3/MM3 (ref 0.7–3.1)
LYMPHOCYTES NFR BLD AUTO: 31.4 % (ref 19.6–45.3)
MCH RBC QN AUTO: 29.6 PG (ref 26.6–33)
MCHC RBC AUTO-ENTMCNC: 32.9 G/DL (ref 31.5–35.7)
MCV RBC AUTO: 90.1 FL (ref 79–97)
MONOCYTES # BLD AUTO: 0.64 10*3/MM3 (ref 0.1–0.9)
MONOCYTES NFR BLD AUTO: 12.6 % (ref 5–12)
NEUTROPHILS NFR BLD AUTO: 2.64 10*3/MM3 (ref 1.7–7)
NEUTROPHILS NFR BLD AUTO: 52.2 % (ref 42.7–76)
NRBC BLD AUTO-RTO: 0 /100 WBC (ref 0–0.2)
PLATELET # BLD AUTO: 264 10*3/MM3 (ref 140–450)
PMV BLD AUTO: 10.8 FL (ref 6–12)
POTASSIUM SERPL-SCNC: 4 MMOL/L (ref 3.5–5.2)
RBC # BLD AUTO: 5.03 10*6/MM3 (ref 3.77–5.28)
SODIUM SERPL-SCNC: 140 MMOL/L (ref 136–145)
WBC NRBC COR # BLD AUTO: 5.06 10*3/MM3 (ref 3.4–10.8)

## 2024-12-23 PROCEDURE — 25010000002 HYDROMORPHONE PER 4 MG: Performed by: FAMILY MEDICINE

## 2024-12-23 PROCEDURE — 80048 BASIC METABOLIC PNL TOTAL CA: CPT | Performed by: FAMILY MEDICINE

## 2024-12-23 PROCEDURE — 85025 COMPLETE CBC W/AUTO DIFF WBC: CPT | Performed by: FAMILY MEDICINE

## 2024-12-23 RX ORDER — TOPIRAMATE 100 MG/1
100 TABLET, FILM COATED ORAL DAILY
Qty: 30 TABLET | Refills: 3 | Status: SHIPPED | OUTPATIENT
Start: 2024-12-23

## 2024-12-23 RX ORDER — CIPROFLOXACIN 500 MG/1
500 TABLET, FILM COATED ORAL 2 TIMES DAILY
Qty: 10 TABLET | Refills: 0 | Status: SHIPPED | OUTPATIENT
Start: 2024-12-23 | End: 2024-12-28

## 2024-12-23 RX ORDER — ROSUVASTATIN CALCIUM 10 MG/1
10 TABLET, COATED ORAL NIGHTLY
Qty: 90 TABLET | Refills: 3 | Status: SHIPPED | OUTPATIENT
Start: 2024-12-23

## 2024-12-23 RX ADMIN — AZELASTINE HYDROCHLORIDE 2 SPRAY: 137 SPRAY, METERED NASAL at 08:30

## 2024-12-23 RX ADMIN — TOPIRAMATE 100 MG: 100 TABLET, FILM COATED ORAL at 08:29

## 2024-12-23 RX ADMIN — HYDROMORPHONE HYDROCHLORIDE 0.5 MG: 1 INJECTION, SOLUTION INTRAMUSCULAR; INTRAVENOUS; SUBCUTANEOUS at 04:28

## 2024-12-23 RX ADMIN — HYDROMORPHONE HYDROCHLORIDE 0.5 MG: 1 INJECTION, SOLUTION INTRAMUSCULAR; INTRAVENOUS; SUBCUTANEOUS at 00:25

## 2024-12-23 RX ADMIN — FAMOTIDINE 20 MG: 20 TABLET, FILM COATED ORAL at 00:25

## 2024-12-23 RX ADMIN — FLUTICASONE PROPIONATE 1 SPRAY: 50 SPRAY, METERED NASAL at 08:30

## 2024-12-23 RX ADMIN — Medication 10 ML: at 08:29

## 2024-12-23 RX ADMIN — HYDROMORPHONE HYDROCHLORIDE 0.5 MG: 1 INJECTION, SOLUTION INTRAMUSCULAR; INTRAVENOUS; SUBCUTANEOUS at 08:29

## 2024-12-23 NOTE — PLAN OF CARE
Problem: Adult Inpatient Plan of Care  Goal: Plan of Care Review  Outcome: Progressing  Flowsheets (Taken 12/23/2024 4532)  Progress: no change  Outcome Evaluation: Patient rested well. Complains of lower left abdomen pain, see MAR. IV IID. Voiding. VSS. Safety maintained.  Plan of Care Reviewed With: patient

## 2024-12-23 NOTE — PAYOR COMM NOTE
"12/23/24 [facility] [phone]  [phone]    FAXING UPDATE CLINICAL FROM 12/21/24 THRU 12/23/24.      Gloria Kiran (44 y.o. Female)       Date of Birth   1980    Social Security Number       Address   [address] [place] [id number]    Home Phone   [phone]    MRN   [id number]       Religious   Rastafarian    Marital Status                               Admission Date   12/18/24    Admission Type   Emergency    Admitting Provider   Roberto Guillaume MD    Attending Provider   Roberto Guillaume MD    Department, Room/Bed   [facility] 3C, 376/1       Discharge Date       Discharge Disposition   Home or Self Care    Discharge Destination                                 Attending Provider: Roberto Guillaume MD    Allergies: Sulfa Antibiotics, Wound Dressing Adhesive    Isolation: None   Infection: None   Code Status: CPR    Ht: 157.5 cm (62\")   Wt: 66.6 kg (146 lb 12.8 oz)    Admission Cmt: None   Principal Problem: Diverticulitis [K57.92]                   Active Insurance as of 12/18/2024       Primary Coverage       Payor Plan Insurance Group Employer/Plan Group    [employer] EM[employer] [id number]       Payor Plan Address Payor Plan Phone Number Payor Plan Fax Number Effective Dates    [address] [phone]  8/1/2022 - None Entered    [place] [address]         Subscriber Name Subscriber Birth Date Member ID       GLORIA KIRAN 1980 [id number]                     Emergency Contacts        (Rel.) Home Phone Work Phone Mobile Phone    David Kiran (Spouse) [phone] -- [phone]    Crista Glasgow (Mother) [phone] -- --            devin Temp Pulse Resp BP Patient Position Device (Oxygen Therapy) SpO2   12/23/24 0752 97.6 (36.4) 87 16 123/76 Lying room air 96   12/23/24 0415 97.9 (36.6) 71 16 135/92 Sitting room air 96   12/22/24 1959 97.5 (36.4) 64 16 122/85 Lying room air 97 "   12/22/24 1548 98.4 (36.9) 71 16 126/84 Lying room air 97   12/22/24 1136 98.4 (36.9) 66 16 134/83 Lying room air 97   12/22/24 0747 97.9 (36.6) 66 16 139/90 Lying room air 98   12/22/24 0727 -- -- -- -- -- room air --   12/22/24 0334 97.7 (36.5) 68 16 141/85 Lying room air 97   12/21/24 2358 98.6 (37) 69 16 131/84 Lying room air 96   12/21/24 1927 97.9 (36.6) 65 16 122/85 Lying room air 97   12/21/24 1536 98.3 (36.8) 61 16 119/76 Lying room air 96   12/21/24 1149 97.7 (36.5) 60 16 117/78 Lying room air 98   12/21/24 0753 98.3 (36.8) 59 16 111/71 Lying room air 98   12/21/24 0748                Eladio Villanueva MD   Physician  Medicine     Progress Notes     Signed     Date of Service: 12/21/24 1012  Creation Time: 12/21/24 1012     Signed       Expand All Collapse All     Family Medicine Progress Note     Patient:  Gloria Melgoza  YOB: 1980     MRN: [id number]                                Acct: [id number]      Admit date: 12/18/2024     Patient Seen, Chart, Consults notes, Labs, Radiology studies reviewed.     Subjective: Day 3 of stay with diverticulitis and most recent (in last 24 hours) has had some return of lower quadrant abdominal pain she had upon admission.  Has not had a bowel movement since admit.  States it is not severe but is certainly worse than it was yesterday.     Past, Family, Social History unchanged from admission.     Diet: Diet: Liquid; Full Liquid; Fluid Consistency: Thin (IDDSI 0)     Medications:  Scheduled Meds:  Scheduled Medication   azelastine, 2 spray, Each Nare, BID  cefTRIAXone, 2,000 mg, Intravenous, Q24H  enoxaparin, 40 mg, Subcutaneous, Daily  fluticasone, 1 spray, Nasal, Daily  metroNIDAZOLE, 500 mg, Intravenous, Q8H  propranolol LA, 120 mg, Oral, Nightly  rOPINIRole, 1 mg, Oral, Nightly  rosuvastatin, 10 mg, Oral, Nightly  sodium chloride, 10 mL, Intravenous, Q12H  topiramate, 100 mg, Oral, Daily         Continuous Infusions:  Infusion Medications     "     PRN Meds:  PRN Medication     acetaminophen    senna-docusate sodium **AND** polyethylene glycol **AND** bisacodyl **AND** bisacodyl    famotidine    HYDROmorphone    ondansetron    ondansetron ODT    [COMPLETED] Insert Peripheral IV **AND** sodium chloride    sodium chloride    sodium chloride    tiZANidine    zolpidem        Objective:     Lab Results (last 24 hours)         Procedure Component Value Units Date/Time     Blood Culture - Blood, Arm, Right [[id number]]  (Normal) Collected: 12/18/24 0946     Specimen: Blood from Arm, Right Updated: 12/20/24 1030       Blood Culture No growth at 2 days     Blood Culture - Blood, Arm, Left [[id number]]  (Normal) Collected: 12/18/24 0955     Specimen: Blood from Arm, Left Updated: 12/20/24 1030       Blood Culture No growth at 2 days                Imaging Results (Last 72 Hours)         ** No results found for the last 72 hours. **                Physical Exam:     Vitals: /71 (BP Location: Left arm, Patient Position: Lying)   Pulse 59   Temp 98.3 °F (36.8 °C)   Resp 16   Ht 157.5 cm (62\")   Wt 67.9 kg (149 lb 9.6 oz)   SpO2 98%   BMI 27.36 kg/m²   24 hour intake/output:  Intake/Output Summary (Last 24 hours) at 12/21/2024 1012  Last data filed at 12/21/2024 0530      Gross per 24 hour   Intake 700 ml   Output --   Net 700 ml      Last 3 weights:      Wt Readings from Last 3 Encounters:   12/21/24 67.9 kg (149 lb 9.6 oz)   11/20/24 69.4 kg (153 lb)   11/13/24 69.4 kg (153 lb)         General Appearance alert, appears stated age, and cooperative  Head normocephalic, without obvious abnormality and atraumatic  Lungs clear to auscultation, respirations regular, respirations even, and respirations unlabored  Heart regular rhythm & normal rate and normal S1, S2  Abdomen normal bowel sounds and no masses, tender  RLQ  Extremities no edema, no cyanosis, and no redness  Skin turgor normal and color normal  Neurologic Mental Status orientated to person, place, " time and situation           Assessment:       Diverticulitis              Plan:  Continuing antibiotics.  Full liquid diet.  Has had no real bowel movement since admission.  Will make sure she has bowel regimen in place.  Hopeful that today he is just little continuation due to inflammation and possibly if improved consider discharge home tomorrow.        Electronically signed by Eladio Villanueva MD on 12/21/2024 at 10:12 CST                    Eladio Villanueva MD   Physician  Medicine     Progress Notes     Signed     Date of Service: 12/22/24 1007  Creation Time: 12/22/24 1007     Signed       Expand All Collapse All     Family Medicine Progress Note     Patient:  Gloria Melgoza  YOB: 1980     MRN: [id number]                                Acct: [id number]      Admit date: 12/18/2024     Patient Seen, Chart, Consults notes, Labs, Radiology studies reviewed.     Subjective: Day 4 of stay with diverticulitis and most recent (in last 24 hours) has had worsening of her pain from yesterday.  Not quite to the level of was on admission but certainly worse than it was after her initial 24 hours of antibiotics.     Past, Family, Social History unchanged from admission.     Diet: Diet: Liquid; Full Liquid; Fluid Consistency: Thin (IDDSI 0)     Medications:  Scheduled Meds:  Scheduled Medication   azelastine, 2 spray, Each Nare, BID  enoxaparin, 40 mg, Subcutaneous, Daily  fluticasone, 1 spray, Nasal, Daily  metroNIDAZOLE, 500 mg, Oral, Q8H  propranolol LA, 120 mg, Oral, Nightly  rOPINIRole, 1 mg, Oral, Nightly  rosuvastatin, 10 mg, Oral, Nightly  sodium chloride, 10 mL, Intravenous, Q12H  topiramate, 100 mg, Oral, Daily         Continuous Infusions:  Infusion Medications         PRN Meds:  PRN Medication     acetaminophen    senna-docusate sodium **AND** polyethylene glycol **AND** bisacodyl **AND** bisacodyl    famotidine    HYDROmorphone    ondansetron    ondansetron ODT    [COMPLETED]  "Insert Peripheral IV **AND** sodium chloride    sodium chloride    sodium chloride    tiZANidine    zolpidem        Objective:     Lab Results (last 24 hours)         Procedure Component Value Units Date/Time     Blood Culture - Blood, Arm, Left [[id number]]  (Normal) Collected: 12/18/24 0955     Specimen: Blood from Arm, Left Updated: 12/21/24 1030       Blood Culture No growth at 3 days     Blood Culture - Blood, Arm, Right [[id number]]  (Normal) Collected: 12/18/24 0946     Specimen: Blood from Arm, Right Updated: 12/21/24 1030       Blood Culture No growth at 3 days                Imaging Results (Last 72 Hours)         ** No results found for the last 72 hours. **                Physical Exam:     Vitals: /90 (BP Location: Right arm, Patient Position: Lying)   Pulse 66   Temp 97.9 °F (36.6 °C) (Oral)   Resp 16   Ht 157.5 cm (62\")   Wt 67.1 kg (148 lb)   SpO2 98%   BMI 27.07 kg/m²   24 hour intake/output:  Intake/Output Summary (Last 24 hours) at 12/22/2024 1007  Last data filed at 12/22/2024 0334      Gross per 24 hour   Intake 664 ml   Output --   Net 664 ml      Last 3 weights:      Wt Readings from Last 3 Encounters:   12/22/24 67.1 kg (148 lb)   11/20/24 69.4 kg (153 lb)   11/13/24 69.4 kg (153 lb)         General Appearance alert, appears stated age, and cooperative  Head normocephalic, without obvious abnormality and atraumatic  Lungs clear to auscultation, respirations regular, respirations even, and respirations unlabored  Heart regular rhythm & normal rate and normal S1, S2  Abdomen normal bowel sounds and no masses, tender  RLQ  Extremities no edema, no cyanosis, and no redness  Skin turgor normal and color normal  Neurologic Mental Status orientated to person, place, time and situation           Assessment:       Diverticulitis              Plan:  Can explain her worsening symptoms.  I will order repeat CT scan to rule out that she has any developing abscess or perforation given her " initial response to treatment but now gradual worsening of symptoms.        Electronically signed by Eladio Villanueva MD on 2024 at 10:07 CST                 Roberto Guillaume MD   Physician  Medicine     Discharge Summary     Signed     Date of Service: 24  Creation Time: 24     Signed                  Gloria Melgoza                 :  1980                   MRN:  [id number]     Admit date:  2024                          Admitting Physician:    Roberto Guillaume MD     Discharge Diagnoses:      Diverticulitis        Hospital Course:   The patient is a 44 y.o. female who presents with 2 to 3-day history of left lower quadrant abdominal pain fever chills and overall malaise.  Further workup in the emergency department reveals acute diverticulitis without evidence of perforation.  Given her degree of pain and discomfort she has been to for IV analgesia IV fluid resuscitation and IV antibiotics. She slowly recovered, repeat CT showed dramatic improvement in diverticulitis. DC on po Cipro and soft foods.     The patient was admitted for the above noted medical/surgical issues. Please see daily progress note for further details concerning their stay. The patient improved throughout their stay and reached maximum medical improvement on the day of discharge. The patient/family agree with the treatment plan as outlined above. All questions concerning their stay were answered prior to discharge. They understand the importance of follow up concerning any abnormal test results.      Physical Exam  Vitals and nursing note reviewed.   Constitutional:       Appearance: Normal appearance. She is normal weight.   HENT:      Head: Normocephalic and atraumatic.   Cardiovascular:      Rate and Rhythm: Normal rate and regular rhythm.      Pulses: Normal pulses.      Heart sounds: Normal heart sounds.   Pulmonary:      Effort: Pulmonary effort is normal.   Abdominal:      General:  Abdomen is flat. Bowel sounds are normal.      Palpations: Abdomen is soft.   Skin:     General: Skin is warm.      Capillary Refill: Capillary refill takes less than 2 seconds.   Neurological:      General: No focal deficit present.                         Hospital Discharge Summary     Gloria Melgoza                 :  1980                   MRN:  [id number]     Admit date:  2024                    Discharge date:  2024     Admitting Physician:    Roberto Guillaume MD     Discharge Diagnoses:      Diverticulitis        Hospital Course:   The patient is a 44 y.o. female who presents with 2 to 3-day history of left lower quadrant abdominal pain fever chills and overall malaise.  Further workup in the emergency department reveals acute diverticulitis without evidence of perforation.  Given her degree of pain and discomfort she has been to for IV analgesia IV fluid resuscitation and IV antibiotics. She slowly recovered, repeat CT showed dramatic improvement in diverticulitis. DC on po Cipro and soft foods.     The patient was admitted for the above noted medical/surgical issues. Please see daily progress note for further details concerning their stay. The patient improved throughout their stay and reached maximum medical improvement on the day of discharge. The patient/family agree with the treatment plan as outlined above. All questions concerning their stay were answered prior to discharge. They understand the importance of follow up concerning any abnormal test results.      Physical Exam  Vitals and nursing note reviewed.   Constitutional:       Appearance: Normal appearance. She is normal weight.   HENT:      Head: Normocephalic and atraumatic.   Cardiovascular:      Rate and Rhythm: Normal rate and regular rhythm.      Pulses: Normal pulses.      Heart sounds: Normal heart sounds.   Pulmonary:      Effort: Pulmonary effort is normal.   Abdominal:      General: Abdomen is flat. Bowel  sounds are normal.      Palpations: Abdomen is soft.   Skin:     General: Skin is warm.      Capillary Refill: Capillary refill takes less than 2 seconds.   Neurological:      General: No focal deficit present.               Discharge Medications:          Discharge Medications          New Medications         Instructions Start Date   ciprofloxacin 500 MG tablet  Commonly known as: Cipro    500 mg, Oral, 2 Times Daily                  Changes to Medications         Instructions Start Date   propranolol  MG 24 hr capsule  Commonly known as: INDERAL LA  What changed: when to take this    120 mg, Oral, Daily        rosuvastatin 10 MG tablet  Commonly known as: CRESTOR  What changed: Another medication with the same name was changed. Make sure you understand how and when to take each.    10 mg, Oral, Every Night at Bedtime        rosuvastatin 10 MG tablet  Commonly known as: CRESTOR  What changed:   medication strength  how much to take  when to take this    10 mg, Oral, Nightly                  Continue These Medications         Instructions Start Date   acetaminophen 325 MG tablet  Commonly known as: Tylenol    975 mg, Oral, Every 8 Hours, Take every 8 hours for 3 days then take prn as needed.        Azelastine HCl 137 MCG/SPRAY solution    Use 1 spray into the nostril(s) as directed by provider 2 (Two) Times a Day.        fluticasone 50 MCG/ACT nasal spray  Commonly known as: FLONASE    1 spray into the nostril(s) as directed by provider.        ibuprofen 200 MG tablet  Commonly known as: ADVIL,MOTRIN    800 mg, Oral, Every 8 Hours PRN        multivitamin with minerals tablet tablet    1 tablet, Oral, Daily        ondansetron 4 MG tablet  Commonly known as: Zofran    4 mg, Oral, Every 8 Hours PRN        rOPINIRole 1 MG tablet  Commonly known as: REQUIP    1 mg, Oral, Every Night at Bedtime, Take 1-3 hour before bedtime.        tiZANidine 4 MG tablet  Commonly known as: ZANAFLEX    Take 1 tablet by mouth at  bedtime as needed for muscle spasms.        topiramate 100 MG tablet  Commonly known as: TOPAMAX    100 mg, Oral, Daily        zolpidem 10 MG tablet  Commonly known as: Ambien    Take 1 tablet by mouth At Night As Needed---do not take with lunesta                     Activity: as tolerated     Diet: soft     Consults: none     Significant Diagnostic Studies:    CT Abdomen Pelvis With Contrast     Result Date: 12/22/2024  1. Marked improvement, near complete resolution of acute colitis/diverticulitis involving the descending colon on the CT from 4 days ago. Increased volume of fluid within the distal colon and rectum compatible with diarrhea. No bowel obstruction is identified. Normal appendix. 2. Heterogeneous appearance of the endometrium and myometrium as well as the lower uterine segment probably related to fibroids, including possible submucosal fibroids. Consider follow-up with pelvic ultrasound non-emergently. There is a septated cyst in the right ovary that measures 2.1 cm. This appear slightly smaller. 3. Probable hepatic steatosis. 4. Slight nodular thickening of adrenal glands which may be related to adenomatous change or adrenal hyperplasia.   This report was signed and finalized on 12/22/2024 2:33 PM by Dr. Roberto Banks MD.          oRberto Guillaume MD   12/23/2024, 08:37 CST                  Current Facility-Administered Medications   Medication Dose Route Frequency Provider Last Rate Last Admin    acetaminophen (TYLENOL) tablet 500 mg  500 mg Oral Q6H PRN Roberto Guillaume MD   500 mg at 12/20/24 0836    azelastine (ASTELIN) nasal spray 2 spray  2 spray Each Nare BID Roberto Guillaume MD   2 spray at 12/23/24 0830    sennosides-docusate (PERICOLACE) 8.6-50 MG per tablet 2 tablet  2 tablet Oral BID PRN Roberto Guillaume MD   2 tablet at 12/21/24 1348    And    polyethylene glycol (MIRALAX) packet 17 g  17 g Oral Daily PRN Roberto Guillaume MD        And    bisacodyl (DULCOLAX) EC tablet 5  mg  5 mg Oral Daily PRN Roberto Guillaume MD        And    bisacodyl (DULCOLAX) suppository 10 mg  10 mg Rectal Daily PRN Roberto Guillaume MD        Enoxaparin Sodium (LOVENOX) syringe 40 mg  40 mg Subcutaneous Daily Roberto Guillaume MD   40 mg at 12/22/24 1600    famotidine (PEPCID) tablet 20 mg  20 mg Oral BID PRN Eladio Villanueva MD   20 mg at 12/23/24 0025    fluticasone (FLONASE) 50 MCG/ACT nasal spray 1 spray  1 spray Nasal Daily Roberto Guillaume MD   1 spray at 12/23/24 0830    HYDROmorphone (DILAUDID) injection 0.5 mg  0.5 mg Intravenous Q4H PRN Roberto Guillaume MD   0.5 mg at 12/23/24 0829    ondansetron (ZOFRAN) injection 4 mg  4 mg Intravenous Q4H PRN Eladio Villanueva MD        ondansetron ODT (ZOFRAN-ODT) disintegrating tablet 8 mg  8 mg Translingual Q8H PRN Roberto Guillaume MD        propranolol LA (INDERAL LA) 24 hr capsule 120 mg  120 mg Oral Nightly Roberto Guillaume MD   120 mg at 12/22/24 2004    rOPINIRole (REQUIP) tablet 1 mg  1 mg Oral Nightly Roberto Guillaume MD   1 mg at 12/22/24 2004    rosuvastatin (CRESTOR) tablet 10 mg  10 mg Oral Nightly Roberto Guillaume MD   10 mg at 12/22/24 2005    sodium chloride 0.9 % flush 10 mL  10 mL Intravenous PRN Roberto Guillaume MD   10 mL at 12/21/24 2214    sodium chloride 0.9 % flush 10 mL  10 mL Intravenous Q12H Roberto Guillaume MD   10 mL at 12/23/24 0829    sodium chloride 0.9 % flush 10 mL  10 mL Intravenous PRN Roberto Guillaume MD        sodium chloride 0.9 % infusion 40 mL  40 mL Intravenous PRN Roberto Guillaume MD        tiZANidine (ZANAFLEX) tablet 4 mg  4 mg Oral Nightly PRN Roberto Guillaume MD        topiramate (TOPAMAX) tablet 100 mg  100 mg Oral Daily Roberto Guillaume MD   100 mg at 12/23/24 0829    zolpidem (AMBIEN) tablet 5 mg  5 mg Oral Nightly PRN Roberto Guillaume MD   5 mg at 12/22/24 2005

## 2024-12-23 NOTE — DISCHARGE SUMMARY
Hospital Discharge Summary    Gloria Melgoza  :  1980  MRN:  [id number]    Admit date:  2024  Discharge date:  2024    Admitting Physician:    Roberto Guillaume MD    Discharge Diagnoses:      Diverticulitis      Hospital Course:   The patient is a 44 y.o. female who presents with 2 to 3-day history of left lower quadrant abdominal pain fever chills and overall malaise.  Further workup in the emergency department reveals acute diverticulitis without evidence of perforation.  Given her degree of pain and discomfort she has been to for IV analgesia IV fluid resuscitation and IV antibiotics. She slowly recovered, repeat CT showed dramatic improvement in diverticulitis. DC on po Cipro and soft foods.    The patient was admitted for the above noted medical/surgical issues. Please see daily progress note for further details concerning their stay. The patient improved throughout their stay and reached maximum medical improvement on the day of discharge. The patient/family agree with the treatment plan as outlined above. All questions concerning their stay were answered prior to discharge. They understand the importance of follow up concerning any abnormal test results.     Physical Exam  Vitals and nursing note reviewed.   Constitutional:       Appearance: Normal appearance. She is normal weight.   HENT:      Head: Normocephalic and atraumatic.   Cardiovascular:      Rate and Rhythm: Normal rate and regular rhythm.      Pulses: Normal pulses.      Heart sounds: Normal heart sounds.   Pulmonary:      Effort: Pulmonary effort is normal.   Abdominal:      General: Abdomen is flat. Bowel sounds are normal.      Palpations: Abdomen is soft.   Skin:     General: Skin is warm.      Capillary Refill: Capillary refill takes less than 2 seconds.   Neurological:      General: No focal deficit present.           Discharge Medications:         Discharge Medications        New Medications        Instructions  Start Date   ciprofloxacin 500 MG tablet  Commonly known as: Cipro   500 mg, Oral, 2 Times Daily             Changes to Medications        Instructions Start Date   propranolol  MG 24 hr capsule  Commonly known as: INDERAL LA  What changed: when to take this   120 mg, Oral, Daily      rosuvastatin 10 MG tablet  Commonly known as: CRESTOR  What changed: Another medication with the same name was changed. Make sure you understand how and when to take each.   10 mg, Oral, Every Night at Bedtime      rosuvastatin 10 MG tablet  Commonly known as: CRESTOR  What changed:   medication strength  how much to take  when to take this   10 mg, Oral, Nightly             Continue These Medications        Instructions Start Date   acetaminophen 325 MG tablet  Commonly known as: Tylenol   975 mg, Oral, Every 8 Hours, Take every 8 hours for 3 days then take prn as needed.      Azelastine HCl 137 MCG/SPRAY solution   Use 1 spray into the nostril(s) as directed by provider 2 (Two) Times a Day.      fluticasone 50 MCG/ACT nasal spray  Commonly known as: FLONASE   1 spray into the nostril(s) as directed by provider.      ibuprofen 200 MG tablet  Commonly known as: ADVIL,MOTRIN   800 mg, Oral, Every 8 Hours PRN      multivitamin with minerals tablet tablet   1 tablet, Oral, Daily      ondansetron 4 MG tablet  Commonly known as: Zofran   4 mg, Oral, Every 8 Hours PRN      rOPINIRole 1 MG tablet  Commonly known as: REQUIP   1 mg, Oral, Every Night at Bedtime, Take 1-3 hour before bedtime.      tiZANidine 4 MG tablet  Commonly known as: ZANAFLEX   Take 1 tablet by mouth at bedtime as needed for muscle spasms.      topiramate 100 MG tablet  Commonly known as: TOPAMAX   100 mg, Oral, Daily      zolpidem 10 MG tablet  Commonly known as: Ambien   Take 1 tablet by mouth At Night As Needed---do not take with lunesta               Activity: as tolerated    Diet: soft    Consults: none    Significant Diagnostic Studies:    CT Abdomen Pelvis  With Contrast    Result Date: 12/22/2024  1. Marked improvement, near complete resolution of acute colitis/diverticulitis involving the descending colon on the CT from 4 days ago. Increased volume of fluid within the distal colon and rectum compatible with diarrhea. No bowel obstruction is identified. Normal appendix. 2. Heterogeneous appearance of the endometrium and myometrium as well as the lower uterine segment probably related to fibroids, including possible submucosal fibroids. Consider follow-up with pelvic ultrasound non-emergently. There is a septated cyst in the right ovary that measures 2.1 cm. This appear slightly smaller. 3. Probable hepatic steatosis. 4. Slight nodular thickening of adrenal glands which may be related to adenomatous change or adrenal hyperplasia.   This report was signed and finalized on 12/22/2024 2:33 PM by Dr. Roberto Banks MD.      CT Abdomen Pelvis With Contrast    Result Date: 12/18/2024   1.  Moderate to severe acute diverticulitis involving the distal descending colon. No definite evidence of macro perforation. No organized drainable collection/abscess.  2.  Uterine fibroids.    This report was signed and finalized on 12/18/2024 9:14 AM by Dr. Joseluis Mcarthur MD.            Treatments:   as above    Disposition:   home    48 minsTime spent on discharge including discussion with patient/family, SW, and coordination of care.     Follow up with Roberto Guillaume MD in 1 weeks.    Signed:  Roberto Guillaume MD   12/23/2024, 08:37 CST

## 2024-12-24 ENCOUNTER — READMISSION MANAGEMENT (OUTPATIENT)
Dept: CALL CENTER | Facility: HOSPITAL | Age: 44
End: 2024-12-24
Payer: COMMERCIAL

## 2024-12-24 NOTE — OUTREACH NOTE
Prep Survey      Flowsheet Row Responses   [facility] facility patient discharged from? [facility]   Is LACE score < 7 ? No   Eligibility Readm Mgmt   Discharge diagnosis Diverticulitis   Does the patient have one of the following disease processes/diagnoses(primary or secondary)? Other   Does the patient have Home health ordered? No   Is there a DME ordered? No   Prep survey completed? Yes            Laurence CADE - Registered Nurse

## 2024-12-24 NOTE — PAYOR COMM NOTE
"[facility]    Gloria Kiran (44 y.o. Female)       Date of Birth   1980    Social Security Number       Address   [address] [place] [address]    Home Phone   [phone]    MRN   [id number]       Gnosticism   Christianity    Marital Status                               Admission Date   24    Admission Type   Emergency    Admitting Provider   Roberto Guillaume MD    Attending Provider       Department, Room/Bed   [facility] 3C, 376/1       Discharge Date   2024    Discharge Disposition   Home or Self Care    Discharge Destination                                 Attending Provider: (none)   Allergies: Sulfa Antibiotics, Wound Dressing Adhesive    Isolation: None   Infection: None   Code Status: Prior    Ht: 157.5 cm (62\")   Wt: 66.6 kg (146 lb 12.8 oz)    Admission Cmt: None   Principal Problem: Diverticulitis [K57.92]                   Active Insurance as of 2024       Primary Coverage       Payor Plan Insurance Group Employer/Plan Group    [employer] [id number]       Payor Plan Address Payor Plan Phone Number Payor Plan Fax Number Effective Dates    [address] [phone]  2022 - None Entered    [place] [address]         Subscriber Name Subscriber Birth Date Member ID       GLORIA KIRAN 1980 [id number]                     Emergency Contacts        (Rel.) Home Phone Work Phone Mobile Phone    David Kiran (Spouse) [phone] -- [phone]    MyahCrista (Mother) [phone] -- --                 Discharge Summary        Roberto Guillaume MD at 24 0837            Hospital Discharge Summary    Gloria Kiran  :  1980  MRN:  [id number]    Admit date:  2024  Discharge date:  2024    Admitting Physician:    Roberto Guillaume MD    Discharge Diagnoses:      Diverticulitis      Hospital Course:   The patient is a 44 y.o. female who presents with 2 to 3-day " history of left lower quadrant abdominal pain fever chills and overall malaise.  Further workup in the emergency department reveals acute diverticulitis without evidence of perforation.  Given her degree of pain and discomfort she has been to for IV analgesia IV fluid resuscitation and IV antibiotics. She slowly recovered, repeat CT showed dramatic improvement in diverticulitis. DC on po Cipro and soft foods.    The patient was admitted for the above noted medical/surgical issues. Please see daily progress note for further details concerning their stay. The patient improved throughout their stay and reached maximum medical improvement on the day of discharge. The patient/family agree with the treatment plan as outlined above. All questions concerning their stay were answered prior to discharge. They understand the importance of follow up concerning any abnormal test results.     Physical Exam  Vitals and nursing note reviewed.   Constitutional:       Appearance: Normal appearance. She is normal weight.   HENT:      Head: Normocephalic and atraumatic.   Cardiovascular:      Rate and Rhythm: Normal rate and regular rhythm.      Pulses: Normal pulses.      Heart sounds: Normal heart sounds.   Pulmonary:      Effort: Pulmonary effort is normal.   Abdominal:      General: Abdomen is flat. Bowel sounds are normal.      Palpations: Abdomen is soft.   Skin:     General: Skin is warm.      Capillary Refill: Capillary refill takes less than 2 seconds.   Neurological:      General: No focal deficit present.           Discharge Medications:         Discharge Medications        New Medications        Instructions Start Date   ciprofloxacin 500 MG tablet  Commonly known as: Cipro   500 mg, Oral, 2 Times Daily             Changes to Medications        Instructions Start Date   propranolol  MG 24 hr capsule  Commonly known as: INDERAL LA  What changed: when to take this   120 mg, Oral, Daily      rosuvastatin 10 MG  tablet  Commonly known as: CRESTOR  What changed: Another medication with the same name was changed. Make sure you understand how and when to take each.   10 mg, Oral, Every Night at Bedtime      rosuvastatin 10 MG tablet  Commonly known as: CRESTOR  What changed:   medication strength  how much to take  when to take this   10 mg, Oral, Nightly             Continue These Medications        Instructions Start Date   acetaminophen 325 MG tablet  Commonly known as: Tylenol   975 mg, Oral, Every 8 Hours, Take every 8 hours for 3 days then take prn as needed.      Azelastine HCl 137 MCG/SPRAY solution   Use 1 spray into the nostril(s) as directed by provider 2 (Two) Times a Day.      fluticasone 50 MCG/ACT nasal spray  Commonly known as: FLONASE   1 spray into the nostril(s) as directed by provider.      ibuprofen 200 MG tablet  Commonly known as: ADVIL,MOTRIN   800 mg, Oral, Every 8 Hours PRN      multivitamin with minerals tablet tablet   1 tablet, Oral, Daily      ondansetron 4 MG tablet  Commonly known as: Zofran   4 mg, Oral, Every 8 Hours PRN      rOPINIRole 1 MG tablet  Commonly known as: REQUIP   1 mg, Oral, Every Night at Bedtime, Take 1-3 hour before bedtime.      tiZANidine 4 MG tablet  Commonly known as: ZANAFLEX   Take 1 tablet by mouth at bedtime as needed for muscle spasms.      topiramate 100 MG tablet  Commonly known as: TOPAMAX   100 mg, Oral, Daily      zolpidem 10 MG tablet  Commonly known as: Ambien   Take 1 tablet by mouth At Night As Needed---do not take with lunesta               Activity: as tolerated    Diet: soft    Consults: none    Significant Diagnostic Studies:    CT Abdomen Pelvis With Contrast    Result Date: 12/22/2024  1. Marked improvement, near complete resolution of acute colitis/diverticulitis involving the descending colon on the CT from 4 days ago. Increased volume of fluid within the distal colon and rectum compatible with diarrhea. No bowel obstruction is identified. Normal  appendix. 2. Heterogeneous appearance of the endometrium and myometrium as well as the lower uterine segment probably related to fibroids, including possible submucosal fibroids. Consider follow-up with pelvic ultrasound non-emergently. There is a septated cyst in the right ovary that measures 2.1 cm. This appear slightly smaller. 3. Probable hepatic steatosis. 4. Slight nodular thickening of adrenal glands which may be related to adenomatous change or adrenal hyperplasia.   This report was signed and finalized on 12/22/2024 2:33 PM by Dr. Roberto Banks MD.      CT Abdomen Pelvis With Contrast    Result Date: 12/18/2024   1.  Moderate to severe acute diverticulitis involving the distal descending colon. No definite evidence of macro perforation. No organized drainable collection/abscess.  2.  Uterine fibroids.    This report was signed and finalized on 12/18/2024 9:14 AM by Dr. Joseluis Mcarthur MD.            Treatments:   as above    Disposition:   home    48 minsTime spent on discharge including discussion with patient/family, SW, and coordination of care.     Follow up with Roberto Guillaume MD in 1 weeks.    Signed:  Roberto Guillaume MD   12/23/2024, 08:37 CST    Electronically signed by Roberto Guillaume MD at 12/23/24 0804

## 2024-12-30 ENCOUNTER — TRANSCRIBE ORDERS (OUTPATIENT)
Dept: ADMINISTRATIVE | Facility: HOSPITAL | Age: 44
End: 2024-12-30
Payer: COMMERCIAL

## 2024-12-30 ENCOUNTER — HOSPITAL ENCOUNTER (OUTPATIENT)
Dept: CT IMAGING | Facility: HOSPITAL | Age: 44
Discharge: HOME | End: 2024-12-30
Payer: COMMERCIAL

## 2024-12-30 ENCOUNTER — LAB (OUTPATIENT)
Dept: LAB | Facility: HOSPITAL | Age: 44
End: 2024-12-30
Payer: COMMERCIAL

## 2024-12-30 DIAGNOSIS — K57.92 DIVERTICULITIS: Primary | ICD-10-CM

## 2024-12-30 DIAGNOSIS — K57.92 DIVERTICULITIS: ICD-10-CM

## 2024-12-30 DIAGNOSIS — R10.32 ABDOMINAL PAIN, LEFT LOWER QUADRANT: ICD-10-CM

## 2024-12-30 LAB
ALBUMIN SERPL-MCNC: 4.7 G/DL (ref 3.5–5.2)
ALBUMIN/GLOB SERPL: 1.6 G/DL
ALP SERPL-CCNC: 101 U/L (ref 39–117)
ALT SERPL W P-5'-P-CCNC: 60 U/L (ref 1–33)
AMYLASE SERPL-CCNC: 46 U/L (ref 28–100)
ANION GAP SERPL CALCULATED.3IONS-SCNC: 13 MMOL/L (ref 5–15)
AST SERPL-CCNC: 21 U/L (ref 1–32)
BASOPHILS # BLD AUTO: 0.05 10*3/MM3 (ref 0–0.2)
BASOPHILS NFR BLD AUTO: 0.6 % (ref 0–1.5)
BILIRUB SERPL-MCNC: 0.5 MG/DL (ref 0–1.2)
BUN SERPL-MCNC: 9 MG/DL (ref 6–20)
BUN/CREAT SERPL: 11.8 (ref 7–25)
CALCIUM SPEC-SCNC: 9.5 MG/DL (ref 8.6–10.5)
CHLORIDE SERPL-SCNC: 105 MMOL/L (ref 98–107)
CO2 SERPL-SCNC: 20 MMOL/L (ref 22–29)
CREAT SERPL-MCNC: 0.76 MG/DL (ref 0.57–1)
DEPRECATED RDW RBC AUTO: 43.3 FL (ref 37–54)
EGFRCR SERPLBLD CKD-EPI 2021: 99.2 ML/MIN/1.73
EOSINOPHIL # BLD AUTO: 0.14 10*3/MM3 (ref 0–0.4)
EOSINOPHIL NFR BLD AUTO: 1.8 % (ref 0.3–6.2)
ERYTHROCYTE [DISTWIDTH] IN BLOOD BY AUTOMATED COUNT: 13.2 % (ref 12.3–15.4)
GLOBULIN UR ELPH-MCNC: 3 GM/DL
GLUCOSE SERPL-MCNC: 91 MG/DL (ref 65–99)
HCT VFR BLD AUTO: 45.7 % (ref 34–46.6)
HGB BLD-MCNC: 15.3 G/DL (ref 12–15.9)
IMM GRANULOCYTES # BLD AUTO: 0.02 10*3/MM3 (ref 0–0.05)
IMM GRANULOCYTES NFR BLD AUTO: 0.3 % (ref 0–0.5)
LIPASE SERPL-CCNC: 36 U/L (ref 13–60)
LYMPHOCYTES # BLD AUTO: 1.92 10*3/MM3 (ref 0.7–3.1)
LYMPHOCYTES NFR BLD AUTO: 24.6 % (ref 19.6–45.3)
MCH RBC QN AUTO: 30.3 PG (ref 26.6–33)
MCHC RBC AUTO-ENTMCNC: 33.5 G/DL (ref 31.5–35.7)
MCV RBC AUTO: 90.5 FL (ref 79–97)
MONOCYTES # BLD AUTO: 0.69 10*3/MM3 (ref 0.1–0.9)
MONOCYTES NFR BLD AUTO: 8.8 % (ref 5–12)
NEUTROPHILS NFR BLD AUTO: 4.99 10*3/MM3 (ref 1.7–7)
NEUTROPHILS NFR BLD AUTO: 63.9 % (ref 42.7–76)
NRBC BLD AUTO-RTO: 0 /100 WBC (ref 0–0.2)
PLATELET # BLD AUTO: 303 10*3/MM3 (ref 140–450)
PMV BLD AUTO: 11.5 FL (ref 6–12)
POTASSIUM SERPL-SCNC: 4 MMOL/L (ref 3.5–5.2)
PROT SERPL-MCNC: 7.7 G/DL (ref 6–8.5)
RBC # BLD AUTO: 5.05 10*6/MM3 (ref 3.77–5.28)
SODIUM SERPL-SCNC: 138 MMOL/L (ref 136–145)
WBC NRBC COR # BLD AUTO: 7.81 10*3/MM3 (ref 3.4–10.8)

## 2024-12-30 PROCEDURE — 25510000001 IOPAMIDOL PER 1 ML: Performed by: NURSE PRACTITIONER

## 2024-12-30 PROCEDURE — 85025 COMPLETE CBC W/AUTO DIFF WBC: CPT

## 2024-12-30 PROCEDURE — 83690 ASSAY OF LIPASE: CPT

## 2024-12-30 PROCEDURE — 36415 COLL VENOUS BLD VENIPUNCTURE: CPT

## 2024-12-30 PROCEDURE — 80053 COMPREHEN METABOLIC PANEL: CPT

## 2024-12-30 PROCEDURE — 74177 CT ABD & PELVIS W/CONTRAST: CPT

## 2024-12-30 PROCEDURE — 82150 ASSAY OF AMYLASE: CPT

## 2024-12-30 RX ORDER — IOPAMIDOL 755 MG/ML
100 INJECTION, SOLUTION INTRAVASCULAR
Status: COMPLETED | OUTPATIENT
Start: 2024-12-30 | End: 2024-12-30

## 2024-12-30 RX ADMIN — IOPAMIDOL 100 ML: 755 INJECTION, SOLUTION INTRAVENOUS at 14:19

## 2025-01-02 ENCOUNTER — OFFICE VISIT (OUTPATIENT)
Dept: SURGERY | Facility: CLINIC | Age: 45
End: 2025-01-02
Payer: COMMERCIAL

## 2025-01-02 VITALS
SYSTOLIC BLOOD PRESSURE: 126 MMHG | HEIGHT: 62 IN | BODY MASS INDEX: 26.85 KG/M2 | HEART RATE: 74 BPM | DIASTOLIC BLOOD PRESSURE: 85 MMHG | OXYGEN SATURATION: 98 %

## 2025-01-02 DIAGNOSIS — Z98.890 S/P LUMPECTOMY, LEFT BREAST: Primary | ICD-10-CM

## 2025-01-02 NOTE — PROGRESS NOTES
"Patient: Gloria Melgoza    YOB: 1980    Date: 01/02/2025    Primary Care Provider: Roberto Guillaume MD    Vital Signs:   Vitals:    01/02/25 1123   BP: 126/85   BP Location: Left arm   Patient Position: Sitting   Cuff Size: Adult   Pulse: 74   SpO2: 98%   Height: 157.5 cm (62\")       The patient is tolerating a regular diet and has no complaints s/p left lumpectomy x2 on 10/18/24 by Dr. Baker. The patient denies fevers, chills, nausea, vomiting, and excessive pain. The incisions are well healed without signs of infection or wound dehiscence.     Assessment / Plan:    Diagnoses and all orders for this visit:    1. S/P lumpectomy, left breast (Primary)        Gloria Melgoza is a 44 y.o. female who presents to the clinic 10 weeks s/p left lumpectomy x2. She is overall doing well at this time. The incisions are healing well. At this time, she will follow up in office in April 2025 with Dr. Baker for her 6 month follow up. She will be due for mammogram at 6 months. The order has already been placed. I instructed the patient to call for an appointment if she has any new problems or concerns. She voiced understanding and is agreeable to the plan.    Follow up:     Return for Next scheduled follow up.        Electronically signed by Danni Davidson PA-C  01/02/25  11:46 CST  "

## 2025-01-10 ENCOUNTER — READMISSION MANAGEMENT (OUTPATIENT)
Dept: CALL CENTER | Facility: HOSPITAL | Age: 45
End: 2025-01-10
Payer: COMMERCIAL

## 2025-01-10 NOTE — OUTREACH NOTE
Medical Week 3 Survey      Flowsheet Row Responses   [facility] patient discharged from? [place]   Does the patient have one of the following disease processes/diagnoses(primary or secondary)? Other   Week 3 attempt successful? No   Unsuccessful attempts Attempt 1   Revoke Other transitional program            ROSEMARY ELLIOTT - Registered Nurse

## 2025-01-23 ENCOUNTER — TRANSCRIBE ORDERS (OUTPATIENT)
Dept: ADMINISTRATIVE | Facility: HOSPITAL | Age: 45
End: 2025-01-23
Payer: COMMERCIAL

## 2025-01-23 DIAGNOSIS — N83.209 CYST OF OVARY, UNSPECIFIED LATERALITY: Primary | ICD-10-CM

## 2025-01-24 ENCOUNTER — HOSPITAL ENCOUNTER (OUTPATIENT)
Dept: ULTRASOUND IMAGING | Facility: HOSPITAL | Age: 45
Discharge: HOME | End: 2025-01-24
Admitting: PHYSICIAN ASSISTANT
Payer: COMMERCIAL

## 2025-01-24 DIAGNOSIS — N83.209 CYST OF OVARY, UNSPECIFIED LATERALITY: ICD-10-CM

## 2025-01-24 PROCEDURE — 76830 TRANSVAGINAL US NON-OB: CPT

## 2025-02-14 ENCOUNTER — OFFICE VISIT (OUTPATIENT)
Dept: OBSTETRICS AND GYNECOLOGY | Age: 45
End: 2025-02-14
Payer: COMMERCIAL

## 2025-02-14 ENCOUNTER — TELEPHONE (OUTPATIENT)
Dept: OBSTETRICS AND GYNECOLOGY | Age: 45
End: 2025-02-14
Payer: COMMERCIAL

## 2025-02-14 VITALS
BODY MASS INDEX: 26.83 KG/M2 | SYSTOLIC BLOOD PRESSURE: 122 MMHG | WEIGHT: 145.8 LBS | DIASTOLIC BLOOD PRESSURE: 76 MMHG | HEIGHT: 62 IN

## 2025-02-14 DIAGNOSIS — D25.1 INTRAMURAL LEIOMYOMA OF UTERUS: Primary | ICD-10-CM

## 2025-02-14 DIAGNOSIS — R10.2 PELVIC PRESSURE IN FEMALE: ICD-10-CM

## 2025-02-14 DIAGNOSIS — Z80.3 FAMILY HISTORY OF BREAST CANCER: ICD-10-CM

## 2025-02-14 DIAGNOSIS — N89.8 VAGINAL DISCHARGE: ICD-10-CM

## 2025-02-14 DIAGNOSIS — N83.291 COMPLEX CYST OF RIGHT OVARY: ICD-10-CM

## 2025-02-14 DIAGNOSIS — N95.1 PERIMENOPAUSAL: ICD-10-CM

## 2025-02-14 DIAGNOSIS — N94.10 FEMALE DYSPAREUNIA: ICD-10-CM

## 2025-02-14 DIAGNOSIS — Z98.51 HISTORY OF TUBAL LIGATION: ICD-10-CM

## 2025-02-14 DIAGNOSIS — N85.7 HEMATOMETRA: ICD-10-CM

## 2025-02-14 DIAGNOSIS — N60.99 ATYPICAL DUCTAL HYPERPLASIA OF BREAST: ICD-10-CM

## 2025-02-14 DIAGNOSIS — Z98.890 HISTORY OF ENDOMETRIAL ABLATION: ICD-10-CM

## 2025-02-14 PROCEDURE — 88305 TISSUE EXAM BY PATHOLOGIST: CPT | Performed by: OBSTETRICS & GYNECOLOGY

## 2025-02-14 RX ORDER — SODIUM CHLORIDE 0.9 % (FLUSH) 0.9 %
1-10 SYRINGE (ML) INJECTION AS NEEDED
OUTPATIENT
Start: 2025-02-14

## 2025-02-14 RX ORDER — SODIUM CHLORIDE 9 MG/ML
100 INJECTION, SOLUTION INTRAVENOUS CONTINUOUS
OUTPATIENT
Start: 2025-02-14 | End: 2025-02-15

## 2025-02-14 RX ORDER — SODIUM CHLORIDE 0.9 % (FLUSH) 0.9 %
10 SYRINGE (ML) INJECTION EVERY 12 HOURS SCHEDULED
OUTPATIENT
Start: 2025-02-14

## 2025-02-14 RX ORDER — METRONIDAZOLE 500 MG/100ML
500 INJECTION, SOLUTION INTRAVENOUS ONCE
OUTPATIENT
Start: 2025-02-14 | End: 2025-02-14

## 2025-02-14 RX ORDER — SODIUM CHLORIDE 9 MG/ML
40 INJECTION, SOLUTION INTRAVENOUS AS NEEDED
OUTPATIENT
Start: 2025-02-14

## 2025-02-14 NOTE — PROGRESS NOTES
Bill To MD  [facility]  [address]  [place] [address]  Office [phone]  Fax [phone]      [facility]  Gloria Melgoza  : 1980  MRN: [id number]    Subjective   Subjective     Chief Complaint   Patient presents with    Fibroids     Pt here as new gyn for fibroids. U/S done on  at [facility] (scanned into chart)        History of Present Illness  Gloria Melgoza is a 44 y.o. female , , who comes to the office today for consult. Diagnosed with fibroids and ovarian cyst incidentally.  No bleeding since ablation/tubal in 2018. Cramping recently past few months. Different from her diverticulosis. More so having pelvic pressure. Voiding - otherwise normal. May notice symptoms of urge right after voids. Constipation - denies changes. Has been working on medications for this prior to recently. Some vaginal discharge. Mucous. Pain with intercourse - more after intercourse. Perimenopause symptoms - has noted mood changes and hot flushes for past year.     Review of Systems   Genitourinary:  Positive for dyspareunia, pelvic pain and vaginal discharge. Negative for decreased urine volume, difficulty urinating, dysuria, enuresis, flank pain, frequency, genital sores, hematuria, menstrual problem, urgency, vaginal bleeding and vaginal pain.   All other systems reviewed and are negative.       Personal History     The following portions of the patient's history were reviewed and updated as appropriate: allergies, current medications, past family history, past medical history, past social history, past surgical history, and problem list.    OB hx:  OB History    Para Term  AB Living   4 2 2   2     SAB IAB Ectopic Molar Multiple Live Births   2         2      # Outcome Date GA Lbr Jarod/2nd Weight Sex Type Anes PTL Lv   4 SAB            3 SAB            2 Term            1 Term                 GYN hx:  Date of LMP: No LMP recorded. Patient has had an  ablation.  Age at menarche: 9     Menopause: hot flushes/symptoms   Menses: none   Flow: none  Date/Result of last Pap smear: she reports her last PAP was normal   History of abnormal PAP: Yes. Details: 20s cryotyerapy  History of Sexually Transmitted Infection: No  Current Birth Control Method: tubal sterilization  History of Contraception: Yes. Details: IUD, depo, pills  HRT: Yes. Details: trailed for 6 months  Sexually active: Yes. Details: male partner   FMH of Breast, Uterine, Ovarian, or Colon cancer: Yes. Details: breast & colon cancer  Additional OB/GYN History (not otherwise listed):  -h/o c/s x2  -h/o endometrial ablation and tubal for menses    Past Medical History:   Diagnosis Date    Abnormal Pap smear of cervix In my 20’s    Severe dysplasia    Anxiety     Asthma     Increasing shortness of breath in revent months    Cervical dysplasia     In my 20’s, had it frozen off in doctor’s office    Dental disease     Almost all top teeth are crowns    Depression     Dizziness     Always attributed to blood pressure    Fibroid     GERD (gastroesophageal reflux disease)     Headache     Hyperlipidemia     Hypertension     Insomnia     Migraine     Muscle spasm of right shoulder     Nosebleed     Ovarian cyst     On recent CT    PMS (premenstrual syndrome)     Recurrent pregnancy loss, antepartum condition or complication     One right before each live pregnancy/birth    Sinusitis     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    Urinary tract infection     Varicella     As a young child       Past Surgical History:   Procedure Laterality Date    BREAST BIOPSY Right     benign    BREAST LUMPECTOMY WITH SENTINEL NODE BIOPSY Left 10/18/2024    Procedure: LEFT BREAST LUMPECTOMY WITH MAG SEED GUIDANCE x 2;  Surgeon: Delfina Baker MD;  Location: [facility] OR;  Service: General;  Laterality: Left;    CARPAL TUNNEL RELEASE Bilateral      SECTION Bilateral     x2    COLONOSCOPY       ENDOMETRIAL ABLATION Bilateral 2018    tubal ligtion at same time    ENDOSCOPIC FUNCTIONAL SINUS SURGERY (FESS) Bilateral 2024    Procedure: Bilateral functional endoscopic anterior ethmoidectomy with bilateral middle meatal antrostomy, Septoplasty, and Bilateral inferior turbinate reduction via Coblation;  Surgeon: Jadiel Smith MD;  Location: [facility];  Service: ENT;  Laterality: Bilateral;    LAPAROSCOPIC TUBAL LIGATION      SINUS SURGERY  24    TUBAL ABDOMINAL LIGATION  2018    WISDOM TOOTH EXTRACTION      In my 20’s       Current Outpatient Medications   Medication Instructions    acetaminophen (TYLENOL) 975 mg, Oral, Every 8 Hours, Take every 8 hours for 3 days then take prn as needed.    Azelastine HCl 137 MCG/SPRAY solution Use 1 spray into the nostril(s) as directed by provider 2 (Two) Times a Day.    fluticasone (FLONASE) 50 MCG/ACT nasal spray 1 spray, Nasal, Daily    ibuprofen (ADVIL,MOTRIN) 800 mg, Every 8 Hours PRN    multivitamin with minerals tablet tablet 1 tablet, Daily    ondansetron (ZOFRAN) 4 mg, Oral, Every 8 Hours PRN    propranolol LA (INDERAL LA) 120 mg, Oral, Daily    rOPINIRole (REQUIP) 1 mg, Oral, Every Night at Bedtime, Take 1-3 hour before bedtime.    rosuvastatin (CRESTOR) 10 mg, Oral, Nightly    tiZANidine (ZANAFLEX) 4 MG tablet Take 1 tablet by mouth at bedtime as needed for muscle spasms.    topiramate (TOPAMAX) 100 mg, Oral, Daily    venlafaxine XR (EFFEXOR-XR) 37.5 MG 24 hr capsule Take 1 capsule by mouth Daily with food    zolpidem (AMBIEN) 10 mg, Oral, Nightly PRN       Allergies   Allergen Reactions    Sulfa Antibiotics Rash    Wound Dressing Adhesive Rash       Social History     Socioeconomic History    Marital status:    Tobacco Use    Smoking status: Former     Current packs/day: 0.00     Average packs/day: 0.9 packs/day for 33.3 years (30.0 ttl pk-yrs)     Types: Cigarettes     Quit date: 2024     Years since quittin.9    Smokeless  "tobacco: Never    Tobacco comments:     Don’t smoke cigarettes but have started using vape   Vaping Use    Vaping status: Every Day    Substances: Nicotine, Flavoring   Substance and Sexual Activity    Alcohol use: Yes     Alcohol/week: 2.0 standard drinks of alcohol     Types: 2 Glasses of wine per week    Drug use: Never    Sexual activity: Yes     Partners: Male     Birth control/protection: Tubal ligation       Family History   Problem Relation Age of Onset    Seizures Mother     Migraines Mother     Cancer Mother         Pre cancerous lesions in the colon    Hypertension Father     Diabetes Father     Breast cancer Paternal Grandmother     Cancer Paternal Grandmother         Breast and Colon Cancer    Cancer Maternal Grandmother         Bladder    Cancer Paternal Grandfather         Lung    Cancer Paternal Uncle         Bladder       Objective    Objective     Vitals:   Visit Vitals  /76   Ht 157.5 cm (62\")   Wt 66.1 kg (145 lb 12.8 oz)   BMI 26.67 kg/m²        Physical Exam  Vitals and nursing note reviewed. Exam conducted with a chaperone present.   Constitutional:       General: She is not in acute distress.     Appearance: Normal appearance. She is well-developed.   HENT:      Head: Normocephalic and atraumatic.   Eyes:      General: No scleral icterus.     Conjunctiva/sclera: Conjunctivae normal.   Pulmonary:      Effort: Pulmonary effort is normal. No respiratory distress.   Abdominal:      General: There is no distension.      Palpations: Abdomen is soft. There is no mass.      Tenderness: There is no abdominal tenderness.   Genitourinary:     General: Normal vulva.      Exam position: Lithotomy position.      Pubic Area: No pubic lice.       Labia:         Right: No tenderness or lesion.         Left: No tenderness or lesion.       Urethra: No prolapse.      Vagina: No signs of injury and foreign body. Vaginal discharge (thick clear mucoid discharge) present. No erythema, tenderness, bleeding, " lesions or prolapsed vaginal walls.      Cervix: Normal.      Uterus: Enlarged. Not deviated, not fixed, not tender and no uterine prolapse.       Adnexa:         Right: Tenderness present. No mass or fullness.          Left: Tenderness present. No mass or fullness.        Rectum: No external hemorrhoid.      Comments: Uterus enlarged and bulky, fills pelvis. Bilateral tenderness on palpation of ovaries. Right ovary with palpable cyst  Musculoskeletal:      Right lower leg: No edema.      Left lower leg: No edema.   Skin:     General: Skin is warm and dry.      Coloration: Skin is not cyanotic, jaundiced or pale.   Neurological:      General: No focal deficit present.      Mental Status: She is alert and oriented to person, place, and time.   Psychiatric:         Mood and Affect: Mood normal.         Behavior: Behavior is cooperative.         Procedure   The following procedures were performed in the clinic today:  Endometrial Biopsy    Date/Time: 2/14/2025 11:19 AM    Performed by: Bill To MD  Authorized by: Bill To MD    Consent:     Consent obtained: verbal    Consent given by: patient    Risks discussed: bleeding, infection, need for repeat procedure and pain    Alternatives discussed: alternative treatment    Patient agrees, verbalizes understanding, and wants to proceed: yes    Universal protocol:     Immediately prior to procedure a time out was called: yes      Patient identity confirmed: verbally with patient  Pre-procedure:     Urine pregnancy test: negative    Procedure:     Prepped with: Betadine    Tenaculum used: no      A local block was performed: no      Cervix dilated: no      Number of passes: 2  Findings:     Cervix: normal      Uterus depth by sound (cm): 6    Specimen collected: specimen collected and sent to pathology      Patient tolerance: tolerated well, no immediate complications           Result Review    CT Abdomen Pelvis With Contrast (12/30/2024 14:17)  Stable 3.2 cm  septated right ovarian cyst.   Fibroid uterus    US Non-ob Transvaginal (01/24/2025 08:24)  The uterus measures 9.7 cm in sagittal dimension by 6.2 cm in anterior  to posterior dimension by 5.9 cm in transverse dimension. The uterus is  diffusely heterogeneous and nodular in appearance with multiple  fibroids. This includes a posterior fundal fibroid measuring 4.3 x 2.9 x  3.8 cm in size and an anterior fundal fibroid measuring 3.2 x 2.9 x 3.3  cm in size.     There is a complex/septated nabothian cyst within the cervix measuring  1.2 x 0.9 x 0.7 cm in size.     Endometrial stripe is within normal limits with a double layer thickness  of 7 mm.     No free fluid in the cul-de-sac.     The right ovary measures 3.5 x 1.9 x 2.8 cm in size. There is an  associated complex lesion with what appears to be a calcified rim  measuring 2.5 x 1.9 x 2.2 cm in size. There is normal color flow to the  right ovary.     The left ovary measures 3.8 x 2.1 x 1.8 cm in size and is normal in  sonographic appearance with normal color flow.        Assessment & Plan   Assessment / Plan     Diagnoses and all orders for this visit:    1. Intramural leiomyoma of uterus (Primary)  -     Tissue Pathology Exam  -     Case Request; Standing  -     Pregnancy, Urine - Urine, Clean Catch; Future  -     Type & Screen; Future  -     sodium chloride 0.9 % flush 10 mL  -     sodium chloride 0.9 % flush 1-10 mL  -     sodium chloride 0.9 % infusion 40 mL  -     sodium chloride 0.9 % infusion  -     ceFAZolin (ANCEF) 2,000 mg in sodium chloride 0.9 % 100 mL IVPB  -     metroNIDAZOLE (FLAGYL) IVPB 500 mg  -     Case Request  -     Endometrial Biopsy    2. Complex cyst of right ovary  -     Ovarian Malignancy Risk-FLORINDA  -     Case Request; Standing  -     Pregnancy, Urine - Urine, Clean Catch; Future  -     Type & Screen; Future  -     sodium chloride 0.9 % flush 10 mL  -     sodium chloride 0.9 % flush 1-10 mL  -     sodium chloride 0.9 % infusion 40 mL  -      sodium chloride 0.9 % infusion  -     ceFAZolin (ANCEF) 2,000 mg in sodium chloride 0.9 % 100 mL IVPB  -     metroNIDAZOLE (FLAGYL) IVPB 500 mg  -     Case Request    3. History of tubal ligation  -     Case Request; Standing  -     Pregnancy, Urine - Urine, Clean Catch; Future  -     Type & Screen; Future  -     sodium chloride 0.9 % flush 10 mL  -     sodium chloride 0.9 % flush 1-10 mL  -     sodium chloride 0.9 % infusion 40 mL  -     sodium chloride 0.9 % infusion  -     ceFAZolin (ANCEF) 2,000 mg in sodium chloride 0.9 % 100 mL IVPB  -     metroNIDAZOLE (FLAGYL) IVPB 500 mg  -     Case Request    4. History of endometrial ablation  -     Tissue Pathology Exam  -     Case Request; Standing  -     Pregnancy, Urine - Urine, Clean Catch; Future  -     Type & Screen; Future  -     sodium chloride 0.9 % flush 10 mL  -     sodium chloride 0.9 % flush 1-10 mL  -     sodium chloride 0.9 % infusion 40 mL  -     sodium chloride 0.9 % infusion  -     ceFAZolin (ANCEF) 2,000 mg in sodium chloride 0.9 % 100 mL IVPB  -     metroNIDAZOLE (FLAGYL) IVPB 500 mg  -     Case Request  -     Endometrial Biopsy    5. Pelvic pressure in female  -     Case Request; Standing  -     Pregnancy, Urine - Urine, Clean Catch; Future  -     Type & Screen; Future  -     sodium chloride 0.9 % flush 10 mL  -     sodium chloride 0.9 % flush 1-10 mL  -     sodium chloride 0.9 % infusion 40 mL  -     sodium chloride 0.9 % infusion  -     ceFAZolin (ANCEF) 2,000 mg in sodium chloride 0.9 % 100 mL IVPB  -     metroNIDAZOLE (FLAGYL) IVPB 500 mg  -     Case Request    6. Vaginal discharge  -     Tissue Pathology Exam  -     Endometrial Biopsy    7. Family history of breast cancer  -     Case Request; Standing  -     Pregnancy, Urine - Urine, Clean Catch; Future  -     Type & Screen; Future  -     sodium chloride 0.9 % flush 10 mL  -     sodium chloride 0.9 % flush 1-10 mL  -     sodium chloride 0.9 % infusion 40 mL  -     sodium chloride 0.9 %  infusion  -     ceFAZolin (ANCEF) 2,000 mg in sodium chloride 0.9 % 100 mL IVPB  -     metroNIDAZOLE (FLAGYL) IVPB 500 mg  -     Case Request    8. Atypical ductal hyperplasia of breast  -     Case Request; Standing  -     Pregnancy, Urine - Urine, Clean Catch; Future  -     Type & Screen; Future  -     sodium chloride 0.9 % flush 10 mL  -     sodium chloride 0.9 % flush 1-10 mL  -     sodium chloride 0.9 % infusion 40 mL  -     sodium chloride 0.9 % infusion  -     ceFAZolin (ANCEF) 2,000 mg in sodium chloride 0.9 % 100 mL IVPB  -     metroNIDAZOLE (FLAGYL) IVPB 500 mg  -     Case Request    9. Female dyspareunia  -     Case Request; Standing  -     Pregnancy, Urine - Urine, Clean Catch; Future  -     Type & Screen; Future  -     sodium chloride 0.9 % flush 10 mL  -     sodium chloride 0.9 % flush 1-10 mL  -     sodium chloride 0.9 % infusion 40 mL  -     sodium chloride 0.9 % infusion  -     ceFAZolin (ANCEF) 2,000 mg in sodium chloride 0.9 % 100 mL IVPB  -     metroNIDAZOLE (FLAGYL) IVPB 500 mg  -     Case Request    10. Hematometra    11. Perimenopausal    Other orders  -     Follow Anesthesia Guidelines / Protocol; Future  -     Follow Anesthesia Guidelines / Protocol; Standing  -     Clip Operative Site; Standing  -     Verify / Perform Chlorhexidine Skin Prep; Standing  -     Provide NPO Instructions to Patient; Future  -     Chlorhexidine Skin Prep; Future  -     Pregnancy, Urine - Urine, Clean Catch; Standing  -     Type & Screen; Standing  -     Insert Peripheral IV; Standing  -     Saline Lock & Maintain IV Access; Standing  -     Place Sequential Compression Device; Standing  -     Maintain Sequential Compression Device; Standing          Discussion:   Complex ovarian cyst and bulky uterus and fibroids. Cramping and discomfort. Endometrium is visible - ablation is failing (hematometra). She desires definitive management in the form of hysterectomy and oophorectomy. This is reasonable given her history  and precancer lesion in the breast. Pap smear - completed with PCP earlier. Reported normal. Records request for this. EMB recommended to rule out hyperplasia/malignancy. Low suspicion for this. She is agreeable to this. Post-care EMB discussed. Hysterectomy and oophorectomy discussed. Robot-assisted TLH/BSO/cystoscopy reviewed. Route, surgery, expectations, postoperative recovery and limitations reviewed. Return to work discussed. Questions answered. She expressed understanding. She will return for pre-op visit. Plan for surgery on 3/24.     Follow-up: Return in about 18 days (around 3/4/2025) for Pre-Op.    Bill To MD

## 2025-02-16 LAB
CANCER AG125 SERPL-ACNC: 7.6 U/ML (ref 0–38.1)
HE4 SERPL-SCNC: 57.4 PMOL/L (ref 0–63.6)
LABORATORY COMMENT REPORT: NORMAL
POSTMENOPAUSAL INTERP: LOW: NORMAL
PREMENOPAUSAL INTERP: LOW: NORMAL
ROMA SCORE POSTMEN SERPL: 0.84
ROMA SCORE PREMEN SERPL: 0.97

## 2025-03-04 ENCOUNTER — OFFICE VISIT (OUTPATIENT)
Dept: OBSTETRICS AND GYNECOLOGY | Age: 45
End: 2025-03-04
Payer: COMMERCIAL

## 2025-03-04 ENCOUNTER — PRE-ADMISSION TESTING (OUTPATIENT)
Dept: PREADMISSION TESTING | Facility: HOSPITAL | Age: 45
End: 2025-03-04
Payer: COMMERCIAL

## 2025-03-04 VITALS
RESPIRATION RATE: 18 BRPM | BODY MASS INDEX: 27.87 KG/M2 | WEIGHT: 151.46 LBS | SYSTOLIC BLOOD PRESSURE: 137 MMHG | HEART RATE: 95 BPM | HEIGHT: 62 IN | DIASTOLIC BLOOD PRESSURE: 99 MMHG | OXYGEN SATURATION: 96 %

## 2025-03-04 VITALS
WEIGHT: 150 LBS | HEIGHT: 62 IN | SYSTOLIC BLOOD PRESSURE: 122 MMHG | BODY MASS INDEX: 27.6 KG/M2 | DIASTOLIC BLOOD PRESSURE: 84 MMHG

## 2025-03-04 DIAGNOSIS — D25.1 INTRAMURAL LEIOMYOMA OF UTERUS: ICD-10-CM

## 2025-03-04 DIAGNOSIS — Z98.51 HISTORY OF TUBAL LIGATION: ICD-10-CM

## 2025-03-04 DIAGNOSIS — N95.1 PERIMENOPAUSAL: ICD-10-CM

## 2025-03-04 DIAGNOSIS — N83.291 COMPLEX CYST OF RIGHT OVARY: ICD-10-CM

## 2025-03-04 DIAGNOSIS — R10.2 PELVIC PRESSURE IN FEMALE: ICD-10-CM

## 2025-03-04 DIAGNOSIS — N94.10 FEMALE DYSPAREUNIA: ICD-10-CM

## 2025-03-04 DIAGNOSIS — N85.7 HEMATOMETRA: ICD-10-CM

## 2025-03-04 DIAGNOSIS — Z80.3 FAMILY HISTORY OF BREAST CANCER: ICD-10-CM

## 2025-03-04 DIAGNOSIS — Z98.890 HISTORY OF ENDOMETRIAL ABLATION: ICD-10-CM

## 2025-03-04 DIAGNOSIS — N60.99 ATYPICAL DUCTAL HYPERPLASIA OF BREAST: ICD-10-CM

## 2025-03-04 DIAGNOSIS — D25.1 INTRAMURAL LEIOMYOMA OF UTERUS: Primary | ICD-10-CM

## 2025-03-04 LAB
ABO GROUP BLD: NORMAL
ANION GAP SERPL CALCULATED.3IONS-SCNC: 13 MMOL/L (ref 5–15)
BLD GP AB SCN SERPL QL: NEGATIVE
BUN SERPL-MCNC: 11 MG/DL (ref 6–20)
BUN/CREAT SERPL: 16.2 (ref 7–25)
CALCIUM SPEC-SCNC: 9.3 MG/DL (ref 8.6–10.5)
CHLORIDE SERPL-SCNC: 109 MMOL/L (ref 98–107)
CO2 SERPL-SCNC: 20 MMOL/L (ref 22–29)
CREAT SERPL-MCNC: 0.68 MG/DL (ref 0.57–1)
DEPRECATED RDW RBC AUTO: 42 FL (ref 37–54)
EGFRCR SERPLBLD CKD-EPI 2021: 110.3 ML/MIN/1.73
ERYTHROCYTE [DISTWIDTH] IN BLOOD BY AUTOMATED COUNT: 12.9 % (ref 12.3–15.4)
GLUCOSE SERPL-MCNC: 131 MG/DL (ref 65–99)
HCT VFR BLD AUTO: 40.3 % (ref 34–46.6)
HGB BLD-MCNC: 13.6 G/DL (ref 12–15.9)
MCH RBC QN AUTO: 30 PG (ref 26.6–33)
MCHC RBC AUTO-ENTMCNC: 33.7 G/DL (ref 31.5–35.7)
MCV RBC AUTO: 89 FL (ref 79–97)
PLATELET # BLD AUTO: 239 10*3/MM3 (ref 140–450)
PMV BLD AUTO: 11.5 FL (ref 6–12)
POTASSIUM SERPL-SCNC: 3.3 MMOL/L (ref 3.5–5.2)
RBC # BLD AUTO: 4.53 10*6/MM3 (ref 3.77–5.28)
RH BLD: POSITIVE
SODIUM SERPL-SCNC: 142 MMOL/L (ref 136–145)
T&S EXPIRATION DATE: NORMAL
WBC NRBC COR # BLD AUTO: 6.34 10*3/MM3 (ref 3.4–10.8)

## 2025-03-04 PROCEDURE — 85027 COMPLETE CBC AUTOMATED: CPT

## 2025-03-04 PROCEDURE — 86850 RBC ANTIBODY SCREEN: CPT

## 2025-03-04 PROCEDURE — 36415 COLL VENOUS BLD VENIPUNCTURE: CPT

## 2025-03-04 PROCEDURE — 86901 BLOOD TYPING SEROLOGIC RH(D): CPT

## 2025-03-04 PROCEDURE — 80048 BASIC METABOLIC PNL TOTAL CA: CPT

## 2025-03-04 PROCEDURE — 86900 BLOOD TYPING SEROLOGIC ABO: CPT

## 2025-03-04 NOTE — PROGRESS NOTES
"    Bill To MD  [facility]  [address]  [place] [address]  Office [phone]  Fax [phone]      [facility]  Gloria Melgoza  : 1980  MRN: [id number]    Subjective   Subjective     Chief Complaint   Patient presents with    Pre-op Exam     Pt here for pre-op visit. Scheduled for TLH BSO on 3/24.       History of Present Illness  Gloria Melgoza is a 44 y.o. female , , who comes to the office today for pre-op visit. Scheduled for robot-TLH/BSO on 3/24. Reports ready for surgery. Pelvic pressure has worsened since last appointment. Last visit \"Diagnosed with fibroids and ovarian cyst incidentally.  No bleeding since ablation/tubal in 2018. Cramping recently past few months. Different from her diverticulosis. More so having pelvic pressure. Voiding - otherwise normal. May notice symptoms of urge right after voids. Constipation - denies changes. Has been working on medications for this prior to recently. Some vaginal discharge. Mucous. Pain with intercourse - more after intercourse. Perimenopause symptoms - has noted mood changes and hot flushes for past year.\"      Review of Systems   Genitourinary:  Positive for dyspareunia, pelvic pain and vaginal discharge. Negative for decreased urine volume, difficulty urinating, dysuria, enuresis, flank pain, frequency, genital sores, hematuria, menstrual problem, urgency, vaginal bleeding and vaginal pain.   All other systems reviewed and are negative.       Personal History     The following portions of the patient's history were reviewed and updated as appropriate: allergies, current medications, past family history, past medical history, past social history, past surgical history, and problem list.    OB hx:                    OB History    Para Term  AB Living   4 2 2   2     SAB IAB Ectopic Molar Multiple Live Births    2         2       # Outcome Date GA Lbr Jarod/2nd Weight Sex Type Anes PTL Lv   4 SAB          "            3 SAB                     2 Term                     1 Term                           GYN hx:  Date of LMP: No LMP recorded. Patient has had an ablation.  Age at menarche: 9     Menopause: hot flushes/symptoms 2024  Menses: none   Flow: none  Date/Result of last Pap smear: she reports her last PAP was normal   History of abnormal PAP: Yes. Details: 20s cryotyerapy  History of Sexually Transmitted Infection: No  Current Birth Control Method: tubal sterilization  History of Contraception: Yes. Details: IUD, depo, pills  HRT: Yes. Details: trailed for 6 months  Sexually active: Yes. Details: male partner   FMH of Breast, Uterine, Ovarian, or Colon cancer: Yes. Details: breast & colon cancer  Additional OB/GYN History (not otherwise listed):  -h/o c/s x2  -h/o endometrial ablation and tubal for menses       Past Medical History:   Diagnosis Date    Abnormal Pap smear of cervix In my 20’s    Severe dysplasia    Anxiety     Asthma     Increasing shortness of breath in revent months    Cervical dysplasia     In my 20’s, had it frozen off in doctor’s office    Dental disease     Almost all top teeth are crowns    Depression     Dizziness     Always attributed to blood pressure    Fibroid     GERD (gastroesophageal reflux disease)     Headache     Hyperlipidemia     Hypertension     Insomnia     Migraine     Muscle spasm of right shoulder     Nosebleed     Ovarian cyst     On recent CT    PMS (premenstrual syndrome)     Recurrent pregnancy loss, antepartum condition or complication     One right before each live pregnancy/birth    Sinusitis     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    Urinary tract infection     Varicella     As a young child       Past Surgical History:   Procedure Laterality Date    BREAST BIOPSY Right     benign    BREAST LUMPECTOMY WITH SENTINEL NODE BIOPSY Left 10/18/2024    Procedure: LEFT BREAST LUMPECTOMY WITH MAG SEED GUIDANCE x 2;  Surgeon: Delfina Baker MD;   Location:  [facility] OR;  Service: General;  Laterality: Left;    CARPAL TUNNEL RELEASE Bilateral      SECTION Bilateral     x2    COLONOSCOPY      ENDOMETRIAL ABLATION Bilateral 2018    tubal ligtion at same time    ENDOSCOPIC FUNCTIONAL SINUS SURGERY (FESS) Bilateral 2024    Procedure: Bilateral functional endoscopic anterior ethmoidectomy with bilateral middle meatal antrostomy, Septoplasty, and Bilateral inferior turbinate reduction via Coblation;  Surgeon: Jadiel Smith MD;  Location:  [facility] OR;  Service: ENT;  Laterality: Bilateral;    LAPAROSCOPIC TUBAL LIGATION      SINUS SURGERY  24    TUBAL ABDOMINAL LIGATION  2018    WISDOM TOOTH EXTRACTION      In my 20’s       Current Outpatient Medications   Medication Instructions    acetaminophen (TYLENOL) 975 mg, Oral, Every 8 Hours, Take every 8 hours for 3 days then take prn as needed.    Azelastine HCl 137 MCG/SPRAY solution Use 1 spray into the nostril(s) as directed by provider 2 (Two) Times a Day.    fluticasone (FLONASE) 50 MCG/ACT nasal spray 1 spray, Nasal, Daily    ibuprofen (ADVIL,MOTRIN) 800 mg, Every 8 Hours PRN    multivitamin with minerals tablet tablet 1 tablet, Daily    ondansetron (ZOFRAN) 4 mg, Oral, Every 8 Hours PRN    propranolol LA (INDERAL LA) 120 mg, Oral, Daily    rOPINIRole (REQUIP) 1 mg, Oral, Every Night at Bedtime, Take 1-3 hour before bedtime.    rosuvastatin (CRESTOR) 10 mg, Oral, Nightly    tiZANidine (ZANAFLEX) 4 MG tablet Take 1 tablet by mouth at bedtime as needed for muscle spasms.    topiramate (TOPAMAX) 100 mg, Oral, Daily    venlafaxine XR (EFFEXOR-XR) 37.5 MG 24 hr capsule Take 1 capsule by mouth Daily with food    zolpidem (AMBIEN) 10 mg, Oral, Nightly PRN    zolpidem (AMBIEN) 10 mg, Oral, Nightly PRN       Allergies   Allergen Reactions    Sulfa Antibiotics Rash    Wound Dressing Adhesive Rash       Social History     Socioeconomic History    Marital status:    Tobacco Use    Smoking status:  "Former     Current packs/day: 0.00     Average packs/day: 0.9 packs/day for 33.3 years (30.0 ttl pk-yrs)     Types: Cigarettes     Quit date: 2024     Years since quittin.0    Smokeless tobacco: Never    Tobacco comments:     Don’t smoke cigarettes but have started using vape   Vaping Use    Vaping status: Every Day    Substances: Nicotine, Flavoring   Substance and Sexual Activity    Alcohol use: Yes     Alcohol/week: 2.0 standard drinks of alcohol     Types: 2 Glasses of wine per week    Drug use: Never    Sexual activity: Yes     Partners: Male     Birth control/protection: Tubal ligation       Family History   Problem Relation Age of Onset    Seizures Mother     Migraines Mother     Cancer Mother         Pre cancerous lesions in the colon    Hypertension Father     Diabetes Father     Breast cancer Paternal Grandmother     Cancer Paternal Grandmother         Breast and Colon Cancer    Cancer Maternal Grandmother         Bladder    Cancer Paternal Grandfather         Lung    Cancer Paternal Uncle         Bladder       Objective    Objective     Vitals:   Visit Vitals  /84   Ht 157.5 cm (62\")   Wt 68 kg (150 lb)   BMI 27.44 kg/m²        Physical Exam        Result Review    CT Abdomen Pelvis With Contrast (2024 14:17)  Stable 3.2 cm septated right ovarian cyst.   Fibroid uterus     US Non-ob Transvaginal (2025 08:24)  The uterus measures 9.7 cm in sagittal dimension by 6.2 cm in anterior  to posterior dimension by 5.9 cm in transverse dimension. The uterus is  diffusely heterogeneous and nodular in appearance with multiple  fibroids. This includes a posterior fundal fibroid measuring 4.3 x 2.9 x  3.8 cm in size and an anterior fundal fibroid measuring 3.2 x 2.9 x 3.3  cm in size.     There is a complex/septated nabothian cyst within the cervix measuring  1.2 x 0.9 x 0.7 cm in size.     Endometrial stripe is within normal limits with a double layer thickness  of 7 mm.     No free fluid in " the cul-de-sac.     The right ovary measures 3.5 x 1.9 x 2.8 cm in size. There is an  associated complex lesion with what appears to be a calcified rim  measuring 2.5 x 1.9 x 2.2 cm in size. There is normal color flow to the  right ovary.     The left ovary measures 3.8 x 2.1 x 1.8 cm in size and is normal in  sonographic appearance with normal color flow.     Postmenopausal Interp: LOW (02/14/2025 08:41)  Premenopausal Interp: LOW (02/14/2025 08:41)  Ovarian Malignancy Risk-FLORINDA (02/14/2025 08:41)  Low risk,  = 7.6    PAP SMEAR SCANNED (10/22/2024)   NILM    Tissue Pathology Exam (02/14/2025 13:54)   Endometrial Biopsy:  - Thin Strips of [place] Glandular Mucosa and Mucus, Consistent With endocervical Tissue.  - Endometrial Tissue Not Identified.        Assessment & Plan   Assessment / Plan     Diagnoses and all orders for this visit:    1. Intramural leiomyoma of uterus (Primary)    2. Complex cyst of right ovary    3. History of tubal ligation    4. Pelvic pressure in female    5. History of endometrial ablation    6. Hematometra    7. Female dyspareunia    8. Perimenopausal    9. Atypical ductal hyperplasia of breast    10. Family history of breast cancer          Discussion:   Complex ovarian cyst, bulky uterus and fibroids. H/o endometrial ablation and now with hematometra. She desires definitive management in the form of hysterectomy and oophorectomy. She understands the risks involved including surgical menopause with removal of ovaries. Hysterectomy and oophorectomy discussed. Robot-assisted TLH/BSO/cystoscopy reviewed. Route, surgery, expectations, postoperative recovery and limitations reviewed. Return to work discussed.     Surgical Counseling  The patient was informed of the risks and benefits of the planned procedures. The risks included but were not limited to: bleeding, infection, injury to surrounding structures potentially including the vascular, gastrointestinal, and genitourinary systems,  nerve injury, possible blood transfusion and its associated risks. Patient was counseled on the possibility of a laparotomy, and all other indicated procedures. We discussed the possibility of difficulties with anesthesia, potential exacerbations to other health conditions, and potential concern for chronic pain that follows any surgery. Patient expressed understanding of the risks involved. Her questions were answered to her satisfaction. No guarantees were made or implied. The patient consented to proceed with the planned procedures.    Plan for Robot-assisted TLH/BSO/cystoscopy on 3/24/2025.    Follow-up: Return in about 5 weeks (around 4/10/2025) for Post-op.    Bill To MD

## 2025-03-04 NOTE — H&P (VIEW-ONLY)
"    Bill To MD  [facility]  [address]  [place] [address]  Office [phone]  Fax [phone]      [facility]  Gloria Melgoza  : 1980  MRN: [id number]    Subjective   Subjective     Chief Complaint   Patient presents with    Pre-op Exam     Pt here for pre-op visit. Scheduled for TLH BSO on 3/24.       History of Present Illness  Gloria Melgoza is a 44 y.o. female , , who comes to the office today for pre-op visit. Scheduled for robot-TLH/BSO on 3/24. Reports ready for surgery. Pelvic pressure has worsened since last appointment. Last visit \"Diagnosed with fibroids and ovarian cyst incidentally.  No bleeding since ablation/tubal in 2018. Cramping recently past few months. Different from her diverticulosis. More so having pelvic pressure. Voiding - otherwise normal. May notice symptoms of urge right after voids. Constipation - denies changes. Has been working on medications for this prior to recently. Some vaginal discharge. Mucous. Pain with intercourse - more after intercourse. Perimenopause symptoms - has noted mood changes and hot flushes for past year.\"      Review of Systems   Genitourinary:  Positive for dyspareunia, pelvic pain and vaginal discharge. Negative for decreased urine volume, difficulty urinating, dysuria, enuresis, flank pain, frequency, genital sores, hematuria, menstrual problem, urgency, vaginal bleeding and vaginal pain.   All other systems reviewed and are negative.       Personal History     The following portions of the patient's history were reviewed and updated as appropriate: allergies, current medications, past family history, past medical history, past social history, past surgical history, and problem list.    OB hx:                    OB History    Para Term  AB Living   4 2 2   2     SAB IAB Ectopic Molar Multiple Live Births    2         2       # Outcome Date GA Lbr Jarod/2nd Weight Sex Type Anes PTL Lv   4 SAB          "            3 SAB                     2 Term                     1 Term                           GYN hx:  Date of LMP: No LMP recorded. Patient has had an ablation.  Age at menarche: 9     Menopause: hot flushes/symptoms 2024  Menses: none   Flow: none  Date/Result of last Pap smear: she reports her last PAP was normal   History of abnormal PAP: Yes. Details: 20s cryotyerapy  History of Sexually Transmitted Infection: No  Current Birth Control Method: tubal sterilization  History of Contraception: Yes. Details: IUD, depo, pills  HRT: Yes. Details: trailed for 6 months  Sexually active: Yes. Details: male partner   FMH of Breast, Uterine, Ovarian, or Colon cancer: Yes. Details: breast & colon cancer  Additional OB/GYN History (not otherwise listed):  -h/o c/s x2  -h/o endometrial ablation and tubal for menses       Past Medical History:   Diagnosis Date    Abnormal Pap smear of cervix In my 20’s    Severe dysplasia    Anxiety     Asthma     Increasing shortness of breath in revent months    Cervical dysplasia     In my 20’s, had it frozen off in doctor’s office    Dental disease     Almost all top teeth are crowns    Depression     Dizziness     Always attributed to blood pressure    Fibroid     GERD (gastroesophageal reflux disease)     Headache     Hyperlipidemia     Hypertension     Insomnia     Migraine     Muscle spasm of right shoulder     Nosebleed     Ovarian cyst     On recent CT    PMS (premenstrual syndrome)     Recurrent pregnancy loss, antepartum condition or complication     One right before each live pregnancy/birth    Sinusitis     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    Urinary tract infection     Varicella     As a young child       Past Surgical History:   Procedure Laterality Date    BREAST BIOPSY Right     benign    BREAST LUMPECTOMY WITH SENTINEL NODE BIOPSY Left 10/18/2024    Procedure: LEFT BREAST LUMPECTOMY WITH MAG SEED GUIDANCE x 2;  Surgeon: Delfina Baker MD;   Location:  [facility] OR;  Service: General;  Laterality: Left;    CARPAL TUNNEL RELEASE Bilateral      SECTION Bilateral     x2    COLONOSCOPY      ENDOMETRIAL ABLATION Bilateral 2018    tubal ligtion at same time    ENDOSCOPIC FUNCTIONAL SINUS SURGERY (FESS) Bilateral 2024    Procedure: Bilateral functional endoscopic anterior ethmoidectomy with bilateral middle meatal antrostomy, Septoplasty, and Bilateral inferior turbinate reduction via Coblation;  Surgeon: Jadiel Smith MD;  Location:  [facility] OR;  Service: ENT;  Laterality: Bilateral;    LAPAROSCOPIC TUBAL LIGATION      SINUS SURGERY  24    TUBAL ABDOMINAL LIGATION  2018    WISDOM TOOTH EXTRACTION      In my 20’s       Current Outpatient Medications   Medication Instructions    acetaminophen (TYLENOL) 975 mg, Oral, Every 8 Hours, Take every 8 hours for 3 days then take prn as needed.    Azelastine HCl 137 MCG/SPRAY solution Use 1 spray into the nostril(s) as directed by provider 2 (Two) Times a Day.    fluticasone (FLONASE) 50 MCG/ACT nasal spray 1 spray, Nasal, Daily    ibuprofen (ADVIL,MOTRIN) 800 mg, Every 8 Hours PRN    multivitamin with minerals tablet tablet 1 tablet, Daily    ondansetron (ZOFRAN) 4 mg, Oral, Every 8 Hours PRN    propranolol LA (INDERAL LA) 120 mg, Oral, Daily    rOPINIRole (REQUIP) 1 mg, Oral, Every Night at Bedtime, Take 1-3 hour before bedtime.    rosuvastatin (CRESTOR) 10 mg, Oral, Nightly    tiZANidine (ZANAFLEX) 4 MG tablet Take 1 tablet by mouth at bedtime as needed for muscle spasms.    topiramate (TOPAMAX) 100 mg, Oral, Daily    venlafaxine XR (EFFEXOR-XR) 37.5 MG 24 hr capsule Take 1 capsule by mouth Daily with food    zolpidem (AMBIEN) 10 mg, Oral, Nightly PRN    zolpidem (AMBIEN) 10 mg, Oral, Nightly PRN       Allergies   Allergen Reactions    Sulfa Antibiotics Rash    Wound Dressing Adhesive Rash       Social History     Socioeconomic History    Marital status:    Tobacco Use    Smoking status:  "Former     Current packs/day: 0.00     Average packs/day: 0.9 packs/day for 33.3 years (30.0 ttl pk-yrs)     Types: Cigarettes     Quit date: 2024     Years since quittin.0    Smokeless tobacco: Never    Tobacco comments:     Don’t smoke cigarettes but have started using vape   Vaping Use    Vaping status: Every Day    Substances: Nicotine, Flavoring   Substance and Sexual Activity    Alcohol use: Yes     Alcohol/week: 2.0 standard drinks of alcohol     Types: 2 Glasses of wine per week    Drug use: Never    Sexual activity: Yes     Partners: Male     Birth control/protection: Tubal ligation       Family History   Problem Relation Age of Onset    Seizures Mother     Migraines Mother     Cancer Mother         Pre cancerous lesions in the colon    Hypertension Father     Diabetes Father     Breast cancer Paternal Grandmother     Cancer Paternal Grandmother         Breast and Colon Cancer    Cancer Maternal Grandmother         Bladder    Cancer Paternal Grandfather         Lung    Cancer Paternal Uncle         Bladder       Objective    Objective     Vitals:   Visit Vitals  /84   Ht 157.5 cm (62\")   Wt 68 kg (150 lb)   BMI 27.44 kg/m²        Physical Exam        Result Review    CT Abdomen Pelvis With Contrast (2024 14:17)  Stable 3.2 cm septated right ovarian cyst.   Fibroid uterus     US Non-ob Transvaginal (2025 08:24)  The uterus measures 9.7 cm in sagittal dimension by 6.2 cm in anterior  to posterior dimension by 5.9 cm in transverse dimension. The uterus is  diffusely heterogeneous and nodular in appearance with multiple  fibroids. This includes a posterior fundal fibroid measuring 4.3 x 2.9 x  3.8 cm in size and an anterior fundal fibroid measuring 3.2 x 2.9 x 3.3  cm in size.     There is a complex/septated nabothian cyst within the cervix measuring  1.2 x 0.9 x 0.7 cm in size.     Endometrial stripe is within normal limits with a double layer thickness  of 7 mm.     No free fluid in " the cul-de-sac.     The right ovary measures 3.5 x 1.9 x 2.8 cm in size. There is an  associated complex lesion with what appears to be a calcified rim  measuring 2.5 x 1.9 x 2.2 cm in size. There is normal color flow to the  right ovary.     The left ovary measures 3.8 x 2.1 x 1.8 cm in size and is normal in  sonographic appearance with normal color flow.     Postmenopausal Interp: LOW (02/14/2025 08:41)  Premenopausal Interp: LOW (02/14/2025 08:41)  Ovarian Malignancy Risk-FLORINDA (02/14/2025 08:41)  Low risk,  = 7.6    PAP SMEAR SCANNED (10/22/2024)   NILM    Tissue Pathology Exam (02/14/2025 13:54)   Endometrial Biopsy:  - Thin Strips of [place] Glandular Mucosa and Mucus, Consistent With endocervical Tissue.  - Endometrial Tissue Not Identified.        Assessment & Plan   Assessment / Plan     Diagnoses and all orders for this visit:    1. Intramural leiomyoma of uterus (Primary)    2. Complex cyst of right ovary    3. History of tubal ligation    4. Pelvic pressure in female    5. History of endometrial ablation    6. Hematometra    7. Female dyspareunia    8. Perimenopausal    9. Atypical ductal hyperplasia of breast    10. Family history of breast cancer          Discussion:   Complex ovarian cyst, bulky uterus and fibroids. H/o endometrial ablation and now with hematometra. She desires definitive management in the form of hysterectomy and oophorectomy. She understands the risks involved including surgical menopause with removal of ovaries. Hysterectomy and oophorectomy discussed. Robot-assisted TLH/BSO/cystoscopy reviewed. Route, surgery, expectations, postoperative recovery and limitations reviewed. Return to work discussed.     Surgical Counseling  The patient was informed of the risks and benefits of the planned procedures. The risks included but were not limited to: bleeding, infection, injury to surrounding structures potentially including the vascular, gastrointestinal, and genitourinary systems,  nerve injury, possible blood transfusion and its associated risks. Patient was counseled on the possibility of a laparotomy, and all other indicated procedures. We discussed the possibility of difficulties with anesthesia, potential exacerbations to other health conditions, and potential concern for chronic pain that follows any surgery. Patient expressed understanding of the risks involved. Her questions were answered to her satisfaction. No guarantees were made or implied. The patient consented to proceed with the planned procedures.    Plan for Robot-assisted TLH/BSO/cystoscopy on 3/24/2025.    Follow-up: Return in about 5 weeks (around 4/10/2025) for Post-op.    Bill To MD

## 2025-03-24 ENCOUNTER — HOSPITAL ENCOUNTER (OUTPATIENT)
Facility: HOSPITAL | Age: 45
Setting detail: HOSPITAL OUTPATIENT SURGERY
Discharge: HOME | End: 2025-03-24
Attending: OBSTETRICS & GYNECOLOGY | Admitting: OBSTETRICS & GYNECOLOGY
Payer: COMMERCIAL

## 2025-03-24 ENCOUNTER — ANESTHESIA EVENT (OUTPATIENT)
Dept: PERIOP | Facility: HOSPITAL | Age: 45
End: 2025-03-24
Payer: COMMERCIAL

## 2025-03-24 ENCOUNTER — ANESTHESIA (OUTPATIENT)
Dept: PERIOP | Facility: HOSPITAL | Age: 45
End: 2025-03-24
Payer: COMMERCIAL

## 2025-03-24 VITALS
SYSTOLIC BLOOD PRESSURE: 111 MMHG | DIASTOLIC BLOOD PRESSURE: 74 MMHG | HEART RATE: 73 BPM | OXYGEN SATURATION: 98 % | TEMPERATURE: 97 F | RESPIRATION RATE: 16 BRPM

## 2025-03-24 DIAGNOSIS — Z80.3 FAMILY HISTORY OF BREAST CANCER: ICD-10-CM

## 2025-03-24 DIAGNOSIS — N83.291 COMPLEX CYST OF RIGHT OVARY: ICD-10-CM

## 2025-03-24 DIAGNOSIS — N60.99 ATYPICAL DUCTAL HYPERPLASIA OF BREAST: ICD-10-CM

## 2025-03-24 DIAGNOSIS — D25.1 INTRAMURAL LEIOMYOMA OF UTERUS: ICD-10-CM

## 2025-03-24 DIAGNOSIS — N94.10 FEMALE DYSPAREUNIA: ICD-10-CM

## 2025-03-24 DIAGNOSIS — N94.6 DYSMENORRHEA: Primary | ICD-10-CM

## 2025-03-24 DIAGNOSIS — R10.2 PELVIC PRESSURE IN FEMALE: ICD-10-CM

## 2025-03-24 DIAGNOSIS — Z98.890 HISTORY OF ENDOMETRIAL ABLATION: ICD-10-CM

## 2025-03-24 DIAGNOSIS — Z98.51 HISTORY OF TUBAL LIGATION: ICD-10-CM

## 2025-03-24 LAB
ABO GROUP BLD: NORMAL
B-HCG UR QL: NEGATIVE
BLD GP AB SCN SERPL QL: NEGATIVE
RH BLD: POSITIVE
T&S EXPIRATION DATE: NORMAL

## 2025-03-24 PROCEDURE — 25010000002 ONDANSETRON PER 1 MG: Performed by: NURSE ANESTHETIST, CERTIFIED REGISTERED

## 2025-03-24 PROCEDURE — 25010000002 CEFAZOLIN PER 500 MG: Performed by: OBSTETRICS & GYNECOLOGY

## 2025-03-24 PROCEDURE — 25010000002 FENTANYL CITRATE (PF) 100 MCG/2ML SOLUTION: Performed by: NURSE ANESTHETIST, CERTIFIED REGISTERED

## 2025-03-24 PROCEDURE — 88307 TISSUE EXAM BY PATHOLOGIST: CPT | Performed by: OBSTETRICS & GYNECOLOGY

## 2025-03-24 PROCEDURE — 25810000003 LACTATED RINGERS PER 1000 ML: Performed by: OBSTETRICS & GYNECOLOGY

## 2025-03-24 PROCEDURE — 25010000002 LIDOCAINE PF 2% 2 % SOLUTION: Performed by: NURSE ANESTHETIST, CERTIFIED REGISTERED

## 2025-03-24 PROCEDURE — 25010000002 SUGAMMADEX 200 MG/2ML SOLUTION: Performed by: NURSE ANESTHETIST, CERTIFIED REGISTERED

## 2025-03-24 PROCEDURE — 25010000002 DEXAMETHASONE PER 1 MG: Performed by: NURSE ANESTHETIST, CERTIFIED REGISTERED

## 2025-03-24 PROCEDURE — 81025 URINE PREGNANCY TEST: CPT | Performed by: OBSTETRICS & GYNECOLOGY

## 2025-03-24 PROCEDURE — 86900 BLOOD TYPING SEROLOGIC ABO: CPT | Performed by: OBSTETRICS & GYNECOLOGY

## 2025-03-24 PROCEDURE — 25010000002 PROPOFOL 10 MG/ML EMULSION: Performed by: NURSE ANESTHETIST, CERTIFIED REGISTERED

## 2025-03-24 PROCEDURE — 25010000002 METRONIDAZOLE 500 MG/100ML SOLUTION: Performed by: OBSTETRICS & GYNECOLOGY

## 2025-03-24 PROCEDURE — C1713 ANCHOR/SCREW BN/BN,TIS/BN: HCPCS | Performed by: OBSTETRICS & GYNECOLOGY

## 2025-03-24 PROCEDURE — 25010000002 KETOROLAC TROMETHAMINE PER 15 MG: Performed by: NURSE ANESTHETIST, CERTIFIED REGISTERED

## 2025-03-24 PROCEDURE — 86901 BLOOD TYPING SEROLOGIC RH(D): CPT | Performed by: OBSTETRICS & GYNECOLOGY

## 2025-03-24 PROCEDURE — 58571 TLH W/T/O 250 G OR LESS: CPT | Performed by: OBSTETRICS & GYNECOLOGY

## 2025-03-24 PROCEDURE — 25010000002 MIDAZOLAM PER 1MG: Performed by: ANESTHESIOLOGY

## 2025-03-24 PROCEDURE — 86850 RBC ANTIBODY SCREEN: CPT | Performed by: OBSTETRICS & GYNECOLOGY

## 2025-03-24 DEVICE — ABSORBABLE WOUND CLOSURE DEVICE
Type: IMPLANTABLE DEVICE | Site: ABDOMEN | Status: FUNCTIONAL
Brand: V-LOC 180

## 2025-03-24 RX ORDER — LABETALOL HYDROCHLORIDE 5 MG/ML
5 INJECTION, SOLUTION INTRAVENOUS
Status: DISCONTINUED | OUTPATIENT
Start: 2025-03-24 | End: 2025-03-24 | Stop reason: HOSPADM

## 2025-03-24 RX ORDER — ROCURONIUM BROMIDE 10 MG/ML
INJECTION, SOLUTION INTRAVENOUS AS NEEDED
Status: DISCONTINUED | OUTPATIENT
Start: 2025-03-24 | End: 2025-03-24 | Stop reason: SURG

## 2025-03-24 RX ORDER — ACETAMINOPHEN 500 MG
1000 TABLET ORAL ONCE
Status: COMPLETED | OUTPATIENT
Start: 2025-03-24 | End: 2025-03-24

## 2025-03-24 RX ORDER — DOCUSATE SODIUM 100 MG/1
100 CAPSULE, LIQUID FILLED ORAL 2 TIMES DAILY
Qty: 60 CAPSULE | Refills: 1 | Status: SHIPPED | OUTPATIENT
Start: 2025-03-24 | End: 2025-03-31

## 2025-03-24 RX ORDER — SODIUM CHLORIDE 0.9 % (FLUSH) 0.9 %
3-10 SYRINGE (ML) INJECTION AS NEEDED
Status: DISCONTINUED | OUTPATIENT
Start: 2025-03-24 | End: 2025-03-24 | Stop reason: HOSPADM

## 2025-03-24 RX ORDER — ACETAMINOPHEN 325 MG/1
650 TABLET ORAL EVERY 6 HOURS PRN
Qty: 60 TABLET | Refills: 0 | Status: SHIPPED | OUTPATIENT
Start: 2025-03-24

## 2025-03-24 RX ORDER — IBUPROFEN 200 MG
600 TABLET ORAL EVERY 6 HOURS PRN
Qty: 40 TABLET | Refills: 0 | Status: SHIPPED | OUTPATIENT
Start: 2025-03-24 | End: 2025-03-24 | Stop reason: HOSPADM

## 2025-03-24 RX ORDER — TRAMADOL HYDROCHLORIDE 50 MG/1
50 TABLET ORAL EVERY 8 HOURS PRN
Qty: 9 TABLET | Refills: 0 | Status: SHIPPED | OUTPATIENT
Start: 2025-03-24 | End: 2025-03-27

## 2025-03-24 RX ORDER — ONDANSETRON 2 MG/ML
4 INJECTION INTRAMUSCULAR; INTRAVENOUS ONCE AS NEEDED
Status: DISCONTINUED | OUTPATIENT
Start: 2025-03-24 | End: 2025-03-24 | Stop reason: HOSPADM

## 2025-03-24 RX ORDER — SCOPOLAMINE 1 MG/3D
1 PATCH, EXTENDED RELEASE TRANSDERMAL ONCE
Status: DISCONTINUED | OUTPATIENT
Start: 2025-03-24 | End: 2025-03-24 | Stop reason: HOSPADM

## 2025-03-24 RX ORDER — MIDAZOLAM HYDROCHLORIDE 2 MG/2ML
1 INJECTION, SOLUTION INTRAMUSCULAR; INTRAVENOUS
Status: DISCONTINUED | OUTPATIENT
Start: 2025-03-24 | End: 2025-03-24 | Stop reason: HOSPADM

## 2025-03-24 RX ORDER — SODIUM CHLORIDE, SODIUM LACTATE, POTASSIUM CHLORIDE, CALCIUM CHLORIDE 600; 310; 30; 20 MG/100ML; MG/100ML; MG/100ML; MG/100ML
1000 INJECTION, SOLUTION INTRAVENOUS CONTINUOUS
Status: DISCONTINUED | OUTPATIENT
Start: 2025-03-24 | End: 2025-03-24 | Stop reason: HOSPADM

## 2025-03-24 RX ORDER — SODIUM CHLORIDE, SODIUM LACTATE, POTASSIUM CHLORIDE, CALCIUM CHLORIDE 600; 310; 30; 20 MG/100ML; MG/100ML; MG/100ML; MG/100ML
100 INJECTION, SOLUTION INTRAVENOUS CONTINUOUS
Status: DISCONTINUED | OUTPATIENT
Start: 2025-03-24 | End: 2025-03-24 | Stop reason: HOSPADM

## 2025-03-24 RX ORDER — SODIUM CHLORIDE 0.9 % (FLUSH) 0.9 %
10 SYRINGE (ML) INJECTION AS NEEDED
Status: DISCONTINUED | OUTPATIENT
Start: 2025-03-24 | End: 2025-03-24 | Stop reason: HOSPADM

## 2025-03-24 RX ORDER — ACETAMINOPHEN 325 MG/1
650 TABLET ORAL EVERY 6 HOURS PRN
Qty: 60 TABLET | Refills: 0
Start: 2025-03-24 | End: 2025-03-24 | Stop reason: HOSPADM

## 2025-03-24 RX ORDER — FENTANYL CITRATE 50 UG/ML
50 INJECTION, SOLUTION INTRAMUSCULAR; INTRAVENOUS
Status: DISCONTINUED | OUTPATIENT
Start: 2025-03-24 | End: 2025-03-24 | Stop reason: HOSPADM

## 2025-03-24 RX ORDER — SODIUM CHLORIDE 9 MG/ML
40 INJECTION, SOLUTION INTRAVENOUS AS NEEDED
Status: DISCONTINUED | OUTPATIENT
Start: 2025-03-24 | End: 2025-03-24 | Stop reason: HOSPADM

## 2025-03-24 RX ORDER — METRONIDAZOLE 500 MG/100ML
500 INJECTION, SOLUTION INTRAVENOUS ONCE
Status: COMPLETED | OUTPATIENT
Start: 2025-03-24 | End: 2025-03-24

## 2025-03-24 RX ORDER — HYDROCODONE BITARTRATE AND ACETAMINOPHEN 10; 325 MG/1; MG/1
1 TABLET ORAL EVERY 4 HOURS PRN
Status: DISCONTINUED | OUTPATIENT
Start: 2025-03-24 | End: 2025-03-24 | Stop reason: HOSPADM

## 2025-03-24 RX ORDER — IBUPROFEN 600 MG/1
600 TABLET, FILM COATED ORAL EVERY 6 HOURS PRN
Qty: 40 TABLET | Refills: 1 | Status: SHIPPED | OUTPATIENT
Start: 2025-03-24

## 2025-03-24 RX ORDER — SODIUM CHLORIDE 0.9 % (FLUSH) 0.9 %
3 SYRINGE (ML) INJECTION EVERY 12 HOURS SCHEDULED
Status: DISCONTINUED | OUTPATIENT
Start: 2025-03-24 | End: 2025-03-24 | Stop reason: HOSPADM

## 2025-03-24 RX ORDER — SODIUM CHLORIDE 0.9 % (FLUSH) 0.9 %
10 SYRINGE (ML) INJECTION EVERY 12 HOURS SCHEDULED
Status: DISCONTINUED | OUTPATIENT
Start: 2025-03-24 | End: 2025-03-24 | Stop reason: HOSPADM

## 2025-03-24 RX ORDER — SODIUM CHLORIDE 0.9 % (FLUSH) 0.9 %
1-10 SYRINGE (ML) INJECTION AS NEEDED
Status: DISCONTINUED | OUTPATIENT
Start: 2025-03-24 | End: 2025-03-24 | Stop reason: HOSPADM

## 2025-03-24 RX ORDER — HYDROCODONE BITARTRATE AND ACETAMINOPHEN 5; 325 MG/1; MG/1
1 TABLET ORAL EVERY 4 HOURS PRN
Status: DISCONTINUED | OUTPATIENT
Start: 2025-03-24 | End: 2025-03-24 | Stop reason: HOSPADM

## 2025-03-24 RX ORDER — ONDANSETRON 2 MG/ML
INJECTION INTRAMUSCULAR; INTRAVENOUS AS NEEDED
Status: DISCONTINUED | OUTPATIENT
Start: 2025-03-24 | End: 2025-03-24 | Stop reason: SURG

## 2025-03-24 RX ORDER — LIDOCAINE HYDROCHLORIDE 10 MG/ML
0.5 INJECTION, SOLUTION EPIDURAL; INFILTRATION; INTRACAUDAL; PERINEURAL ONCE AS NEEDED
Status: DISCONTINUED | OUTPATIENT
Start: 2025-03-24 | End: 2025-03-24 | Stop reason: HOSPADM

## 2025-03-24 RX ORDER — FENTANYL CITRATE 50 UG/ML
INJECTION, SOLUTION INTRAMUSCULAR; INTRAVENOUS AS NEEDED
Status: DISCONTINUED | OUTPATIENT
Start: 2025-03-24 | End: 2025-03-24 | Stop reason: SURG

## 2025-03-24 RX ORDER — IBUPROFEN 600 MG/1
600 TABLET, FILM COATED ORAL EVERY 6 HOURS PRN
Status: DISCONTINUED | OUTPATIENT
Start: 2025-03-24 | End: 2025-03-24 | Stop reason: HOSPADM

## 2025-03-24 RX ORDER — KETOROLAC TROMETHAMINE 30 MG/ML
INJECTION, SOLUTION INTRAMUSCULAR; INTRAVENOUS AS NEEDED
Status: DISCONTINUED | OUTPATIENT
Start: 2025-03-24 | End: 2025-03-24 | Stop reason: SURG

## 2025-03-24 RX ORDER — LIDOCAINE HYDROCHLORIDE 20 MG/ML
INJECTION, SOLUTION EPIDURAL; INFILTRATION; INTRACAUDAL; PERINEURAL AS NEEDED
Status: DISCONTINUED | OUTPATIENT
Start: 2025-03-24 | End: 2025-03-24 | Stop reason: SURG

## 2025-03-24 RX ORDER — PHENYLEPHRINE HCL IN 0.9% NACL 1 MG/10 ML
SYRINGE (ML) INTRAVENOUS AS NEEDED
Status: DISCONTINUED | OUTPATIENT
Start: 2025-03-24 | End: 2025-03-24 | Stop reason: SURG

## 2025-03-24 RX ORDER — SODIUM CHLORIDE 0.9 % (FLUSH) 0.9 %
3 SYRINGE (ML) INJECTION AS NEEDED
Status: DISCONTINUED | OUTPATIENT
Start: 2025-03-24 | End: 2025-03-24 | Stop reason: HOSPADM

## 2025-03-24 RX ORDER — PROPOFOL 10 MG/ML
VIAL (ML) INTRAVENOUS AS NEEDED
Status: DISCONTINUED | OUTPATIENT
Start: 2025-03-24 | End: 2025-03-24 | Stop reason: SURG

## 2025-03-24 RX ORDER — FLUMAZENIL 0.1 MG/ML
0.2 INJECTION INTRAVENOUS AS NEEDED
Status: DISCONTINUED | OUTPATIENT
Start: 2025-03-24 | End: 2025-03-24 | Stop reason: HOSPADM

## 2025-03-24 RX ORDER — SODIUM CHLORIDE 9 MG/ML
100 INJECTION, SOLUTION INTRAVENOUS CONTINUOUS
Status: DISCONTINUED | OUTPATIENT
Start: 2025-03-24 | End: 2025-03-24 | Stop reason: HOSPADM

## 2025-03-24 RX ORDER — MAGNESIUM HYDROXIDE 1200 MG/15ML
LIQUID ORAL AS NEEDED
Status: DISCONTINUED | OUTPATIENT
Start: 2025-03-24 | End: 2025-03-24 | Stop reason: HOSPADM

## 2025-03-24 RX ORDER — NALOXONE HCL 0.4 MG/ML
0.4 VIAL (ML) INJECTION AS NEEDED
Status: DISCONTINUED | OUTPATIENT
Start: 2025-03-24 | End: 2025-03-24 | Stop reason: HOSPADM

## 2025-03-24 RX ORDER — EPHEDRINE SULFATE 50 MG/ML
INJECTION INTRAVENOUS AS NEEDED
Status: DISCONTINUED | OUTPATIENT
Start: 2025-03-24 | End: 2025-03-24 | Stop reason: SURG

## 2025-03-24 RX ORDER — BUPIVACAINE HYDROCHLORIDE AND EPINEPHRINE 5; 5 MG/ML; UG/ML
INJECTION, SOLUTION PERINEURAL AS NEEDED
Status: DISCONTINUED | OUTPATIENT
Start: 2025-03-24 | End: 2025-03-24 | Stop reason: HOSPADM

## 2025-03-24 RX ORDER — DEXAMETHASONE SODIUM PHOSPHATE 4 MG/ML
INJECTION, SOLUTION INTRA-ARTICULAR; INTRALESIONAL; INTRAMUSCULAR; INTRAVENOUS; SOFT TISSUE AS NEEDED
Status: DISCONTINUED | OUTPATIENT
Start: 2025-03-24 | End: 2025-03-24 | Stop reason: SURG

## 2025-03-24 RX ORDER — ONDANSETRON 4 MG/1
4 TABLET, ORALLY DISINTEGRATING ORAL EVERY 8 HOURS PRN
Qty: 21 TABLET | Refills: 0 | Status: SHIPPED | OUTPATIENT
Start: 2025-03-24

## 2025-03-24 RX ADMIN — FENTANYL CITRATE 100 MCG: 50 INJECTION, SOLUTION INTRAMUSCULAR; INTRAVENOUS at 06:58

## 2025-03-24 RX ADMIN — PROPOFOL 150 MG: 10 INJECTION, EMULSION INTRAVENOUS at 07:02

## 2025-03-24 RX ADMIN — LIDOCAINE HYDROCHLORIDE 100 MG: 20 INJECTION, SOLUTION EPIDURAL; INFILTRATION; INTRACAUDAL; PERINEURAL at 07:02

## 2025-03-24 RX ADMIN — ACETAMINOPHEN TAB 500 MG 1000 MG: 500 TAB at 06:34

## 2025-03-24 RX ADMIN — METRONIDAZOLE 500 MG: 5 INJECTION, SOLUTION INTRAVENOUS at 06:34

## 2025-03-24 RX ADMIN — SCOPOLAMINE 1 PATCH: 1.5 PATCH, EXTENDED RELEASE TRANSDERMAL at 06:34

## 2025-03-24 RX ADMIN — SODIUM CHLORIDE, POTASSIUM CHLORIDE, SODIUM LACTATE AND CALCIUM CHLORIDE 1000 ML: 600; 310; 30; 20 INJECTION, SOLUTION INTRAVENOUS at 06:12

## 2025-03-24 RX ADMIN — DEXAMETHASONE SODIUM PHOSPHATE 8 MG: 4 INJECTION, SOLUTION INTRA-ARTICULAR; INTRALESIONAL; INTRAMUSCULAR; INTRAVENOUS; SOFT TISSUE at 08:17

## 2025-03-24 RX ADMIN — MIDAZOLAM HYDROCHLORIDE 1 MG: 1 INJECTION, SOLUTION INTRAMUSCULAR; INTRAVENOUS at 06:47

## 2025-03-24 RX ADMIN — CEFAZOLIN 2000 MG: 2 INJECTION, POWDER, FOR SOLUTION INTRAMUSCULAR; INTRAVENOUS at 07:04

## 2025-03-24 RX ADMIN — KETOROLAC TROMETHAMINE 30 MG: 30 INJECTION, SOLUTION INTRAMUSCULAR; INTRAVENOUS at 08:20

## 2025-03-24 RX ADMIN — ROCURONIUM BROMIDE 50 MG: 10 INJECTION, SOLUTION INTRAVENOUS at 07:02

## 2025-03-24 RX ADMIN — ONDANSETRON 4 MG: 2 INJECTION INTRAMUSCULAR; INTRAVENOUS at 08:20

## 2025-03-24 RX ADMIN — Medication 150 MCG: at 07:19

## 2025-03-24 RX ADMIN — HYDROCODONE BITARTRATE AND ACETAMINOPHEN 1 TABLET: 5; 325 TABLET ORAL at 08:47

## 2025-03-24 RX ADMIN — ROCURONIUM BROMIDE 20 MG: 10 INJECTION, SOLUTION INTRAVENOUS at 08:02

## 2025-03-24 RX ADMIN — SUGAMMADEX 200 MG: 100 INJECTION, SOLUTION INTRAVENOUS at 08:23

## 2025-03-24 RX ADMIN — IBUPROFEN 600 MG: 600 TABLET, FILM COATED ORAL at 09:22

## 2025-03-24 RX ADMIN — ROCURONIUM BROMIDE 30 MG: 10 INJECTION, SOLUTION INTRAVENOUS at 07:48

## 2025-03-24 RX ADMIN — EPHEDRINE SULFATE 10 MG: 50 INJECTION INTRAVENOUS at 07:22

## 2025-03-24 NOTE — ANESTHESIA POSTPROCEDURE EVALUATION
Patient: Gloria Melgoza    Procedure Summary       Date: 03/24/25 Room / Location: [facility] OR [address] [place] [address] [facility]    Anesthesia Start: 0658 Anesthesia Stop: 0840    Procedure: TOTAL LAPAROSCOPIC HYSTERECTOMY BILATERAL SALPINGOOPHORECTOMY WITH DAVINCI ROBOT, CYSTO (Bilateral) Diagnosis:       Intramural leiomyoma of uterus      Complex cyst of right ovary      History of tubal ligation      History of endometrial ablation      Pelvic pressure in female      Family history of breast cancer      Atypical ductal hyperplasia of breast      Female dyspareunia      (Intramural leiomyoma of uterus [D25.1])      (Complex cyst of right ovary [N83.291])      (History of tubal ligation [Z98.51])      (History of endometrial ablation [Z98.890])      (Pelvic pressure in female [R10.2])      (Family history of breast cancer [Z80.3])      (Atypical ductal hyperplasia of breast [N60.99])      (Female dyspareunia [N94.10])    Surgeons: Bill To MD Provider: Najma John CRNA    Anesthesia Type: general ASA Status: 2            Anesthesia Type: general    Vitals  Vitals Value Taken Time   BP 97/63 03/24/25 08:55   Temp 97 °F (36.1 °C) 03/24/25 08:55   Pulse 61 03/24/25 08:55   Resp 16 03/24/25 08:55   SpO2 96 % 03/24/25 08:55           Post Anesthesia Care and Evaluation    Patient location during evaluation: PHASE II  Patient participation: complete - patient participated  Level of consciousness: awake and awake and alert  Pain score: 0  Pain management: adequate    Airway patency: patent  Anesthetic complications: No anesthetic complications  PONV Status: none  Cardiovascular status: acceptable  Respiratory status: acceptable  Hydration status: acceptable    Comments: Patient discharged according to acceptable Mary score per RN assessment. See nursing records for further information.     Blood pressure 97/63, pulse 61, temperature 97 °F (36.1 °C), temperature source Temporal, resp. rate 16, SpO2 96%, not currently  breastfeeding.

## 2025-03-24 NOTE — OP NOTE
Octavio Melgoza  : 1980  MRN: [id number]  [facility]: [id number]  Date: 2022    Operative Note      Pre-op Diagnosis:  Intramural leiomyoma of uterus [D25.1]  Complex cyst of right ovary [N83.291]  History of tubal ligation [Z98.51]  History of endometrial ablation [Z98.890]  Pelvic pressure in female [R10.2]  Family history of breast cancer [Z80.3]  Atypical ductal hyperplasia of breast [N60.99]  Female dyspareunia [N94.10]  Dysmenorrhea  Hematometra    Post-op Diagnosis:  Intramural leiomyoma of uterus [D25.1]  Complex cyst of right ovary [N83.291]  History of tubal ligation [Z98.51]  History of endometrial ablation [Z98.890]  Pelvic pressure in female [R10.2]  Family history of breast cancer [Z80.3]  Atypical ductal hyperplasia of breast [N60.99]  Female dyspareunia [N94.10]  Dysmenorrhea  Hematometra      Procedure: Procedure(s):  TOTAL LAPAROSCOPIC HYSTERECTOMY BILATERAL SALPINGOOPHORECTOMY WITH DAVINCI ROBOT, CYSTO     Surgeon: Surgeon(s):  Bill To MD       Anesthesia: General     Estimated Blood Loss: 15   mls   Fluids: 600   mls   Urine output: 150   mls   Drains: none   ABx: IV 2g Ancef clear       Specimens:  Uterus, cervix, bilateral fallopian tubes and ovaries   Findings: On laparoscopy uterus bulky in appearance with multiple subserosal fibroids. Largest was posterior filling the cul-de-sac. Prior tubal noted with remaining fimbriae bilaterally. Ovaries atrophic with right ovary enlarged. Liver , stomach, and appendix appeared normal. Gallbladder not seen. Colon with diverticulosis. Cystoscopy with bilateral spill of clear stained urine from each ureteral orifice. Bladder was water tight without foreign bodies, masses/tumors, or lesions on cystoscopy.    Complications: none     PROCEDURE IN DETAIL:  Patient was taken to the operating room where general anesthesia was established. She was placed in the dorsal lithotomy position using Hesham stirrups to support the lower  extremities with her arms tucked to her side. Antibiotics were confirmed to have been administered. She was prepped and draped in the usual sterile fashion. A Jones catheter was inserted. A time-out procedure was performed to confirm the correct procedure and correct patient.     Attention was then turned to the abdomen.   0.5% marcaine with epinephrine was injected at the planned site of entry. A small vertical incision was made approximately 4 cm superior to the umbilicus. Under direct visualization using the optivue trocar, direct entry with the trocar and camera were inserted uneventfully into the peritoneal cavity. The peritoneal cavity was then insufflated with CO2 gas. Opening pressure <8 mmHg. Pneumoperitoneum set to a maximum pressure of 15 mmHg. Examination of the peritoneal cavity revealed no signs of injury from entry. Subsequent robotic-laparoscopic 8mm trocars were then inserted under direct visualization in a similar fashion. These trocars were placed in a crescent-shaped fashion along the upper abdominal quadrants. A total of 4 trocars (3 robotic and 1 assistant port) were placed. The patient was then placed in 26 degrees Trendelenburg position.      The robotic device was then docked with the following instruments in each robotic port (from patient left to right):   1) n/a  2) fenestrated bipolar forceps   3) 0 degree camera   4) monopolar scissors     Attention was then turned to the pelvis. A speculum was then placed into the vagina where the anterior cervix was grasped with a single-tooth tenaculum.  Cervix was then dilated to allow for introduction of the V-care uterine manipulator.  2-0 Vicryl stay sutures were then placed on the cervix at the 3-o'clock positions. The medium V-care uterine manipulator was placed and the stay sutures were tied to the V-care.     Round ligaments were divided on each side. The retroperitoneum was opened and developed. The infundibulopelvic ligaments were isolated,  skeletonized, and divided. Ureters were identified, in full view, and well away from dissection.  Hemostasis was appreciated. The monopolar scissors were then used to incise the broad ligament anteriorly to develop the bladder flap.     The left uterine artery was then cauterized with the fenestrated bipolar and then cut with the monopolar scissors. This was again repeated in a similar fashion for the right uterine artery. The bladder was then further retracted off the lower uterine segment and off the cervix anteriorly with blunt and sharp dissection with the monopolar scissors. At this point, the uterus was noted to be blanched in color. The uterus and cervix were then amputated from the vagina using the monopolar cautery. The uterus was then extracted through the vagina. The vaginal cuff was then closed using the #0 V- day suture in a running fashion starting at the right vaginal cuff apex and advancing towards the left apex, incorporating the uterosacrals for apical support. The remaining suture was placed in a running fashion as an imbricating stitch on the vaginal cuff. Good hemostasis was noted.  Attention was then turned to the perineum.     The Jones catheter was removed and the cystoscope was then introduced through the urethra into the bladder. Under direct visualization using normal saline solution distending media, the ureters were identified bilaterally and efflux was noted from the ureteral orifices bilaterally. The cystoscope was removed. The perineum was evaluated and noted intact. Speculum exam noted hemostatic, well approximated vaginal cuff.  This concluded the procedure. The robot was undocked. Pneumoperitoneum was evacuated and all laparoscopic trocars were removed. The skin incisions were then closed with 4-O Monocryl in simple, interrupted stitches. Skin glue was applied to the incisions.     All needle, sponge, and instrument counts were noted to be correct x 3 at the end of the  procedure. The patient tolerated the procedure well and was taken to the recovery room in stable condition.     Patient's spouse/family updated following surgery. Surgery, recovery, and post-operative expectations discussed. They were encouraged to call with any questions postoperatively. Their questions were answered. They expressed understanding.     Bill To MD   3/24/2025  08:40 CDT

## 2025-03-24 NOTE — ANESTHESIA PREPROCEDURE EVALUATION
Anesthesia Evaluation     no history of anesthetic complications:   NPO Solid Status: > 8 hours  NPO Liquid Status: > 8 hours           Airway   Mallampati: II  No difficulty expected  Dental          Pulmonary    (+) a smoker (quit 2/2024) Former,  Cardiovascular   Exercise tolerance: good (4-7 METS)    (+) hypertension, hyperlipidemia  (-) CAD      Neuro/Psych  (+) headaches  (-) seizures, TIA, CVA  GI/Hepatic/Renal/Endo    (+) GERD  (-) liver disease, no renal disease, diabetes    Musculoskeletal     Abdominal    Substance History      OB/GYN          Other                    Anesthesia Plan    ASA 2     general     intravenous induction     Anesthetic plan, risks, benefits, and alternatives have been provided, discussed and informed consent has been obtained with: patient.    CODE STATUS:

## 2025-03-24 NOTE — ANESTHESIA PROCEDURE NOTES
Airway  Reason: elective    Date/Time: 3/24/2025 7:03 AM  Airway not difficult    General Information and Staff    Patient location during procedure: OR  CRNA/CAA: Najma John CRNA    Indications and Patient Condition  Indications for airway management: airway protection    Preoxygenated: yes    Mask difficulty assessment: 1 - vent by mask    Final Airway Details    Final airway type: endotracheal airway      Successful airway: ETT  Cuffed: yes   Successful intubation technique: direct laryngoscopy  Adjuncts used in placement: intubating stylet  Endotracheal tube insertion site: oral  Blade: Bennie  Blade size: 3.5.  ETT size (mm): 7.0  Cormack-Lehane Classification: grade I - full view of glottis  Placement verified by: chest auscultation and capnometry   Cuff volume (mL): 8  Measured from: teeth  ETT/EBT  to teeth (cm): 22  Number of attempts at approach: 1  Assessment: lips, teeth, and gum same as pre-op and atraumatic intubation

## 2025-03-31 ENCOUNTER — OFFICE VISIT (OUTPATIENT)
Dept: NEUROLOGY | Facility: CLINIC | Age: 45
End: 2025-03-31
Payer: COMMERCIAL

## 2025-03-31 ENCOUNTER — TELEPHONE (OUTPATIENT)
Dept: NEUROLOGY | Facility: CLINIC | Age: 45
End: 2025-03-31

## 2025-03-31 VITALS
SYSTOLIC BLOOD PRESSURE: 122 MMHG | BODY MASS INDEX: 27.42 KG/M2 | WEIGHT: 149 LBS | HEIGHT: 62 IN | HEART RATE: 73 BPM | DIASTOLIC BLOOD PRESSURE: 80 MMHG | OXYGEN SATURATION: 98 %

## 2025-03-31 DIAGNOSIS — G43.719 INTRACTABLE CHRONIC MIGRAINE WITHOUT AURA AND WITHOUT STATUS MIGRAINOSUS: Primary | ICD-10-CM

## 2025-03-31 PROCEDURE — 99204 OFFICE O/P NEW MOD 45 MIN: CPT | Performed by: PSYCHIATRY & NEUROLOGY

## 2025-03-31 NOTE — PROGRESS NOTES
Mao Melgoza presents to [facility]    History of Present Illness      43 yo female with depression, HLD referred for migraines.  Has had these for about 20 years.  Is having 3-4 migraines a month with daily headaches.  Lasting all day.  Gets nausea and vomiting with severe migraines.  Does get sensitivity to light sound and smell.  Can get some nausea and bright spots in her vision before migraine.  Describes the pain as pressure and throbbing which is primarily on the front and left side.  Less commonly on the right side.    Had sinus surgery for the headaches which helped initially and then not anymore.     Mom has migraines.     Current related meds: topamax, effexor, propranolol  Previously tried imitrex (reports reaction), relpax             Current Outpatient Medications:     acetaminophen (Tylenol) 325 MG tablet, Take 2 tablets by mouth Every 6 (Six) Hours As Needed for Mild Pain, Moderate Pain, Headache or Fever., Disp: 60 tablet, Rfl: 0    Azelastine HCl 137 MCG/SPRAY solution, Use 1 spray into the nostril(s) as directed by provider 2 (Two) Times a Day., Disp: 30 mL, Rfl: 2    fluticasone (FLONASE) 50 MCG/ACT nasal spray, Administer 1 spray into the nostril(s) as directed by provider Daily., Disp: 16 g, Rfl: 1    ibuprofen (ADVIL,MOTRIN) 600 MG tablet, Take 1 tablet by mouth Every 6 (Six) Hours As Needed for Mild Pain, Moderate Pain, Fever or Headache., Disp: 40 tablet, Rfl: 1    multivitamin with minerals tablet tablet, Take 1 tablet by mouth Daily., Disp: , Rfl:     ondansetron ODT (ZOFRAN-ODT) 4 MG disintegrating tablet, Place 1 tablet on the tongue Every 8 (Eight) Hours As Needed for Vomiting or Nausea., Disp: 21 tablet, Rfl: 0    propranolol LA (INDERAL LA) 120 MG 24 hr capsule, Take 1 capsule by mouth Daily., Disp: 30 capsule, Rfl: 3    rOPINIRole (REQUIP) 1 MG tablet, Take 1 tablet by mouth every night at bedtime. Take 1-3 hour before bedtime.,  "Disp: 90 tablet, Rfl: 3    rosuvastatin (CRESTOR) 10 MG tablet, Take 1 tablet by mouth Every Night., Disp: 90 tablet, Rfl: 3    tiZANidine (ZANAFLEX) 4 MG tablet, Take 1 tablet by mouth at bedtime as needed for muscle spasms., Disp: 30 tablet, Rfl: 2    topiramate (TOPAMAX) 100 MG tablet, Take 1 tablet by mouth Daily., Disp: 30 tablet, Rfl: 3    venlafaxine XR (EFFEXOR-XR) 37.5 MG 24 hr capsule, Take 1 capsule by mouth Daily with food, Disp: 90 capsule, Rfl: 3    zolpidem (Ambien) 10 MG tablet, Take 1 tablet by mouth At Night As Needed., Disp: 90 tablet, Rfl: 3       Objective   Vital Signs:   Ht 157.5 cm (62\")   Wt 67.6 kg (149 lb)   BMI 27.25 kg/m²     Physical Exam  Constitutional:       General: She is awake.   Eyes:      Extraocular Movements: Extraocular movements intact.   Neurological:      Mental Status: She is alert.   Psychiatric:         Speech: Speech normal.        Neurological Exam  Mental Status  Awake and alert. Speech is normal. Language is fluent with no aphasia.    Cranial Nerves  CN III, IV, VI: Extraocular movements intact bilaterally.  CN VII: Full and symmetric facial movement.    Gait  Casual gait is normal including stance, stride, and arm swing.      Result Review :            Results                 Assessment and Plan     Assessment & Plan    45 yo female with depression, HLD with chronic migraines. Also CDH. Currently on topamax, Effexor, propranolol. Has tried and failed imitrex, relpax. CT Head 2023 unremarkable. Needs to reduce OTC use. Needs preventative.    Plan:     Authorization for Botox   Trial of Nurtec at onset of migraine.   Reduce OTC use.           Follow Up   No follow-ups on file.  Patient was given instructions and counseling regarding her condition or for health maintenance advice. Please see specific information pulled into the AVS if appropriate.         "

## 2025-03-31 NOTE — TELEPHONE ENCOUNTER
PHARMACY CALLING TO ADVISE, INSURANCE WANTS PT TO TRY UBRELVY FIRST BEFORE THEY WILL OKAY NURTEC    RX NEEDS NEW PRESCRIPTION FOR UBRELVY    RX:      [facility] - [place]

## 2025-04-02 ENCOUNTER — SPECIALTY PHARMACY (OUTPATIENT)
Dept: PHARMACY | Facility: TELEHEALTH | Age: 45
End: 2025-04-02
Payer: COMMERCIAL

## 2025-04-02 NOTE — PROGRESS NOTES
Specialty Pharmacy Patient Management Program  Initial Assessment     Gloria Melgoza is a 44 y.o. female with migraines and enrolled in the Patient Management program offered by [facility]. An initial outreach was conducted, including assessment of therapy appropriateness and [facility]. The patient was introduced to services offered by [facility], including: regular assessments, refill coordination, curbside pick-up or mail order delivery options, prior authorization maintenance, and financial assistance programs as applicable. The patient was also provided with contact information for the pharmacy team.     Insurance Coverage & Financial Support  Billing [employer] and manufacture copay card     Relevant Past Medical History and Comorbidities  Relevant medical history and concomitant health conditions were discussed with the patient. The patient's chart has been reviewed for relevant past medical history and comorbid health conditions and updated as necessary.   Past Medical History:   Diagnosis Date    Abnormal Pap smear of cervix In my 20’s    Severe dysplasia    Anxiety     Asthma     Increasing shortness of breath in revent months    Cervical dysplasia     In my 20’s, had it frozen off in doctor’s office    Cluster headache     Colon polyp     CTS (carpal tunnel syndrome)     Release surgery on both hands done    Dental disease     Almost all top teeth are crowns    Depression     Diverticulitis of colon     Dizziness     Always attributed to blood pressure    Fibrocystic breast     Fibroid     GERD (gastroesophageal reflux disease)     Headache     Had most of my adult life, increasing in recent months    Headache, tension-type     Hyperlipidemia     Hypertension     Insomnia     Migraine     In my 20’s    Muscle spasm of right shoulder     Nosebleed     Ovarian cyst     On recent CT    PMS (premenstrual syndrome)     Rectal  bleeding     Recurrent pregnancy loss, antepartum condition or complication     One right before each live pregnancy/birth    Sinusitis     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    Sleep apnea     Probably    Urinary tract infection     Varicella     As a young child     Social History     Socioeconomic History    Marital status:    Tobacco Use    Smoking status: Former     Current packs/day: 0.00     Average packs/day: 0.9 packs/day for 33.3 years (30.0 ttl pk-yrs)     Types: Cigarettes     Quit date: 2024     Years since quittin.1    Smokeless tobacco: Never    Tobacco comments:     Don’t smoke cigarettes but have started using vape   Vaping Use    Vaping status: Every Day    Substances: Nicotine, Flavoring   Substance and Sexual Activity    Alcohol use: Yes     Alcohol/week: 2.0 standard drinks of alcohol     Types: 2 Glasses of wine per week    Drug use: Never    Sexual activity: Yes     Partners: Male     Birth control/protection: Tubal ligation     Problem list reviewed by Renard Baez RPH on 2025 at  1:58 PM    Allergies  Known allergies and reactions were discussed with the patient. The patient's chart has been reviewed for allergy information and updated as necessary.   Sulfa antibiotics and Wound dressing adhesive  Allergies reviewed by Renard Baez RPH on 2025 at  1:58 PM    Current Medication List  This medication list has been reviewed with the patient and evaluated for any interactions or necessary modifications/recommendations, and updated to include all prescription medications, OTC medications, and supplements the patient is currently taking. This list reflects what is contained in the patient's profile, which has also been marked as reviewed to communicate to other providers it is the most up to date version of the patient's current medication therapy.     Current Outpatient Medications:     acetaminophen (Tylenol) 325 MG tablet, Take 2 tablets  by mouth Every 6 (Six) Hours As Needed for Mild Pain, Moderate Pain, Headache or Fever., Disp: 60 tablet, Rfl: 0    Azelastine HCl 137 MCG/SPRAY solution, Use 1 spray into the nostril(s) as directed by provider 2 (Two) Times a Day., Disp: 30 mL, Rfl: 2    fluticasone (FLONASE) 50 MCG/ACT nasal spray, Administer 1 spray into the nostril(s) as directed by provider Daily., Disp: 16 g, Rfl: 1    ibuprofen (ADVIL,MOTRIN) 600 MG tablet, Take 1 tablet by mouth Every 6 (Six) Hours As Needed for Mild Pain, Moderate Pain, Fever or Headache., Disp: 40 tablet, Rfl: 1    multivitamin with minerals tablet tablet, Take 1 tablet by mouth Daily., Disp: , Rfl:     ondansetron ODT (ZOFRAN-ODT) 4 MG disintegrating tablet, Place 1 tablet on the tongue Every 8 (Eight) Hours As Needed for Vomiting or Nausea., Disp: 21 tablet, Rfl: 0    propranolol LA (INDERAL LA) 120 MG 24 hr capsule, Take 1 capsule by mouth Daily., Disp: 30 capsule, Rfl: 3    rimegepant sulfate ODT (Nurtec) 75 MG disintegrating tablet, Place 1 tablet under the tongue As Needed (migraine) for up to 180 doses., Disp: 16 tablet, Rfl: 5    rOPINIRole (REQUIP) 1 MG tablet, Take 1 tablet by mouth every night at bedtime. Take 1-3 hour before bedtime., Disp: 90 tablet, Rfl: 3    rosuvastatin (CRESTOR) 10 MG tablet, Take 1 tablet by mouth Every Night., Disp: 90 tablet, Rfl: 3    tiZANidine (ZANAFLEX) 4 MG tablet, Take 1 tablet by mouth at bedtime as needed for muscle spasms., Disp: 30 tablet, Rfl: 2    topiramate (TOPAMAX) 100 MG tablet, Take 1 tablet by mouth Daily., Disp: 30 tablet, Rfl: 3    venlafaxine XR (EFFEXOR-XR) 37.5 MG 24 hr capsule, Take 1 capsule by mouth Daily with food, Disp: 90 capsule, Rfl: 3    zolpidem (Ambien) 10 MG tablet, Take 1 tablet by mouth At Night As Needed., Disp: 90 tablet, Rfl: 3  No current facility-administered medications for this visit.  Medicines reviewed by Renard Baez RPH on 4/2/2025 at  1:58 PM    Drug Interactions  none     Relevant  Laboratory Values  Lab Results   Component Value Date    GLUCOSE 131 (H) 03/04/2025    CALCIUM 9.3 03/04/2025     03/04/2025    K 3.3 (L) 03/04/2025    CO2 20.0 (L) 03/04/2025     (H) 03/04/2025    BUN 11 03/04/2025    CREATININE 0.68 03/04/2025    BCR 16.2 03/04/2025    ANIONGAP 13.0 03/04/2025     Lab Results   Component Value Date    WBC 6.34 03/04/2025    HGB 13.6 03/04/2025    HCT 40.3 03/04/2025    MCV 89.0 03/04/2025     03/04/2025    INR 1.06 12/18/2024     Lab Value Review  The above lab values have been reviewed; the following specialty medication(s) dose adjustment(s) are recommended: none.    Initial Education Provided for Specialty Medication  The patient has been provided with the following education and any applicable administration techniques (i.e. self-injection) have been demonstrated for the therapies indicated. All questions and concerns have been addressed prior to the patient receiving the medication, and the patient has verbalized understanding of the education and any materials provided. Additional patient education shall be provided and documented upon request by the patient, provider or payer.      Nurtec (rimegepant)  Medication Expectations   Why am I taking this medication? You are taking this medication for migraine prophylaxis or to treat an acute migraine.   What should I expect while on this medication? You should expect to see a decrease in the frequency and severity of your migraines.   How does the medication work? Nurtec is a monoclonal antibody that binds to calcitonin gene-related peptide (CGRP) and blocks its binding to the receptor decreasing the severity of migraines.   How long will I be on this medication for? The amount of time you will be on this medication will be determined by your doctor and your response to the medication.    How do I take this medication? Take as directed on your prescription label.   What are some possible side effects? Potential  side effects including, but not limited to nausea. Pt verbalized understanding.   What happens if I miss a dose? Take the missed dose as soon as possible, and resume the every other day timed from the last dose..     Medication Safety   What are things I should warn my doctor immediately about? Hypersensitivity reactions - trouble breathing or swallowing.   What are things that I should be cautious of? Hypersensitivity reactions (eg, dyspnea, rash), including delayed serious reactions, have occurred; discontinue use if suspected    What are some medications that can interact with this one? Avoid concomitant administration of Nurtec ODT with strong inhibitors of CY, strong or moderate inducers of CYP3A or inhibitors of P-gp or BCRP. Avoid another dose of Nurtec ODT within 48 hours when it is administered with moderate inhibitors of CY.  Ask your pharmacist or health care provider before starting new medications     Medication Storage/Handling   How should I handle this medication? Keep this medication out of reach of pets/children in original container. Ensure hands are dry before opening blister pack.   How does this medication need to be stored? Store at room temperature away from heat/cold, sunlight or moisture   How should I dispose of this medication? There should not be a need to dispose of this medication unless your provider decides to change the dose or therapy. If that is the case, take to your local police station for proper disposal. Some pharmacies also have take-back bins for medication drop-off.      Resources/Support   How can I remind myself to take this medication? You can download reminder apps to help you manage your refills. You may also set an alarm on your phone to remind you. The pharmacy carries pill boxes that you can place next to an area you pass everyday (such as where you place your car keys or where you charge your phone)   Is financial support available?  Yes, [employer] can provide co-pay cards if you have commercial insurance or patient assistance if you have Medicare or no insurance.    Which vaccines are recommended for me? Talk to your doctor about these vaccines: Flu, Coronavirus (COVID-19), Pneumococcal (pneumonia), Tdap, Hepatitis B, Zoster (shingles)        Adherence, Self-Administration, and Current Therapy Problems  Adherence related to the patient's specialty therapy was discussed with the patient. The Adherence segment of this outreach has been reviewed and updated.          Additional Barriers to Patient Self-Administration: none  Methods for Supporting Patient Self-Administration: none    Open Medication Therapy Problems  No medication therapy recommendations to display    Goals of Therapy   Goals Addressed Today        Specialty Pharmacy General Goal      Reduce severity and duration of migraine by 50%.              Reassessment Plan & Follow-Up  Medication Therapy Changes: start nurtec ODT 75 mg daily as needed for migraine max 1 tablet in 24 hours  Additional Plans, Therapy Recommendations, or Therapy Problems to Be Addressed: none   Pharmacist to perform regular reassessments no more than (6) months from the previous assessment.  Welcome information and patient satisfaction survey to be sent by retail team with patient's initial fill.  Care Coordinator to set up future refill outreaches, coordinate prescription delivery, and escalate clinical questions to pharmacist.     Attestation  I attest the patient was actively involved in and has agreed to the above plan of care. I attest that the initiated specialty medication(s) are appropriate for the patient based on my assessment. If the prescribed therapy is at any point deemed not appropriate based on the current or future assessments, a consultation will be initiated with the patient's specialty care provider to determine the best course of action. The revised plan of therapy will be documented along  with any reassessments and/or additional patient education provided.     Electronically signed by Renard Baez RPH, 04/02/25, 2:00 PM EDT.

## 2025-04-03 ENCOUNTER — PATIENT MESSAGE (OUTPATIENT)
Dept: NEUROLOGY | Facility: CLINIC | Age: 45
End: 2025-04-03
Payer: COMMERCIAL

## 2025-04-08 ENCOUNTER — OFFICE VISIT (OUTPATIENT)
Dept: OBSTETRICS AND GYNECOLOGY | Age: 45
End: 2025-04-08
Payer: COMMERCIAL

## 2025-04-08 VITALS
DIASTOLIC BLOOD PRESSURE: 74 MMHG | SYSTOLIC BLOOD PRESSURE: 100 MMHG | BODY MASS INDEX: 27.05 KG/M2 | WEIGHT: 147 LBS | HEIGHT: 62 IN

## 2025-04-08 DIAGNOSIS — Z09 POSTOPERATIVE FOLLOW-UP: Primary | ICD-10-CM

## 2025-04-08 PROCEDURE — 99024 POSTOP FOLLOW-UP VISIT: CPT | Performed by: OBSTETRICS & GYNECOLOGY

## 2025-04-08 NOTE — LETTER
April 8, 2025     Patient: Gloria Melgoza   YOB: 1980   Date of Visit: 4/8/2025       To Whom It May Concern:    It is my medical opinion that Gloria Melgoza may return to light duty on 4/14/2025, with the following restrictions: no lifting > 25 lbs, allow bathroom breaks as needed, allow chair to sit.           Sincerely,        Bill To MD    CC: No Recipients

## 2025-04-08 NOTE — PROGRESS NOTES
"    Bill To MD  [facility]  [address]  [place] [address]  Office [phone]  Fax [phone]      Subjective   Subjective     Gloriapurvi Melgoza is a 44 y.o. female who presents to the clinic 2 weeks status post rTLH/BSO/Cystoscopy.     Eating a regular diet with difficulty. Minimal appetite - improving  Bowel movements are normal. Slow  Voiding without difficulty.  Pain is controlled without any medications.  Vaginal bleeding: scant staining    Review of Systems  ROS notable for: meeting postoperative milestones    The following portions of the patient's history were reviewed and updated as appropriate: allergies, current medications, past family history, past medical history, past social history, past surgical history, and problem list.          Objective   Objective   Visit Vitals  /74 (BP Location: Left arm, Patient Position: Sitting, Cuff Size: Large Adult)   Ht 157.5 cm (62\")   Wt 66.7 kg (147 lb)   BMI 26.89 kg/m²     Physical Exam  Vitals and nursing note reviewed. Exam conducted with a chaperone present.   Constitutional:       Appearance: Normal appearance.   HENT:      Head: Normocephalic and atraumatic.   Eyes:      General: No scleral icterus.     Conjunctiva/sclera: Conjunctivae normal.   Pulmonary:      Effort: Pulmonary effort is normal. No respiratory distress.   Abdominal:      General: There is no distension.      Palpations: Abdomen is soft.      Tenderness: There is no abdominal tenderness. There is no guarding or rebound.      Comments: Incisions: clean and dry, well healed and approximated, no erythema or discharge, no TTP   Genitourinary:     General: Normal vulva.      Exam position: Lithotomy position.      Pubic Area: No rash or pubic lice.       Labia:         Right: No rash, tenderness or lesion.         Left: No rash, tenderness or lesion.       Urethra: No prolapse or urethral lesion.      Vagina: Normal.      Uterus: Absent.       Adnexa: Right adnexa " normal and left adnexa normal.      Rectum: No external hemorrhoid.      Comments: Vaginal cuff - well healed and approximated, sutures still present; no dehiscence, no bleeding, no abnormal discharge    Cervix: absent  Neurological:      Mental Status: She is alert.               Result Review    Op Note by Bill To MD (03/24/2025 07:20)     On laparoscopy uterus bulky in appearance with multiple subserosal fibroids. Largest was posterior filling the cul-de-sac. Prior tubal noted with remaining fimbriae bilaterally. Ovaries atrophic with right ovary enlarged. Liver , stomach, and appendix appeared normal. Gallbladder not seen. Colon with diverticulosis. Cystoscopy with bilateral spill of clear stained urine from each ureteral orifice. Bladder was water tight without foreign bodies, masses/tumors, or lesions on cystoscopy.      Tissue Pathology Exam (03/24/2025 08:10)   1.  Uterus, cervix, bilateral tubes and ovaries, hysterectomy and bilateral salpingo-oophorectomy:  Incidental dermoid cyst of right ovary.  Chronic cervicitis.  Previous ablation of endometrium.  Diffuse adenomyosis.  Multiple myometrial leiomyomata.  Unremarkable left and right fallopian tubes and fimbrial epithelial lining.  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  Uterine weight of 196.2 g.          Assessment & Plan   Assessment / Plan   Doing well postoperatively.  Operative findings again reviewed.   Pathology report discussed.  Incision care reviewed.  Activity restrictions reviewed:  pelvic rest, no lifting >25 lbs  Anticipated return to work: 1-2 weeks and work letter provided.    Diagnoses and all orders for this visit:    1. Postoperative follow-up (Primary)         Return in about 4 weeks (around 5/6/2025) for Post-op.         Bill To MD  4/8/2025  08:29 CDT

## 2025-04-09 ENCOUNTER — TELEPHONE (OUTPATIENT)
Dept: NEUROLOGY | Facility: CLINIC | Age: 45
End: 2025-04-09
Payer: COMMERCIAL

## 2025-04-09 NOTE — PROGRESS NOTES
Botox injections for Chronic Migraine           Headache log data:  See scanned data           A written consent was obtained from the patient to receive repeat injections of Botulinum toxin type A into muscles of the scalp, face, and neck in standardized fashion according to recommended by American Headache Society and widely-accepted FDA approve PREEMT trial protocol. The procedure is performed in compliance with clean technique.     155 Units of Botox injected using 30 G needle and 1 ml syringes into 31 sites as follows (45 units of 200 unit vial used are lost in dilution and transition, or discarded):     5 units into each  muscle and into the procerus muscle (15 units total)     5 units each x 2 sites into each frontalis muscle (20 units total)     5 units each x 4 sites into each temporalis muscle (40 units total)     5 units each x 3 sites into each occipitalis muscle (30 units total)     5 units each x 2 sites into each splenius capitis (20 units total)     5 units each x 3 sites into each trapezius muscle (30 units total)     Patient tolerated injections well, without any immediate side effects or complications. Patient was instructed to monitor for potential late side effects from Botox treatment, which were explained to the patient in details. Patient is instructed to call if any unusual feeling develops at the sites of injections, if there is an unexpected muscle weakness, ptosis, or any difficulty with breathing. Otherwise, patient will be brought back for the next treatment (per protocol) in 3 months.

## 2025-04-14 ENCOUNTER — PROCEDURE VISIT (OUTPATIENT)
Dept: NEUROLOGY | Facility: CLINIC | Age: 45
End: 2025-04-14
Payer: COMMERCIAL

## 2025-04-14 DIAGNOSIS — G43.719 INTRACTABLE CHRONIC MIGRAINE WITHOUT AURA AND WITHOUT STATUS MIGRAINOSUS: Primary | ICD-10-CM

## 2025-04-21 ENCOUNTER — HOSPITAL ENCOUNTER (OUTPATIENT)
Dept: MAMMOGRAPHY | Facility: HOSPITAL | Age: 45
Discharge: HOME | End: 2025-04-21
Admitting: STUDENT IN AN ORGANIZED HEALTH CARE EDUCATION/TRAINING PROGRAM
Payer: COMMERCIAL

## 2025-04-21 DIAGNOSIS — N60.99 ATYPICAL DUCTAL HYPERPLASIA OF BREAST: ICD-10-CM

## 2025-04-21 PROCEDURE — 77065 DX MAMMO INCL CAD UNI: CPT

## 2025-04-21 PROCEDURE — G0279 TOMOSYNTHESIS, MAMMO: HCPCS

## 2025-04-25 ENCOUNTER — SPECIALTY PHARMACY (OUTPATIENT)
Dept: PHARMACY | Facility: TELEHEALTH | Age: 45
End: 2025-04-25
Payer: COMMERCIAL

## 2025-04-25 NOTE — PROGRESS NOTES
Specialty Pharmacy Patient Management Program  [facility]       Gloria was contacted today regarding refills of their Nurtec medication(s).    [facility] Questionnaire Responses      Flowsheet Row Questionnaire Series Submission from 4/25/2025 in Initial Department with Evaristo, Generic Provider   Changes to allergies? No    Changes to medications? No    New conditions or infections since last clinic visit No    Unplanned office visit, urgent care, ED, or hospital admission in the last 4 weeks  No    How does patient/caregiver feel medication is working? Good    Financial problems or insurance changes  No    Since the previous refill, were any specialty medication doses or scheduled injections missed or delayed?  No    Delivery address Home    Doses left of specialty medications 4    Copay verified? Yes    Delivery Date Selection 05/02/25             Refill Questions      Flowsheet Row Most Recent Value   Does this patient require a clinical escalation to a pharmacist? No            Delivery Questions      Flowsheet Row Most Recent Value   Delivery method UPS   Delivery address verified with patient/caregiver? Yes   Other address preferred N/A   Number of medications in delivery 1   Medication(s) being filled and delivered Rimegepant Sulfate (NURTEC-ODT)   Copay verified? Yes   Copay amount $0   Copay form of payment No copayment ($0)   Signature Required No                   Follow-up: 21 day(s)     Omid Kuo  4/25/2025  08:55 EDT

## 2025-04-28 ENCOUNTER — OFFICE VISIT (OUTPATIENT)
Dept: SURGERY | Facility: CLINIC | Age: 45
End: 2025-04-28
Payer: COMMERCIAL

## 2025-04-28 VITALS
BODY MASS INDEX: 27.05 KG/M2 | HEIGHT: 62 IN | DIASTOLIC BLOOD PRESSURE: 80 MMHG | WEIGHT: 147 LBS | SYSTOLIC BLOOD PRESSURE: 134 MMHG

## 2025-04-28 DIAGNOSIS — Z98.890 S/P LUMPECTOMY, LEFT BREAST: Primary | ICD-10-CM

## 2025-04-28 DIAGNOSIS — Z80.3 FAMILY HISTORY OF BREAST CANCER: ICD-10-CM

## 2025-04-28 DIAGNOSIS — E66.3 OVERWEIGHT WITH BODY MASS INDEX (BMI) OF 28 TO 28.9 IN ADULT: ICD-10-CM

## 2025-04-28 NOTE — PROGRESS NOTES
Office Established Patient Note:     Referring Provider: No ref. provider found    Chief Complaint   Patient presents with    Follow-up       Subjective .     History of present illness:  Gloria Melgoza is a 44 y.o. female s/p left lumpectomy x 2 in 10/24 for ADH and radial scar. She denies any new breast masses, skin changes or nipple discharge.   History of Present Illness  She underwent a hysterectomy in 03/2025, which was performed by Dr. To. The procedure was successful, and she was able to resume her work duties after a period of 3 weeks. She had a mammogram on Monday.       History  Past Medical History:   Diagnosis Date    Abnormal Pap smear of cervix In my 20’s    Severe dysplasia    Anxiety     Asthma     Increasing shortness of breath in revent months    Cervical dysplasia     In my 20’s, had it frozen off in doctor’s office    Cluster headache     Colon polyp     CTS (carpal tunnel syndrome)     Release surgery on both hands done    Dental disease     Almost all top teeth are crowns    Depression     Diverticulitis of colon     Dizziness     Always attributed to blood pressure    Fibrocystic breast     Fibroid     GERD (gastroesophageal reflux disease)     Headache     Had most of my adult life, increasing in recent months    Headache, tension-type     Hyperlipidemia     Hypertension     Insomnia     Migraine     In my 20’s    Muscle spasm of right shoulder     Nosebleed     Ovarian cyst     On recent CT    PMS (premenstrual syndrome)     Rectal bleeding     Recurrent pregnancy loss, antepartum condition or complication     One right before each live pregnancy/birth    Sinusitis     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    Sleep apnea     Probably    Urinary tract infection     Varicella     As a young child   ,   Past Surgical History:   Procedure Laterality Date    BREAST BIOPSY Right     benign    BREAST LUMPECTOMY WITH SENTINEL NODE BIOPSY Left 10/18/2024    Procedure:  LEFT BREAST LUMPECTOMY WITH MAG SEED GUIDANCE x 2;  Surgeon: Delfina Baker MD;  Location:  [facility] OR;  Service: General;  Laterality: Left;    CARPAL TUNNEL RELEASE Bilateral      SECTION Bilateral     x2    COLONOSCOPY      ENDOMETRIAL ABLATION Bilateral 2018    tubal ligtion at same time    ENDOSCOPIC FUNCTIONAL SINUS SURGERY (FESS) Bilateral 2024    Procedure: Bilateral functional endoscopic anterior ethmoidectomy with bilateral middle meatal antrostomy, Septoplasty, and Bilateral inferior turbinate reduction via Coblation;  Surgeon: Jadiel Smith MD;  Location:  [facility];  Service: ENT;  Laterality: Bilateral;    LAPAROSCOPIC TUBAL LIGATION      SINUS SURGERY  24    TOTAL LAPAROSCOPIC HYSTERECTOMY SALPINGO OOPHORECTOMY Bilateral 3/24/2025    Procedure: TOTAL LAPAROSCOPIC HYSTERECTOMY BILATERAL SALPINGOOPHORECTOMY WITH DAVINCI ROBOT, CYSTO;  Surgeon: Bill To MD;  Location:  [facility];  Service: Robotics - DaVinci;  Laterality: Bilateral;    TUBAL ABDOMINAL LIGATION  2018    WISDOM TOOTH EXTRACTION      In my 20’s   ,   Family History   Problem Relation Age of Onset    Seizures Mother     Migraines Mother     Cancer Mother         Pre cancerous lesions in the colon    Miscarriages / Stillbirths Mother     Hypertension Father     Diabetes Father     Breast cancer Paternal Grandmother     Cancer Paternal Grandmother         Breast and Colon Cancer    Cancer Maternal Grandmother         Bladder    COPD Maternal Grandmother     Dementia Maternal Grandmother     Cancer Paternal Grandfather         Lung    Heart disease Paternal Grandfather     Cancer Paternal Uncle         Bladder    Heart disease Maternal Grandfather     Depression Daughter     Drug abuse Daughter     Mental illness Daughter         Bipolar    Miscarriages / Stillbirths Daughter     Cancer Maternal Aunt         Lung, peritoneal   ,   Social History     Tobacco Use    Smoking status: Former     Current packs/day:  0.00     Average packs/day: 0.9 packs/day for 33.3 years (30.0 ttl pk-yrs)     Types: Cigarettes     Quit date: 2024     Years since quittin.1    Smokeless tobacco: Never    Tobacco comments:     Don’t smoke cigarettes but have started using vape   Vaping Use    Vaping status: Every Day    Substances: Nicotine, Flavoring   Substance Use Topics    Alcohol use: Yes     Alcohol/week: 2.0 standard drinks of alcohol     Types: 2 Glasses of wine per week    Drug use: Never   , (Not in a hospital admission)   and Allergies:  Sulfa antibiotics and Wound dressing adhesive    Current Outpatient Medications:     acetaminophen (Tylenol) 325 MG tablet, Take 2 tablets by mouth Every 6 (Six) Hours As Needed for Mild Pain, Moderate Pain, Headache or Fever., Disp: 60 tablet, Rfl: 0    Azelastine HCl 137 MCG/SPRAY solution, Use 1 spray into the nostril(s) as directed by provider 2 (Two) Times a Day., Disp: 30 mL, Rfl: 2    fluticasone (FLONASE) 50 MCG/ACT nasal spray, Place 1 spray into the nostril(s) as directed by provider Daily., Disp: 16 g, Rfl: 3    ibuprofen (ADVIL,MOTRIN) 600 MG tablet, Take 1 tablet by mouth Every 6 (Six) Hours As Needed for Mild Pain, Moderate Pain, Fever or Headache., Disp: 40 tablet, Rfl: 1    multivitamin with minerals tablet tablet, Take 1 tablet by mouth Daily., Disp: , Rfl:     ondansetron ODT (ZOFRAN-ODT) 4 MG disintegrating tablet, Place 1 tablet on the tongue Every 8 (Eight) Hours As Needed for Vomiting or Nausea., Disp: 21 tablet, Rfl: 0    propranolol LA (INDERAL LA) 120 MG 24 hr capsule, Take 1 capsule by mouth Daily., Disp: 30 capsule, Rfl: 3    rimegepant sulfate ODT (Nurtec) 75 MG disintegrating tablet, Place 1 tablet under the tongue As Needed for migraine, Disp: 16 tablet, Rfl: 5    rOPINIRole (REQUIP) 1 MG tablet, Take 1 tablet by mouth every night at bedtime. Take 1-3 hour before bedtime., Disp: 90 tablet, Rfl: 3    rosuvastatin (CRESTOR) 10 MG tablet, Take 1 tablet by mouth Every  "Night., Disp: 90 tablet, Rfl: 3    tiZANidine (ZANAFLEX) 4 MG tablet, Take 1 tablet by mouth at bedtime as needed for muscle spasms., Disp: 30 tablet, Rfl: 2    topiramate (TOPAMAX) 100 MG tablet, Take 1 tablet by mouth Daily., Disp: 30 tablet, Rfl: 3    venlafaxine XR (EFFEXOR-XR) 37.5 MG 24 hr capsule, Take 1 capsule by mouth Daily with food, Disp: 90 capsule, Rfl: 3    zolpidem (Ambien) 10 MG tablet, Take 1 tablet by mouth At Night As Needed., Disp: 90 tablet, Rfl: 3    Objective     Vital Signs   /80   Ht 157.5 cm (62\")   Wt 66.7 kg (147 lb)   BMI 26.89 kg/m²      Physical Exam:  General appearance - alert, well appearing, and in no distress  Mental status - alert, oriented to person, place, and time  Eyes - pupils equal and reactive, extraocular eye movements intact  Neurological - alert, oriented, normal speech, no focal findings or movement disorder noted  Breast Exam: Bilateral breasts without obvious deformities. Bilateral nipples everted. Patient examined in the supine position with the ipsilateral arm above the head. Left breast incisions well healed. no palpable masses bilaterally. No nipple discharge bilaterally. No palpable axillary nor supraclavicular adenopathy bilaterally.     Physical Exam         Results Review:     The following data was reviewed by: Delfina Baker MD on 04/28/2025:    Mammo Diagnostic Digital Tomosynthesis Left With CAD (04/21/2025 14:10)   BIRADS 2   Results  Imaging  Mammogram shows scarring of the breast, prior excisional biopsy, BI-RADS 2, benign.       Assessment & Plan       Diagnoses and all orders for this visit:    1. S/P lumpectomy, left breast (Primary)  -     Mammo Screening Digital Tomosynthesis Bilateral With CAD; Future    2. Family history of breast cancer  -     Mammo Screening Digital Tomosynthesis Bilateral With CAD; Future    3. Overweight with body mass index (BMI) of 28 to 28.9 in adult       Assessment & Plan  1. S/p Lumpectomy x 2 for ADH " and radial scar.   Her recent mammogram showed scarring of the breast and a prior excisional biopsy, which are expected findings. The BI-RADS 2 classification indicates a benign condition. A follow-up bilateral mammogram is scheduled for 6 months from now. She will see Danni, the PA, in 6 months to review the mammogram results and conduct an exam.    Follow-up  The patient will follow up in 6 months.    PROCEDURE  The patient underwent a hysterectomy in 03/2025.       Delfina Baker MD  04/28/25  16:00 CDT    Patient or patient representative verbalized consent for the use of Ambient Listening during the visit with  Delfina Baker MD for chart documentation. 4/30/2025  13:25 CDT

## 2025-04-29 ENCOUNTER — SPECIALTY PHARMACY (OUTPATIENT)
Dept: PHARMACY | Facility: TELEHEALTH | Age: 45
End: 2025-04-29
Payer: COMMERCIAL

## 2025-04-30 NOTE — PATIENT INSTRUCTIONS

## 2025-05-07 ENCOUNTER — OFFICE VISIT (OUTPATIENT)
Dept: OBSTETRICS AND GYNECOLOGY | Age: 45
End: 2025-05-07
Payer: COMMERCIAL

## 2025-05-07 VITALS
WEIGHT: 150.4 LBS | SYSTOLIC BLOOD PRESSURE: 126 MMHG | BODY MASS INDEX: 27.68 KG/M2 | DIASTOLIC BLOOD PRESSURE: 84 MMHG | HEIGHT: 62 IN

## 2025-05-07 DIAGNOSIS — Z09 POSTOPERATIVE FOLLOW-UP: Primary | ICD-10-CM

## 2025-05-07 DIAGNOSIS — N95.1 MENOPAUSAL SYMPTOMS: ICD-10-CM

## 2025-05-07 PROCEDURE — 99024 POSTOP FOLLOW-UP VISIT: CPT | Performed by: OBSTETRICS & GYNECOLOGY

## 2025-05-07 RX ORDER — ESTRADIOL 0.07 MG/D
1 PATCH TRANSDERMAL WEEKLY
Qty: 4 EACH | Refills: 2 | Status: SHIPPED | OUTPATIENT
Start: 2025-05-07

## 2025-05-07 NOTE — PROGRESS NOTES
"    Bill To MD  [facility]  [address]  [place] [address]  Office [phone]  Fax [phone]      Subjective   Subjective     Gloriapurvi Melgoza is a 44 y.o. female who presents to the clinic 6 weeks status post robot-TLH/BSO/cystoscopy.     Eating a regular diet without difficulty.   Bowel movements are normal.   Voiding without difficulty.  The patient is not having any pain.  Vaginal bleeding: scant staining today only. Had some cramping/twinge then spot when she wiped.   Hot flushes and mood swings have increased drastically. Interested in HRT. Was not interested prior.     Review of Systems  ROS notable for: meeting postoperative milestones          Objective   Objective   Visit Vitals  /84   Ht 157.5 cm (62\")   Wt 68.2 kg (150 lb 6.4 oz)   BMI 27.51 kg/m²     Physical Exam  Vitals and nursing note reviewed. Exam conducted with a chaperone present.   Constitutional:       Appearance: Normal appearance.   HENT:      Head: Normocephalic and atraumatic.   Eyes:      General: No scleral icterus.     Conjunctiva/sclera: Conjunctivae normal.   Abdominal:      General: There is no distension.      Palpations: Abdomen is soft.      Tenderness: There is no abdominal tenderness. There is no guarding or rebound.      Comments: Incisions: clean and dry, well healed and approximated, no erythema or discharge, no TTP   Genitourinary:     General: Normal vulva.      Exam position: Lithotomy position.      Pubic Area: No rash or pubic lice.       Labia:         Right: No rash, tenderness or lesion.         Left: No rash, tenderness or lesion.       Urethra: No prolapse or urethral lesion.      Vagina: Normal.      Uterus: Absent.       Adnexa: Right adnexa normal and left adnexa normal.      Rectum: No external hemorrhoid.      Comments: Vaginal cuff - well healed and approximated, sutures still present; no dehiscence, no bleeding, no abnormal discharge    Cervix: absent  Neurological:      " Mental Status: She is alert.               Result Review    Op Note by Bill To MD (03/24/2025 07:20)     Tissue Pathology Exam (03/24/2025 08:10)   1.  Uterus, cervix, bilateral tubes and ovaries, hysterectomy and bilateral salpingo-oophorectomy:  Incidental dermoid cyst of right ovary.  Chronic cervicitis.  Previous ablation of endometrium.  Diffuse adenomyosis.  Multiple myometrial leiomyomata.  Unremarkable left and right fallopian tubes and fimbrial epithelial lining.  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  Uterine weight of 196.2 g.    The 10-year ASCVD risk score (Jeffrey ROSAS, et al., 2019) is: 1%    Values used to calculate the score:      Age: 44 years      Sex: Female      Is Non- : No      Diabetic: No      Tobacco smoker: No      Systolic Blood Pressure: 126 mmHg      Is BP treated: No      HDL Cholesterol: 35 mg/dL      Total Cholesterol: 152 mg/dL    Mammo Diagnostic Digital Tomosynthesis Left With CAD (04/21/2025 14:10)   BIRADS 2         Assessment & Plan   Assessment / Plan   Doing well postoperatively.  Operative findings again reviewed.   Pathology report discussed.  Incision care reviewed.  Activity restrictions reviewed: none  Anticipated return to work: now without restrictions  HRT discussed - consider increasing effexor, HRT options discussed. after discussion, trial patch.     Diagnoses and all orders for this visit:    1. Postoperative follow-up (Primary)    2. Menopausal symptoms  -     estradiol (Climara) 0.075 MG/24HR patch; Place 1 patch on the skin as directed by provider 1 (One) Time Per Week.  Dispense: 4 each; Refill: 2         Return in about 3 months (around 8/7/2025) for GYN Follow-up.         Bill To MD  5/7/2025  14:57 CDT

## 2025-05-27 ENCOUNTER — SPECIALTY PHARMACY (OUTPATIENT)
Dept: PHARMACY | Facility: TELEHEALTH | Age: 45
End: 2025-05-27
Payer: COMMERCIAL

## 2025-05-27 NOTE — PROGRESS NOTES
Specialty Pharmacy Patient Management Program  [facility]       Gloria was contacted today regarding refills of their Nurtec medication(s).    [facility] Questionnaire Responses      Flowsheet Row Questionnaire Series Submission from 5/27/2025 in Initial Department with Evaristo, Generic Provider   Changes to allergies? No    Changes to medications? No    New conditions or infections since last clinic visit No    Unplanned office visit, urgent care, ED, or hospital admission in the last 4 weeks  No    How does patient/caregiver feel medication is working? Fair    Financial problems or insurance changes  No    Since the previous refill, were any specialty medication doses or scheduled injections missed or delayed?  No    Delivery address Home    Doses left of specialty medications 2    Copay verified? Yes    Delivery Date Selection 05/31/25             Refill Questions      Flowsheet Row Most Recent Value   Does this patient require a clinical escalation to a pharmacist? No            Delivery Questions      Flowsheet Row Most Recent Value   Delivery method UPS   Delivery address verified with patient/caregiver? Yes   Other address preferred N/A   Number of medications in delivery 1   Medication(s) being filled and delivered Rimegepant Sulfate (NURTEC-ODT)   Copay verified? Yes   Copay amount $0   Copay form of payment No copayment ($0)   Delivery Date Selection 05/30/25  [LM delivery will come 06/30]   Signature Required No                   Follow-up: 21 day(s)     Omid Kuo  5/27/2025  09:31 EDT

## 2025-06-18 ENCOUNTER — TRANSCRIBE ORDERS (OUTPATIENT)
Dept: ADMINISTRATIVE | Facility: HOSPITAL | Age: 45
End: 2025-06-18
Payer: COMMERCIAL

## 2025-06-18 ENCOUNTER — HOSPITAL ENCOUNTER (OUTPATIENT)
Dept: GENERAL RADIOLOGY | Facility: HOSPITAL | Age: 45
Discharge: HOME | End: 2025-06-18
Payer: COMMERCIAL

## 2025-06-18 ENCOUNTER — LAB (OUTPATIENT)
Dept: LAB | Facility: HOSPITAL | Age: 45
End: 2025-06-18
Payer: COMMERCIAL

## 2025-06-18 DIAGNOSIS — M46.1 SACROILIITIS: Primary | ICD-10-CM

## 2025-06-18 DIAGNOSIS — G25.81 RESTLESS LEGS: ICD-10-CM

## 2025-06-18 DIAGNOSIS — M46.1 SACROILIITIS, NOT ELSEWHERE CLASSIFIED: ICD-10-CM

## 2025-06-18 DIAGNOSIS — J32.4 CHRONIC PANSINUSITIS: ICD-10-CM

## 2025-06-18 DIAGNOSIS — N95.1 SYMPTOMATIC MENOPAUSAL OR FEMALE CLIMACTERIC STATES: ICD-10-CM

## 2025-06-18 DIAGNOSIS — R73.09 ELEVATED GLUCOSE: ICD-10-CM

## 2025-06-18 DIAGNOSIS — F51.01 PRIMARY INSOMNIA: ICD-10-CM

## 2025-06-18 LAB
25(OH)D3 SERPL-MCNC: 21.6 NG/ML (ref 30–100)
ALBUMIN SERPL-MCNC: 4.5 G/DL (ref 3.5–5.2)
ALBUMIN/GLOB SERPL: 1.6 G/DL
ALP SERPL-CCNC: 102 U/L (ref 39–117)
ALT SERPL W P-5'-P-CCNC: 21 U/L (ref 1–33)
ANION GAP SERPL CALCULATED.3IONS-SCNC: 11 MMOL/L (ref 5–15)
AST SERPL-CCNC: 18 U/L (ref 1–32)
BILIRUB SERPL-MCNC: 0.4 MG/DL (ref 0–1.2)
BUN SERPL-MCNC: 18.1 MG/DL (ref 6–20)
BUN/CREAT SERPL: 23.2 (ref 7–25)
CALCIUM SPEC-SCNC: 9.4 MG/DL (ref 8.6–10.5)
CHLORIDE SERPL-SCNC: 107 MMOL/L (ref 98–107)
CHOLEST SERPL-MCNC: 169 MG/DL (ref 0–200)
CO2 SERPL-SCNC: 20 MMOL/L (ref 22–29)
CREAT SERPL-MCNC: 0.78 MG/DL (ref 0.57–1)
DEPRECATED RDW RBC AUTO: 43.6 FL (ref 37–54)
EGFRCR SERPLBLD CKD-EPI 2021: 96.2 ML/MIN/1.73
ERYTHROCYTE [DISTWIDTH] IN BLOOD BY AUTOMATED COUNT: 13 % (ref 12.3–15.4)
GLOBULIN UR ELPH-MCNC: 2.8 GM/DL
GLUCOSE SERPL-MCNC: 98 MG/DL (ref 65–99)
HBA1C MFR BLD: 5.5 % (ref 4.8–5.6)
HCT VFR BLD AUTO: 41.9 % (ref 34–46.6)
HDLC SERPL-MCNC: 39 MG/DL (ref 40–60)
HGB BLD-MCNC: 13.9 G/DL (ref 12–15.9)
LDLC SERPL CALC-MCNC: 80 MG/DL (ref 0–100)
LDLC/HDLC SERPL: 1.77 {RATIO}
MCH RBC QN AUTO: 30.4 PG (ref 26.6–33)
MCHC RBC AUTO-ENTMCNC: 33.2 G/DL (ref 31.5–35.7)
MCV RBC AUTO: 91.7 FL (ref 79–97)
PLATELET # BLD AUTO: 234 10*3/MM3 (ref 140–450)
PMV BLD AUTO: 11.1 FL (ref 6–12)
POTASSIUM SERPL-SCNC: 3.9 MMOL/L (ref 3.5–5.2)
PROT SERPL-MCNC: 7.3 G/DL (ref 6–8.5)
RBC # BLD AUTO: 4.57 10*6/MM3 (ref 3.77–5.28)
SODIUM SERPL-SCNC: 138 MMOL/L (ref 136–145)
T3FREE SERPL-MCNC: 3.1 PG/ML (ref 2–4.4)
T4 FREE SERPL-MCNC: 1.2 NG/DL (ref 0.92–1.68)
TRIGL SERPL-MCNC: 304 MG/DL (ref 0–150)
TSH SERPL DL<=0.05 MIU/L-ACNC: 2.51 UIU/ML (ref 0.27–4.2)
VLDLC SERPL-MCNC: 50 MG/DL (ref 5–40)
WBC NRBC COR # BLD AUTO: 5.26 10*3/MM3 (ref 3.4–10.8)

## 2025-06-18 PROCEDURE — 83036 HEMOGLOBIN GLYCOSYLATED A1C: CPT | Performed by: PHYSICIAN ASSISTANT

## 2025-06-18 PROCEDURE — 80050 GENERAL HEALTH PANEL: CPT | Performed by: PHYSICIAN ASSISTANT

## 2025-06-18 PROCEDURE — 84481 FREE ASSAY (FT-3): CPT | Performed by: PHYSICIAN ASSISTANT

## 2025-06-18 PROCEDURE — 84439 ASSAY OF FREE THYROXINE: CPT | Performed by: PHYSICIAN ASSISTANT

## 2025-06-18 PROCEDURE — 80061 LIPID PANEL: CPT | Performed by: PHYSICIAN ASSISTANT

## 2025-06-18 PROCEDURE — 36415 COLL VENOUS BLD VENIPUNCTURE: CPT | Performed by: PHYSICIAN ASSISTANT

## 2025-06-18 PROCEDURE — 82306 VITAMIN D 25 HYDROXY: CPT | Performed by: PHYSICIAN ASSISTANT

## 2025-06-18 PROCEDURE — 72110 X-RAY EXAM L-2 SPINE 4/>VWS: CPT

## 2025-07-11 ENCOUNTER — PROCEDURE VISIT (OUTPATIENT)
Dept: NEUROLOGY | Facility: CLINIC | Age: 45
End: 2025-07-11
Payer: COMMERCIAL

## 2025-07-11 DIAGNOSIS — G43.719 INTRACTABLE CHRONIC MIGRAINE WITHOUT AURA AND WITHOUT STATUS MIGRAINOSUS: Primary | ICD-10-CM

## 2025-07-11 PROCEDURE — 64615 CHEMODENERV MUSC MIGRAINE: CPT | Performed by: PSYCHIATRY & NEUROLOGY

## 2025-07-11 NOTE — PROGRESS NOTES
Botox injections for Chronic Migraine           Headache log data:  See scanned data      The patient has had significant improvement in migraine intensity and frequency. Will benefit from continued injections.         Once again, a written consent was obtained from the patient to receive repeat injections of Botulinum toxin type A into muscles of the scalp, face, and neck in standardized fashion according to recommended by American Headache Society and widely-accepted FDA approve PREEMT trial protocol. The procedure is performed in compliance with clean technique.     155 Units of Botox injected using 30 G needle and 1 ml syringes into 31 sites as follows (45 units of 200 unit vial used are lost in dilution and transition, or discarded):     5 units into each  muscle and into the procerus muscle (15 units total)     5 units each x 2 sites into each frontalis muscle (20 units total)     5 units each x 4 sites into each temporalis muscle (40 units total)     5 units each x 3 sites into each occipitalis muscle (30 units total)     5 units each x 2 sites into each splenius capitis (20 units total)     5 units each x 3 sites into each trapezius muscle (30 units total)     Patient tolerated injections well, without any immediate side effects or complications. Patient was instructed to monitor for potential late side effects from Botox treatment, which were explained to the patient in details. Patient is instructed to call if any unusual feeling develops at the sites of injections, if there is an unexpected muscle weakness, ptosis, or any difficulty with breathing. Otherwise, patient will be brought back for the next treatment (per protocol) in 3 months.  
No

## 2025-07-14 ENCOUNTER — SPECIALTY PHARMACY (OUTPATIENT)
Dept: PHARMACY | Facility: TELEHEALTH | Age: 45
End: 2025-07-14
Payer: COMMERCIAL

## 2025-07-14 NOTE — PROGRESS NOTES
Specialty Pharmacy Patient Management Program  [facility]       Gloria was contacted today regarding refills of their Nurtec medication(s).    [facility] Questionnaire Responses      Flowsheet Row Questionnaire Series Submission from 5/27/2025 in Initial Department with Evaristo, Generic Provider   Changes to allergies? No    Changes to medications? No    New conditions or infections since last clinic visit No    Unplanned office visit, urgent care, ED, or hospital admission in the last 4 weeks  No    How does patient/caregiver feel medication is working? Fair    Financial problems or insurance changes  No    Since the previous refill, were any specialty medication doses or scheduled injections missed or delayed?  No    Delivery address Home    Doses left of specialty medications 2    Copay verified? Yes    Delivery Date Selection 05/31/25             Refill Questions      Flowsheet Row Most Recent Value   Changes to allergies? No   Changes to medications? No   New conditions or infections since last clinic visit No   Unplanned office visit, urgent care, ED, or hospital admission in the last 4 weeks  No   How does patient/caregiver feel medication is working? Good   Financial problems or insurance changes  No   Since the previous refill, were any specialty medication doses or scheduled injections missed or delayed?  No   Does this patient require a clinical escalation to a pharmacist? No            Delivery Questions      Flowsheet Row Most Recent Value   Delivery method [employer]   Delivery address verified with patient/caregiver? Yes   Delivery address Home   Other address preferred N/A   Number of medications in delivery 1   Medication(s) being filled and delivered Rimegepant Sulfate (NURTEC-ODT)   Doses left of specialty medications 5 Tablets   Copay verified? Yes   Copay amount $0   Copay form of payment No copayment ($0)   Delivery Date Selection 05/30/25   Signature Required No                    Follow-up: 21 day(s)     Omid Kuo  7/14/2025  09:52 EDT

## 2025-08-06 ENCOUNTER — TRANSCRIBE ORDERS (OUTPATIENT)
Dept: ADMINISTRATIVE | Facility: HOSPITAL | Age: 45
End: 2025-08-06
Payer: COMMERCIAL

## 2025-08-06 ENCOUNTER — HOSPITAL ENCOUNTER (OUTPATIENT)
Dept: GENERAL RADIOLOGY | Facility: HOSPITAL | Age: 45
Discharge: HOME | End: 2025-08-06
Admitting: FAMILY MEDICINE
Payer: COMMERCIAL

## 2025-08-06 DIAGNOSIS — S32.591D FRACTURE OF PUBIC TUBERCLE, RIGHT, WITH ROUTINE HEALING, SUBSEQUENT ENCOUNTER: Primary | ICD-10-CM

## 2025-08-06 DIAGNOSIS — S32.591D FRACTURE OF PUBIC TUBERCLE, RIGHT, WITH ROUTINE HEALING, SUBSEQUENT ENCOUNTER: ICD-10-CM

## 2025-08-06 PROCEDURE — 72170 X-RAY EXAM OF PELVIS: CPT

## 2025-08-07 ENCOUNTER — OFFICE VISIT (OUTPATIENT)
Dept: OBSTETRICS AND GYNECOLOGY | Age: 45
End: 2025-08-07
Payer: COMMERCIAL

## 2025-08-07 VITALS
WEIGHT: 151 LBS | BODY MASS INDEX: 27.79 KG/M2 | DIASTOLIC BLOOD PRESSURE: 78 MMHG | HEIGHT: 62 IN | SYSTOLIC BLOOD PRESSURE: 124 MMHG

## 2025-08-07 DIAGNOSIS — Z79.890 HORMONE REPLACEMENT THERAPY (HRT): ICD-10-CM

## 2025-08-07 DIAGNOSIS — N95.1 MENOPAUSAL SYMPTOMS: Primary | ICD-10-CM

## 2025-08-07 PROCEDURE — 99213 OFFICE O/P EST LOW 20 MIN: CPT | Performed by: OBSTETRICS & GYNECOLOGY

## 2025-08-07 RX ORDER — TESTOSTERONE CYPIONATE 1000 MG/10ML
INJECTION, SOLUTION INTRAMUSCULAR
COMMUNITY

## 2025-08-07 RX ORDER — PROGESTERONE 200 MG/1
CAPSULE ORAL EVERY 24 HOURS
COMMUNITY

## (undated) DEVICE — 4-PORT MANIFOLD: Brand: NEPTUNE 2

## (undated) DEVICE — BLADELESS OBTURATOR: Brand: WECK VISTA

## (undated) DEVICE — PATIENT TRACKER 9734887XOM NON-INVASIVE

## (undated) DEVICE — TRAP FLD MINIVAC MEGADYNE 100ML

## (undated) DEVICE — COLLECTION SOCK, GENERAL: Brand: DEROYAL

## (undated) DEVICE — KT CLN CLEANOR SCPE

## (undated) DEVICE — BLADE 1884012EM RAD12 4MM M4 ROTATE ROHS: Brand: FUSION®

## (undated) DEVICE — PATIENT RETURN ELECTRODE, SINGLE-USE, CONTACT QUALITY MONITORING, ADULT, WITH 9FT CORD, FOR PATIENTS WEIGING OVER 33LBS. (15KG): Brand: MEGADYNE

## (undated) DEVICE — GLV SURG BIOGEL M LTX PF 7 1/2

## (undated) DEVICE — ARM DRAPE

## (undated) DEVICE — PK POSTN TRENGUARD450 PROC

## (undated) DEVICE — 3M™ IOBAN™ 2 ANTIMICROBIAL INCISE DRAPE 6650EZ: Brand: IOBAN™ 2

## (undated) DEVICE — SUT SILK 2/0 SH 30IN K833H

## (undated) DEVICE — PAD,NON-ADHERENT,3X8,STERILE,LF,1/PK: Brand: MEDLINE

## (undated) DEVICE — NDL HYPO PRECISIONGLIDE/REG 18G 11/2 PNK

## (undated) DEVICE — DRSNG SURESITE WNDW 4X4.5

## (undated) DEVICE — ELECTRD BLD EZ CLN MOD XLNG 2.75IN

## (undated) DEVICE — SUT GUT CHRM 4/0 P3 18IN 1654G

## (undated) DEVICE — SYR CONTRL LUERLOK 10CC

## (undated) DEVICE — SEAL

## (undated) DEVICE — POSITIONER,HEAD,MULTIRING,36CS: Brand: MEDLINE

## (undated) DEVICE — NDL HYPO PRECISIONGLIDE REG 25G 1 1/2

## (undated) DEVICE — PAD MINOR UNIVERSAL: Brand: MEDLINE INDUSTRIES, INC.

## (undated) DEVICE — SUT MNCRYL 4/0 PS2 27IN UD MCP426H

## (undated) DEVICE — GLV SURG DERMASSURE GRN LF PF 7.0

## (undated) DEVICE — KIT,ANTI FOG,W/SPONGE & FLUID,SOFT PACK: Brand: MEDLINE

## (undated) DEVICE — MANIP UTER ELEVATOR/PRO W/LNG/HNDL CERV/CUP 35MM MD

## (undated) DEVICE — TIP COVER ACCESSORY

## (undated) DEVICE — PK ENT HD AND NK 30

## (undated) DEVICE — ANTIBACTERIAL UNDYED BRAIDED (POLYGLACTIN 910), SYNTHETIC ABSORBABLE SUTURE: Brand: COATED VICRYL

## (undated) DEVICE — SYR LL TP 10ML STRL

## (undated) DEVICE — WIPE INST 3X3IN 2MM BX/20

## (undated) DEVICE — GLV SURG SENSICARE W/ALOE PF LF 7.5 STRL

## (undated) DEVICE — DAVINCI: Brand: MEDLINE INDUSTRIES, INC.

## (undated) DEVICE — ST TBG AIRSEAL FLTR TRI LUM

## (undated) DEVICE — TROC BLADLES ANCHORPORT/OPTI LP 8X120MM 1P/U

## (undated) DEVICE — INTENDED TO AID IN THE PASSING OF SUTURES THROUGH BONE AND SOFT TISSUE DURING ORTHOPEDIC SURGERY: Brand: HOFFEE SUTURE RETRIEVER

## (undated) DEVICE — STERILE (14X122CM) TELESCOPICALLY-FOLDED COVER: Brand: CIV-CLEAR™ TRANSDUCER COVER

## (undated) DEVICE — GLV SURG SENSICARE W/ALOE PF LF 6.5 STRL

## (undated) DEVICE — SHEET,DRAPE,53X77,STERILE: Brand: MEDLINE

## (undated) DEVICE — INSTR TRACKER 9733533 EM ENT

## (undated) DEVICE — CLTH CLENS READYCLEANSE PERI CARE PK/5

## (undated) DEVICE — ADHS SKIN PREMIERPRO EXOFIN TOPICAL HI/VISC .5ML

## (undated) DEVICE — TUBING 1895522 5PK STRAIGHTSHOT TO XPS: Brand: STRAIGHTSHOT®

## (undated) DEVICE — SINU FOAM: Brand: SINU-FOAM

## (undated) DEVICE — SYR LUERLOK 30CC

## (undated) DEVICE — MICRO HVTSA, 0.5G AND HVTSA SOURCEMARK PRODUCT CODE M1206 AND M1206-01: Brand: EXOFIN MICRO HVTSA, 0.5G

## (undated) DEVICE — TUBING, SUCTION, 1/4" X 12', STRAIGHT: Brand: MEDLINE

## (undated) DEVICE — GLOVE,SURG,SENSICARE,ALOE,LF,PF,6: Brand: MEDLINE

## (undated) DEVICE — CLEANER,CAUTERY TIP,2X2",STERILE: Brand: MEDLINE

## (undated) DEVICE — COAGULATOR SXN MEGADYNE FT/CONTRL 10F 6IN

## (undated) DEVICE — SUT GUT PLN 4/0 P3 18IN 1644G

## (undated) DEVICE — SPONGE,NEURO,0.5"X3",XR,STRL,LF,10/PK: Brand: MEDLINE